# Patient Record
Sex: FEMALE | Race: WHITE | Employment: OTHER | URBAN - METROPOLITAN AREA
[De-identification: names, ages, dates, MRNs, and addresses within clinical notes are randomized per-mention and may not be internally consistent; named-entity substitution may affect disease eponyms.]

---

## 2019-10-14 ENCOUNTER — OFFICE VISIT (OUTPATIENT)
Dept: GERIATRIC MEDICINE | Age: 84
End: 2019-10-14
Payer: MEDICARE

## 2019-10-14 DIAGNOSIS — N32.81 DETRUSOR INSTABILITY: ICD-10-CM

## 2019-10-14 DIAGNOSIS — I48.11 LONGSTANDING PERSISTENT ATRIAL FIBRILLATION (HCC): Primary | ICD-10-CM

## 2019-10-14 DIAGNOSIS — H81.10 BENIGN PAROXYSMAL POSITIONAL VERTIGO, UNSPECIFIED LATERALITY: ICD-10-CM

## 2019-10-14 DIAGNOSIS — E78.5 DYSLIPIDEMIA: ICD-10-CM

## 2019-10-14 DIAGNOSIS — I10 BENIGN ESSENTIAL HTN: ICD-10-CM

## 2019-10-14 DIAGNOSIS — I38 HEART VALVE DISEASE: ICD-10-CM

## 2019-10-14 DIAGNOSIS — M15.9 PRIMARY OSTEOARTHRITIS INVOLVING MULTIPLE JOINTS: ICD-10-CM

## 2019-10-16 ENCOUNTER — OFFICE VISIT (OUTPATIENT)
Dept: GERIATRIC MEDICINE | Age: 84
End: 2019-10-16
Payer: MEDICARE

## 2019-10-16 DIAGNOSIS — I48.20 CHRONIC ATRIAL FIBRILLATION (HCC): Primary | ICD-10-CM

## 2019-10-16 DIAGNOSIS — M75.00 ADHESIVE CAPSULITIS OF SHOULDER, UNSPECIFIED LATERALITY: ICD-10-CM

## 2019-10-16 DIAGNOSIS — D63.8 ANEMIA, CHRONIC DISEASE: ICD-10-CM

## 2019-10-16 DIAGNOSIS — K59.00 CONSTIPATION, UNSPECIFIED CONSTIPATION TYPE: ICD-10-CM

## 2019-10-16 RX ORDER — PRAVASTATIN SODIUM 20 MG
20 TABLET ORAL DAILY
COMMUNITY
End: 2020-08-31

## 2019-10-16 RX ORDER — MECLIZINE HYDROCHLORIDE 25 MG/1
12.5 TABLET ORAL 3 TIMES DAILY
COMMUNITY

## 2019-10-16 RX ORDER — DILTIAZEM HYDROCHLORIDE 240 MG/1
240 CAPSULE, EXTENDED RELEASE ORAL DAILY
COMMUNITY

## 2019-10-16 RX ORDER — ALENDRONATE SODIUM 70 MG/1
70 TABLET ORAL
COMMUNITY
End: 2020-08-31

## 2019-10-16 RX ORDER — MECLIZINE HCL 12.5 MG/1
12.5 TABLET ORAL 3 TIMES DAILY PRN
COMMUNITY
End: 2020-06-10

## 2019-10-16 RX ORDER — ACETAMINOPHEN 500 MG
500 TABLET ORAL 3 TIMES DAILY
COMMUNITY

## 2019-10-16 RX ORDER — MULTIVIT-MIN/IRON/FOLIC ACID/K 18-600-40
2000 CAPSULE ORAL 2 TIMES DAILY
COMMUNITY
End: 2022-03-01

## 2019-10-16 RX ORDER — PHENOL 1.4 %
2 AEROSOL, SPRAY (ML) MUCOUS MEMBRANE DAILY
COMMUNITY
End: 2020-08-31

## 2019-10-16 RX ORDER — DOCUSATE SODIUM 100 MG/1
100 CAPSULE, LIQUID FILLED ORAL 2 TIMES DAILY
COMMUNITY

## 2019-10-16 RX ORDER — POTASSIUM CHLORIDE 750 MG/1
10 TABLET, EXTENDED RELEASE ORAL DAILY
COMMUNITY
End: 2022-03-01 | Stop reason: ALTCHOICE

## 2019-10-16 RX ORDER — OXYCODONE HYDROCHLORIDE AND ACETAMINOPHEN 5; 325 MG/1; MG/1
1 TABLET ORAL 2 TIMES DAILY PRN
COMMUNITY
End: 2020-08-31

## 2019-10-16 RX ORDER — FUROSEMIDE 20 MG/1
20 TABLET ORAL DAILY
COMMUNITY
End: 2020-08-31

## 2019-10-16 RX ORDER — HYDROCHLOROTHIAZIDE 12.5 MG/1
25 TABLET ORAL DAILY
COMMUNITY

## 2019-11-01 LAB
BASOPHILS ABSOLUTE: 0.1 /ΜL
BASOPHILS RELATIVE PERCENT: 1 %
EOSINOPHILS ABSOLUTE: 0.2 /ΜL
EOSINOPHILS RELATIVE PERCENT: 2.9 %
HCT VFR BLD CALC: 39 % (ref 36–46)
HEMOGLOBIN: 13 G/DL (ref 12–16)
INR BLD: 2
LYMPHOCYTES ABSOLUTE: 2 /ΜL
LYMPHOCYTES RELATIVE PERCENT: 25.3 %
MCH RBC QN AUTO: 31.9 PG
MCHC RBC AUTO-ENTMCNC: 33.4 G/DL
MCV RBC AUTO: 95.5 FL
MONOCYTES ABSOLUTE: 0.7 /ΜL
MONOCYTES RELATIVE PERCENT: 9.2 %
NEUTROPHILS ABSOLUTE: 4.8 /ΜL
NEUTROPHILS RELATIVE PERCENT: 61.6 %
PLATELET # BLD: 231 K/ΜL
PMV BLD AUTO: 9.2 FL
PROTIME: 21.1 SECONDS
RBC # BLD: 4.08 10^6/ΜL
WBC # BLD: 7.8 10^3/ML

## 2019-11-05 ENCOUNTER — OFFICE VISIT (OUTPATIENT)
Dept: GERIATRIC MEDICINE | Age: 84
End: 2019-11-05
Payer: MEDICARE

## 2019-11-05 DIAGNOSIS — I48.11 LONGSTANDING PERSISTENT ATRIAL FIBRILLATION (HCC): Primary | ICD-10-CM

## 2019-11-05 DIAGNOSIS — H81.10 BENIGN PAROXYSMAL POSITIONAL VERTIGO, UNSPECIFIED LATERALITY: ICD-10-CM

## 2019-11-05 DIAGNOSIS — I10 BENIGN ESSENTIAL HTN: ICD-10-CM

## 2019-11-05 DIAGNOSIS — M19.90 ARTHRITIS: ICD-10-CM

## 2019-11-12 VITALS
HEART RATE: 70 BPM | RESPIRATION RATE: 18 BRPM | SYSTOLIC BLOOD PRESSURE: 148 MMHG | TEMPERATURE: 98.5 F | DIASTOLIC BLOOD PRESSURE: 96 MMHG | OXYGEN SATURATION: 96 %

## 2019-11-17 PROBLEM — H81.10 BENIGN PAROXYSMAL POSITIONAL VERTIGO: Status: ACTIVE | Noted: 2019-11-17

## 2019-11-17 PROBLEM — I10 BENIGN ESSENTIAL HTN: Status: ACTIVE | Noted: 2019-11-17

## 2019-11-17 PROBLEM — E78.5 DYSLIPIDEMIA: Status: ACTIVE | Noted: 2019-11-17

## 2019-11-17 PROBLEM — N32.81 DETRUSOR INSTABILITY: Status: ACTIVE | Noted: 2019-11-17

## 2019-11-17 PROBLEM — I38 HEART VALVE DISEASE: Status: ACTIVE | Noted: 2019-11-17

## 2019-11-17 PROBLEM — M15.9 PRIMARY OSTEOARTHRITIS INVOLVING MULTIPLE JOINTS: Status: ACTIVE | Noted: 2019-11-17

## 2019-11-17 PROBLEM — I48.11 LONGSTANDING PERSISTENT ATRIAL FIBRILLATION (HCC): Status: ACTIVE | Noted: 2019-11-17

## 2019-12-03 ENCOUNTER — OFFICE VISIT (OUTPATIENT)
Dept: GERIATRIC MEDICINE | Age: 84
End: 2019-12-03
Payer: MEDICARE

## 2019-12-03 DIAGNOSIS — F32.0 CURRENT MILD EPISODE OF MAJOR DEPRESSIVE DISORDER, UNSPECIFIED WHETHER RECURRENT (HCC): Primary | ICD-10-CM

## 2019-12-03 DIAGNOSIS — M25.511 ACUTE PAIN OF RIGHT SHOULDER: ICD-10-CM

## 2019-12-03 DIAGNOSIS — R42 VERTIGO: ICD-10-CM

## 2019-12-03 DIAGNOSIS — R27.0 ATAXIA: ICD-10-CM

## 2019-12-05 LAB
INR BLD: 1.7
PROTIME: 18.4 SECONDS

## 2019-12-09 ENCOUNTER — OFFICE VISIT (OUTPATIENT)
Dept: PALLATIVE CARE | Age: 84
End: 2019-12-09
Payer: MEDICARE

## 2019-12-09 VITALS
DIASTOLIC BLOOD PRESSURE: 64 MMHG | HEART RATE: 65 BPM | SYSTOLIC BLOOD PRESSURE: 110 MMHG | OXYGEN SATURATION: 99 % | RESPIRATION RATE: 16 BRPM

## 2019-12-09 DIAGNOSIS — Z51.5 PALLIATIVE CARE ENCOUNTER: Primary | ICD-10-CM

## 2019-12-09 DIAGNOSIS — M19.90 OSTEOARTHRITIS, UNSPECIFIED OSTEOARTHRITIS TYPE, UNSPECIFIED SITE: ICD-10-CM

## 2019-12-09 DIAGNOSIS — G89.29 CHRONIC RIGHT SHOULDER PAIN: ICD-10-CM

## 2019-12-09 DIAGNOSIS — M25.511 CHRONIC RIGHT SHOULDER PAIN: ICD-10-CM

## 2019-12-09 DIAGNOSIS — Z71.89 OTHER SPECIFIED COUNSELING: ICD-10-CM

## 2019-12-09 DIAGNOSIS — M75.01 ADHESIVE CAPSULITIS OF RIGHT SHOULDER: ICD-10-CM

## 2019-12-09 PROBLEM — A46 ERYSIPELAS: Status: ACTIVE | Noted: 2018-11-19

## 2019-12-09 PROBLEM — I48.20 CHRONIC ATRIAL FIBRILLATION (HCC): Status: ACTIVE | Noted: 2019-12-09

## 2019-12-09 ASSESSMENT — ENCOUNTER SYMPTOMS
STRIDOR: 0
EYE DISCHARGE: 0
SORE THROAT: 0
BLOOD IN STOOL: 0
WHEEZING: 0
VOMITING: 0
COUGH: 0
CHOKING: 0
CONSTIPATION: 0
NAUSEA: 0
APNEA: 0
VOICE CHANGE: 0
DIARRHEA: 0
TROUBLE SWALLOWING: 0
ABDOMINAL DISTENTION: 0
COLOR CHANGE: 0
BACK PAIN: 0
CHEST TIGHTNESS: 0
RECTAL PAIN: 0
SHORTNESS OF BREATH: 0
ANAL BLEEDING: 0
ABDOMINAL PAIN: 0

## 2019-12-12 LAB
INR BLD: 1.6
PROTIME: 16.7 SECONDS

## 2019-12-19 PROBLEM — M19.90 ARTHRITIS: Status: ACTIVE | Noted: 2019-12-19

## 2019-12-19 LAB
INR BLD: 2.1
PROTIME: 22.1 SECONDS

## 2019-12-23 LAB
INR BLD: 2.4
PROTIME: 24.6 SECONDS

## 2019-12-24 ENCOUNTER — OFFICE VISIT (OUTPATIENT)
Dept: GERIATRIC MEDICINE | Age: 84
End: 2019-12-24
Payer: MEDICARE

## 2019-12-26 LAB
INR BLD: 2
PROTIME: 20.3 SECONDS

## 2019-12-30 LAB
INR BLD: 2.6
PROTIME: 26.7 SECONDS

## 2019-12-31 ENCOUNTER — OFFICE VISIT (OUTPATIENT)
Dept: GERIATRIC MEDICINE | Age: 84
End: 2019-12-31
Payer: MEDICARE

## 2020-01-02 LAB
INR BLD: 2.5
PROTIME: 25.3 SECONDS

## 2020-01-04 LAB
BILIRUBIN, URINE: NEGATIVE
BLOOD, URINE: NEGATIVE
CLARITY: CLEAR
COLOR: YELLOW
GLUCOSE URINE: NEGATIVE
KETONES, URINE: NEGATIVE
LEUKOCYTE ESTERASE, URINE: NEGATIVE
NITRITE, URINE: NEGATIVE
PH UA: 7 (ref 4.5–8)
PROTEIN UA: NEGATIVE
SPECIFIC GRAVITY, URINE: 1.01
UROBILINOGEN, URINE: NORMAL

## 2020-01-07 PROBLEM — R41.0 CONFUSION: Status: ACTIVE | Noted: 2020-01-07

## 2020-01-07 PROBLEM — F32.1 CURRENT MODERATE EPISODE OF MAJOR DEPRESSIVE DISORDER WITHOUT PRIOR EPISODE (HCC): Status: ACTIVE | Noted: 2020-01-07

## 2020-01-07 PROBLEM — R35.0 URINE FREQUENCY: Status: ACTIVE | Noted: 2020-01-07

## 2020-01-07 NOTE — PROGRESS NOTES
no significant edema. She has no bruising or induration noted on her arms, elbows, joint, back. Her skin is intact. ASSESSMENT AND PLAN:   1. Coughing, wheezing: She is on Levaquin. 2. Fall: She does have a history of vertigo. No ataxia at my exam.  3. Left eye blepharitis: Baby soap and water or even just warm compress to remove crust from her eyelashes in the morning. POC, meds, labs reviewed. We will continue to follow. Watch her INR closely while she is on antibiotic and we will watch for any neurological changes.     Shaina Abdi, APRN-CNP      Shaina Abdi DNP, MSN, RN, GNP-BC, NP-C  Adult/Kishore Nurse Practitioner  [facility] - [facility]  Department of Geriatrics  8:30am-4:30pm   [phone]  After hours Answering service [phone]

## 2020-01-13 LAB
INR BLD: 1.9
PROTIME: 19.9 SECONDS

## 2020-01-15 ENCOUNTER — OFFICE VISIT (OUTPATIENT)
Dept: PALLATIVE CARE | Age: 85
End: 2020-01-15
Payer: MEDICARE

## 2020-01-15 VITALS
DIASTOLIC BLOOD PRESSURE: 74 MMHG | HEART RATE: 63 BPM | OXYGEN SATURATION: 93 % | RESPIRATION RATE: 16 BRPM | SYSTOLIC BLOOD PRESSURE: 110 MMHG

## 2020-01-15 ASSESSMENT — ENCOUNTER SYMPTOMS
DIARRHEA: 0
NAUSEA: 0
CHEST TIGHTNESS: 0
VOICE CHANGE: 0
BLOOD IN STOOL: 0
CHOKING: 0
TROUBLE SWALLOWING: 0
CONSTIPATION: 0
ABDOMINAL DISTENTION: 0
COLOR CHANGE: 0
STRIDOR: 0
APNEA: 0
ANAL BLEEDING: 0
SORE THROAT: 0
VOMITING: 0
ABDOMINAL PAIN: 0
BACK PAIN: 0
WHEEZING: 0
COUGH: 0
RECTAL PAIN: 0
SHORTNESS OF BREATH: 0
EYE DISCHARGE: 0

## 2020-01-15 NOTE — PROGRESS NOTES
on phone: Not on file     Gets together: Not on file     Attends Confucianism service: Not on file     Active member of club or organization: Not on file     Attends meetings of clubs or organizations: Not on file     Relationship status: Not on file    Intimate partner violence:     Fear of current or ex partner: Not on file     Emotionally abused: Not on file     Physically abused: Not on file     Forced sexual activity: Not on file   Other Topics Concern    Not on file   Social History Narrative    Not on file     No family history on file. Allergies   Allergen Reactions    Pcn [Penicillins]     Vesicare [Solifenacin]      Current Outpatient Medications on File Prior to Visit   Medication Sig Dispense Refill    acetaminophen (TYLENOL) 500 MG tablet Take 500 mg by mouth 2 times daily      alendronate (FOSAMAX) 70 MG tablet Take 70 mg by mouth every 7 days      calcium carbonate 600 MG TABS tablet Take 2 tablets by mouth daily      diltiazem (DILACOR XR) 240 MG extended release capsule Take 240 mg by mouth daily      docusate sodium (COLACE) 100 MG capsule Take 100 mg by mouth 2 times daily      furosemide (LASIX) 20 MG tablet Take 20 mg by mouth daily      hydrochlorothiazide (HYDRODIURIL) 12.5 MG tablet Take 12.5 mg by mouth daily      meclizine (ANTIVERT) 25 MG tablet Take 50 mg by mouth 2 times daily      potassium chloride (KLOR-CON M) 10 MEQ extended release tablet Take 10 mEq by mouth daily      meclizine (ANTIVERT) 12.5 MG tablet Take 12.5 mg by mouth 3 times daily as needed      oxyCODONE-acetaminophen (PERCOCET) 5-325 MG per tablet Take 1 tablet by mouth 2 times daily as needed for Pain.       diclofenac sodium (VOLTAREN) 1 % GEL Apply 2 g topically daily as needed for Pain      pravastatin (PRAVACHOL) 20 MG tablet Take 20 mg by mouth daily      Cholecalciferol (VITAMIN D) 2000 units CAPS capsule Take 2,000 Units by mouth daily       No current facility-administered medications on file prior to visit. Review of Systems   Constitutional: Negative for activity change, appetite change, chills, diaphoresis, fatigue, fever and unexpected weight change. HENT: Negative for drooling, hearing loss, mouth sores, sore throat, trouble swallowing and voice change. Eyes: Negative for discharge and visual disturbance. Respiratory: Negative for apnea, cough, choking, chest tightness, shortness of breath, wheezing and stridor. Cardiovascular: Negative for chest pain, palpitations and leg swelling. Gastrointestinal: Negative for abdominal distention, abdominal pain, anal bleeding, blood in stool, constipation, diarrhea, nausea, rectal pain and vomiting. Genitourinary: Negative for difficulty urinating, dysuria, enuresis, frequency and hematuria. Musculoskeletal: Positive for arthralgias. Negative for back pain, gait problem, joint swelling and myalgias. Skin: Negative for color change, pallor, rash and wound. Allergic/Immunologic: Negative for food allergies and immunocompromised state. Neurological: Positive for dizziness. Negative for tremors, seizures, syncope, facial asymmetry, speech difficulty, weakness, light-headedness, numbness and headaches. Hematological: Negative for adenopathy. Does not bruise/bleed easily. Psychiatric/Behavioral: Negative for agitation, behavioral problems, confusion, decreased concentration, dysphoric mood, hallucinations, self-injury, sleep disturbance and suicidal ideas. The patient is not nervous/anxious and is not hyperactive. Objective:   /74   Pulse 63   Resp 16   SpO2 93%     Physical Exam  Constitutional:       General: She is not in acute distress. Appearance: She is well-developed. She is not diaphoretic. HENT:      Head: Normocephalic and atraumatic. Right Ear: External ear normal.      Left Ear: External ear normal.      Nose: Nose normal.      Mouth/Throat:      Pharynx: No oropharyngeal exudate.    Eyes: behavior  Pt advised to call for increasing or uncontrolled pain  Risk vs benefit assessed. Pt educated on risk of addiction.    Pt advised to take only as prescribed and not to change frequency of pain meds without consulting palliative care first.      Roxanna Tate, APRN - CNP

## 2020-02-05 ENCOUNTER — OFFICE VISIT (OUTPATIENT)
Dept: GERIATRIC MEDICINE | Age: 85
End: 2020-02-05
Payer: MEDICARE

## 2020-02-17 LAB
INR BLD: 1.7
PROTIME: 17.7 SECONDS

## 2020-02-19 ENCOUNTER — OFFICE VISIT (OUTPATIENT)
Dept: GERIATRIC MEDICINE | Age: 85
End: 2020-02-19
Payer: MEDICARE

## 2020-02-20 NOTE — PROGRESS NOTES
auscultation, even, nonlabored respirations. Abdomen is mildly obese, positive bowel sounds. No rigidity or guarding. Lower extremities no significant gross edema. ASSESSMENT AND PLAN:   1. Depression, under good control with low dose SSRI and her forgetfulness seems to improve overall. 2.  Osteoarthritis of the shoulders. Voltaren gel does effectively relieve it. The patient will let the nursing use it. 3.  Chronic dizziness, remains the same. She has had this for greater than 10 years now and she has been on the meclizine b.i.d. for a long time. It is sometimes effective, but other times it is not. Today she seems a little more dizzy than usual.  4.  Atrial fibrillation, on Coumadin and is having falls. Thus far she has not had any blows to the head and no bruise in the brain. We are going to monitor closely and consider pros and cons of Coumadin with falls. POC, meds, labs reviewed. We will continue to follow.           Electronically Signed By: LILIBETH Alvarez GNP-BC on 02/20/2020 08:31:38  ______________________________  LILIBETH Alvarez GNP-BC  /[id number]  D: 02/19/2020 16:16:25  T: 02/19/2020 22:40:47    cc: - [address] [facility]

## 2020-02-24 LAB
INR BLD: 3
PROTIME: 30.6 SECONDS

## 2020-02-25 ENCOUNTER — OFFICE VISIT (OUTPATIENT)
Dept: PALLATIVE CARE | Age: 85
End: 2020-02-25
Payer: MEDICARE

## 2020-02-25 ASSESSMENT — ENCOUNTER SYMPTOMS
DIARRHEA: 0
TROUBLE SWALLOWING: 0
CHOKING: 0
APNEA: 0
ABDOMINAL DISTENTION: 0
SORE THROAT: 0
STRIDOR: 0
CONSTIPATION: 0
ANAL BLEEDING: 0
BLOOD IN STOOL: 0
COLOR CHANGE: 0
COUGH: 0
BACK PAIN: 0
ABDOMINAL PAIN: 0
NAUSEA: 0
CHEST TIGHTNESS: 0
WHEEZING: 0
VOICE CHANGE: 0
VOMITING: 0
EYE DISCHARGE: 0
SHORTNESS OF BREATH: 0
RECTAL PAIN: 0

## 2020-02-25 NOTE — PROGRESS NOTES
phone: Not on file     Gets together: Not on file     Attends Baptist service: Not on file     Active member of club or organization: Not on file     Attends meetings of clubs or organizations: Not on file     Relationship status: Not on file    Intimate partner violence:     Fear of current or ex partner: Not on file     Emotionally abused: Not on file     Physically abused: Not on file     Forced sexual activity: Not on file   Other Topics Concern    Not on file   Social History Narrative    Not on file     No family history on file. Allergies   Allergen Reactions    Pcn [Penicillins]     Vesicare [Solifenacin]      Current Outpatient Medications on File Prior to Visit   Medication Sig Dispense Refill    acetaminophen (TYLENOL) 500 MG tablet Take 500 mg by mouth 2 times daily      alendronate (FOSAMAX) 70 MG tablet Take 70 mg by mouth every 7 days      calcium carbonate 600 MG TABS tablet Take 2 tablets by mouth daily      diltiazem (DILACOR XR) 240 MG extended release capsule Take 240 mg by mouth daily      docusate sodium (COLACE) 100 MG capsule Take 100 mg by mouth 2 times daily      furosemide (LASIX) 20 MG tablet Take 20 mg by mouth daily      hydrochlorothiazide (HYDRODIURIL) 12.5 MG tablet Take 12.5 mg by mouth daily      meclizine (ANTIVERT) 25 MG tablet Take 50 mg by mouth 2 times daily      potassium chloride (KLOR-CON M) 10 MEQ extended release tablet Take 10 mEq by mouth daily      meclizine (ANTIVERT) 12.5 MG tablet Take 12.5 mg by mouth 3 times daily as needed      oxyCODONE-acetaminophen (PERCOCET) 5-325 MG per tablet Take 1 tablet by mouth 2 times daily as needed for Pain.       diclofenac sodium (VOLTAREN) 1 % GEL Apply 2 g topically daily as needed for Pain      pravastatin (PRAVACHOL) 20 MG tablet Take 20 mg by mouth daily      Cholecalciferol (VITAMIN D) 2000 units CAPS capsule Take 2,000 Units by mouth daily       No current facility-administered medications on file prior to visit. Review of Systems   Constitutional: Positive for fatigue. Negative for activity change, appetite change, chills, diaphoresis, fever and unexpected weight change. HENT: Negative for drooling, hearing loss, mouth sores, sore throat, trouble swallowing and voice change. Eyes: Negative for discharge and visual disturbance. Respiratory: Negative for apnea, cough, choking, chest tightness, shortness of breath, wheezing and stridor. Cardiovascular: Negative for chest pain, palpitations and leg swelling. Gastrointestinal: Negative for abdominal distention, abdominal pain, anal bleeding, blood in stool, constipation, diarrhea, nausea, rectal pain and vomiting. Genitourinary: Negative for difficulty urinating, dysuria, enuresis, frequency and hematuria. Musculoskeletal: Negative for back pain, gait problem, joint swelling and myalgias. Skin: Negative for color change, pallor, rash and wound. Allergic/Immunologic: Negative for food allergies and immunocompromised state. Neurological: Positive for headaches. Negative for dizziness, tremors, seizures, syncope, facial asymmetry, speech difficulty, weakness, light-headedness and numbness. Hematological: Negative for adenopathy. Does not bruise/bleed easily. Psychiatric/Behavioral: Positive for confusion. Negative for agitation, behavioral problems, decreased concentration, dysphoric mood, hallucinations, self-injury, sleep disturbance and suicidal ideas. The patient is not nervous/anxious and is not hyperactive. Objective: There were no vitals taken for this visit. Physical Exam  Constitutional:       General: She is not in acute distress. Appearance: She is well-developed. She is not diaphoretic. HENT:      Head: Normocephalic and atraumatic. Right Ear: External ear normal.      Left Ear: External ear normal.      Nose: Nose normal.      Mouth/Throat:      Pharynx: No oropharyngeal exudate.    Eyes:

## 2020-02-27 LAB
BILIRUBIN, URINE: NEGATIVE
BLOOD, URINE: NEGATIVE
CLARITY: ABNORMAL
COLOR: YELLOW
GLUCOSE URINE: NEGATIVE
KETONES, URINE: NEGATIVE
LEUKOCYTE ESTERASE, URINE: POSITIVE
NITRITE, URINE: POSITIVE
PH UA: 6 (ref 4.5–8)
PROTEIN UA: NEGATIVE
SPECIFIC GRAVITY, URINE: 1.01
UROBILINOGEN, URINE: NORMAL

## 2020-03-02 LAB
INR BLD: 3.2
PROTIME: 32.6 SECONDS

## 2020-03-16 LAB
INR BLD: 2.3
PROTIME: 23.5 SECONDS

## 2020-03-30 LAB
INR BLD: 2
PROTIME: 20.9 SECONDS

## 2020-04-16 LAB
BILIRUBIN, URINE: NEGATIVE
BLOOD, URINE: NEGATIVE
CLARITY: ABNORMAL
COLOR: YELLOW
GLUCOSE URINE: NEGATIVE
KETONES, URINE: NEGATIVE
LEUKOCYTE ESTERASE, URINE: POSITIVE
NITRITE, URINE: NEGATIVE
PH UA: 6 (ref 4.5–8)
PROTEIN UA: NEGATIVE
SPECIFIC GRAVITY, URINE: 1.02
UROBILINOGEN, URINE: NORMAL

## 2020-04-21 ENCOUNTER — VIRTUAL VISIT (OUTPATIENT)
Dept: GERIATRIC MEDICINE | Age: 85
End: 2020-04-21
Payer: MEDICARE

## 2020-04-27 ENCOUNTER — VIRTUAL VISIT (OUTPATIENT)
Dept: PALLATIVE CARE | Age: 85
End: 2020-04-27
Payer: MEDICARE

## 2020-04-27 LAB
INR BLD: 2.5
PROTIME: 25.5 SECONDS

## 2020-04-27 PROCEDURE — 99441 PR PHYS/QHP TELEPHONE EVALUATION 5-10 MIN: CPT | Performed by: NURSE PRACTITIONER

## 2020-04-27 NOTE — PROGRESS NOTES
file   Tobacco Use    Smoking status: Not on file   Substance and Sexual Activity    Alcohol use: Not on file    Drug use: Not on file    Sexual activity: Not on file   Lifestyle    Physical activity     Days per week: Not on file     Minutes per session: Not on file    Stress: Not on file   Relationships    Social connections     Talks on phone: Not on file     Gets together: Not on file     Attends Latter-day service: Not on file     Active member of club or organization: Not on file     Attends meetings of clubs or organizations: Not on file     Relationship status: Not on file    Intimate partner violence     Fear of current or ex partner: Not on file     Emotionally abused: Not on file     Physically abused: Not on file     Forced sexual activity: Not on file   Other Topics Concern    Not on file   Social History Narrative    Not on file     No family history on file.   Allergies   Allergen Reactions    Pcn [Penicillins]     Vesicare [Solifenacin]      Current Outpatient Medications on File Prior to Visit   Medication Sig Dispense Refill    acetaminophen (TYLENOL) 500 MG tablet Take 500 mg by mouth 2 times daily      alendronate (FOSAMAX) 70 MG tablet Take 70 mg by mouth every 7 days      calcium carbonate 600 MG TABS tablet Take 2 tablets by mouth daily      diltiazem (DILACOR XR) 240 MG extended release capsule Take 240 mg by mouth daily      docusate sodium (COLACE) 100 MG capsule Take 100 mg by mouth 2 times daily      furosemide (LASIX) 20 MG tablet Take 20 mg by mouth daily      hydrochlorothiazide (HYDRODIURIL) 12.5 MG tablet Take 12.5 mg by mouth daily      meclizine (ANTIVERT) 25 MG tablet Take 50 mg by mouth 2 times daily      potassium chloride (KLOR-CON M) 10 MEQ extended release tablet Take 10 mEq by mouth daily      meclizine (ANTIVERT) 12.5 MG tablet Take 12.5 mg by mouth 3 times daily as needed      oxyCODONE-acetaminophen (PERCOCET) 5-325 MG per tablet Take 1 tablet by

## 2020-05-09 NOTE — PROGRESS NOTES
months (around 7/21/2020). Hilda Malik is a 80 y.o. female being evaluated by a Virtual Visit (video visit) encounter to address concerns as mentioned above. A caregiver was present when appropriate. Due to this being a TeleHealth encounter (During INNNT-54 public health emergency), evaluation of the following organ systems was limited: Vitals/Constitutional/EENT/Resp/CV/GI//MS/Neuro/Skin/Heme-Lymph-Imm. Pursuant to the emergency declaration under the [address] [place] [address], [address] [place] [address] authority and the [employer] Act, this Virtual Visit was conducted with patient's (and/or legal guardian's) consent, to reduce the patient's risk of exposure to COVID-19 and provide necessary medical care. The patient (and/or legal guardian) has also been advised to contact this office for worsening conditions or problems, and seek emergency medical treatment and/or call 911 if deemed necessary. Patient identification was verified at the start of the visit: Yes    Total time spent on this encounter: Not billed by time    Services were provided through a video synchronous discussion virtually to substitute for in-person clinic visit, and they agree to continue with assistance of NF staff and via VIRTUAL platform Patient and provider were located at their individual homes. --JESSENIA Godoy - CNP on 5/9/2020 at 7:09 PM    An electronic signature was used to authenticate this note.

## 2020-05-28 ENCOUNTER — OUTSIDE SERVICES (OUTPATIENT)
Dept: GERIATRIC MEDICINE | Age: 85
End: 2020-05-28
Payer: MEDICARE

## 2020-05-28 LAB
INR BLD: 2.6
PROTIME: 26.6 SECONDS

## 2020-05-28 PROCEDURE — 93793 ANTICOAG MGMT PT WARFARIN: CPT | Performed by: NURSE PRACTITIONER

## 2020-06-10 ENCOUNTER — VIRTUAL VISIT (OUTPATIENT)
Dept: PALLATIVE CARE | Age: 85
End: 2020-06-10
Payer: MEDICARE

## 2020-06-10 LAB
BILIRUBIN, URINE: NEGATIVE
BLOOD, URINE: NEGATIVE
CLARITY: ABNORMAL
COLOR: YELLOW
GLUCOSE URINE: NEGATIVE
KETONES, URINE: NEGATIVE
LEUKOCYTE ESTERASE, URINE: ABNORMAL
NITRITE, URINE: POSITIVE
PH UA: 7 (ref 4.5–8)
PROTEIN UA: NEGATIVE
SPECIFIC GRAVITY, URINE: 1.02
UROBILINOGEN, URINE: NORMAL

## 2020-06-10 RX ORDER — WARFARIN SODIUM 2.5 MG/1
6.5 TABLET ORAL
COMMUNITY
End: 2020-08-30 | Stop reason: ALTCHOICE

## 2020-06-10 ASSESSMENT — ENCOUNTER SYMPTOMS
VOMITING: 0
SHORTNESS OF BREATH: 0
DIARRHEA: 0
COUGH: 0
EYE DISCHARGE: 0
CHOKING: 0
WHEEZING: 0
ABDOMINAL PAIN: 0
BLOOD IN STOOL: 0
SORE THROAT: 0
ABDOMINAL DISTENTION: 0
BACK PAIN: 0
ANAL BLEEDING: 0
CONSTIPATION: 0
APNEA: 0
NAUSEA: 0
STRIDOR: 0
COLOR CHANGE: 0
VOICE CHANGE: 0
TROUBLE SWALLOWING: 0
RECTAL PAIN: 0
CHEST TIGHTNESS: 0

## 2020-06-10 NOTE — PROGRESS NOTES
[employer] of [address] [facility], [address] [facility]    Subjective:      Patient Id:  Aline Jackson is a 80 y.o. female who presents today with   Chief Complaint   Patient presents with    Follow-up          HPI  Seen at [facility] DR. BECCA SANTIAGO for symptom management rt OA, Afib, and depression. Demeanor calm and relaxed, NAD. She feels current regimen is helping with her OA related pain in her shoulders    Long standing complaints of dizziness which has caused falls at times. She has had multiple workups, Antivert does not help, PT did not help. She has had this complaint over 10 years. Weight stable, Appetite is good. No GI or  concerns. Negative excessive fatigue, fever, chills, myalgias, increased sputum production or cough, nausea, vomiting, chest pain, or orthopnea. Negative significant Sleep disturbance, depression, anxiety, or agitation episodes. 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 Social History     Socioeconomic History    Marital status:       Spouse name: Not on file    Number of children: Not on file    Years of education: Not on file    Highest education level: Not on file   Occupational History    Not on file   Social Needs    Financial resource strain: Not on file    Food insecurity     Worry: Not on file     Inability: Not on file    Transportation needs     Medical: Not on file     Non-medical: Not on file   Tobacco Use    Smoking status: Not on file   Substance and Sexual Activity    Alcohol use: Not on file    Drug use: Not on file    Sexual activity: Not on file   Lifestyle    Physical activity     Days per week: Not on file     Minutes per session: Not on file    Stress: Not on file   Relationships    Social connections     Talks on phone: Not on file     Gets together: Not on file     Attends Baptism service: Not on file     Active member of club or organization: Not on file     Attends meetings of clubs or organizations: Not on file     Relationship status: Not on file    Intimate partner violence     Fear of current or ex partner: Not on file     Emotionally abused: Not on file     Physically abused: Not on file     Forced sexual activity: Not on file   Other Topics Concern    Not on file   Social History Narrative    Not on file     No family history on file. Allergies   Allergen Reactions    Pcn [Penicillins]     Vesicare [Solifenacin]      Current Outpatient Medications on File Prior to Visit   Medication Sig Dispense Refill    warfarin (COUMADIN) 2.5 MG tablet Take 6.5 mg by mouth Takes Monday- wed- saturday      acetaminophen (TYLENOL) 500 MG tablet Take 500 mg by mouth 2 times daily      alendronate (FOSAMAX) 70 MG tablet Take 70 mg by mouth every 7 days      calcium carbonate 600 MG TABS tablet Take 2 tablets by mouth daily      diltiazem (DILACOR XR) 240 MG extended release capsule Take 240 mg by mouth daily      docusate sodium (COLACE) 100 MG capsule Take 100 mg by mouth 2 times daily      furosemide (LASIX) 20 MG tablet Take 20 mg by mouth daily      hydrochlorothiazide (HYDRODIURIL) 12.5 MG tablet Take 12.5 mg by mouth daily      meclizine (ANTIVERT) 25 MG tablet Take 50 mg by mouth 2 times daily      potassium chloride (KLOR-CON M) 10 MEQ extended release tablet Take 10 mEq by mouth daily      oxyCODONE-acetaminophen (PERCOCET) 5-325 MG per tablet Take 1 tablet by mouth 2 times daily as needed for Pain.       diclofenac sodium (VOLTAREN) 1 % GEL Apply 2 g topically daily as needed for Pain      pravastatin (PRAVACHOL) 20 MG tablet Take 20 mg by mouth daily      exercises  Pt is tolerating current pain meds without adverse effects or over sedation. Lowest effective dose used. Pt advised to call and notify palliative care for any adverse effects or sedation  Pt is able to maintain adequate functional level and participate in ADLs  OARRS reviewed. There is no indication of aberrant behavior  Nursing staff  advised to call for increasing or uncontrolled pain  Risk vs benefit assessed. Pt educated on risk of addiction.      Dizziness  -  Instead of Meclizine, Consider Valium 2 mg po every 12 hours prn vertigo, hold for oversedation   as it may improve both pain and vertigo    Shelli Andre, APRN - CNP

## 2020-06-19 LAB
INR BLD: 4.9
PROTIME: 48.5 SECONDS

## 2020-06-22 LAB
INR BLD: 1.4
PROTIME: 14.9 SECONDS

## 2020-06-24 ENCOUNTER — OFFICE VISIT (OUTPATIENT)
Dept: GERIATRIC MEDICINE | Age: 85
End: 2020-06-24
Payer: MEDICARE

## 2020-06-27 NOTE — PROGRESS NOTES
[facility]  [address] [place] [address]      6/24/2020    Twyla Malik  is a 80 y.o. in the NF being seen for a f/u of   Chief Complaint   Patient presents with    Altered Mental Status     hallucinations       Spoke to nursing and ancillary staff regarding patient status, present state of health. Need for nursing care and assistance. Reviewed chart, labs, medications in NF chart and allergies. Aging in place at this time. HPI lives in  assisted living. Being seen for hallucinations 2 weeks ago treated for UTI, INR stable on coumadin   Reports from nurse, pt is at baseline, no change in vision, hearing. No headaches or choking issues. Uses walker  for locomotion in facility. No decrease in appetite, no change in B/B habits. No pain crises. NO fevers. Can make needs known to nurse. 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 No family history on file. Social History     Socioeconomic History    Marital status:       Spouse name: Not on file    Number of children: Not on file    Years of education: Not on file    Highest education level: Not on file   Occupational History    Not on file   Social Needs    Financial resource strain: Not on file    Food insecurity     Worry: Not on file     Inability: Not on file    Transportation needs     Medical: Not on file     Non-medical: Not on file   Tobacco Use    Smoking status: Not on file   Substance and Sexual Activity    Alcohol use: Not on file    Drug use: Not on file    Sexual activity: Not on file   Lifestyle    Physical 9/24/2020). Pertinent POC, labs, have been reviewed, continue same. Encourage fluids and good nutrition. Stress fall prevention strategies.     Kristine Chun, APRN-CNP      Kristine Chun, DNP, MSN, RN, GNP-BC, NP-C  Adult/Kishore Nurse Practitioner  [address], [facility]  Department of Geriatrics  8:30am-4:30pm   [phone]  After hours Answering service [phone]

## 2020-06-30 VITALS
WEIGHT: 164 LBS | HEART RATE: 60 BPM | TEMPERATURE: 99.5 F | SYSTOLIC BLOOD PRESSURE: 124 MMHG | DIASTOLIC BLOOD PRESSURE: 68 MMHG | RESPIRATION RATE: 18 BRPM

## 2020-07-02 LAB
INR BLD: 1.6
PROTIME: 16.4 SECONDS

## 2020-07-16 LAB
INR BLD: 1.7
PROTIME: 17.7 SECONDS

## 2020-07-28 ENCOUNTER — OFFICE VISIT (OUTPATIENT)
Dept: PALLATIVE CARE | Age: 85
End: 2020-07-28
Payer: MEDICARE

## 2020-07-28 VITALS
DIASTOLIC BLOOD PRESSURE: 74 MMHG | RESPIRATION RATE: 16 BRPM | SYSTOLIC BLOOD PRESSURE: 110 MMHG | OXYGEN SATURATION: 98 % | HEART RATE: 60 BPM

## 2020-07-28 LAB
INR BLD: 2.1
PROTIME: 21.6 SECONDS

## 2020-07-28 ASSESSMENT — ENCOUNTER SYMPTOMS
DIARRHEA: 0
RECTAL PAIN: 0
SORE THROAT: 0
VOICE CHANGE: 0
ABDOMINAL PAIN: 0
TROUBLE SWALLOWING: 0
BACK PAIN: 1
CONSTIPATION: 0
CHEST TIGHTNESS: 0
COUGH: 0
VOMITING: 0
ABDOMINAL DISTENTION: 0
COLOR CHANGE: 0
SHORTNESS OF BREATH: 0
EYE DISCHARGE: 0
CHOKING: 0
APNEA: 0
WHEEZING: 0
NAUSEA: 0
BLOOD IN STOOL: 0
STRIDOR: 0
ANAL BLEEDING: 0

## 2020-07-28 NOTE — PROGRESS NOTES
[employer] of [address] [facility], [address] [facility]    Subjective:      Patient Id:  Diana Orta is a 80 y.o. female who presents today with   Chief Complaint   Patient presents with    Pain          HPI   Seen at [facility] DR. BECCA SANTIAGO for symptom management follow up rt multi joint pain. PMXs for Afib, valve disease, HTN, HLD, OA. She is calm and comfortable, her multi joint pain is well controlled with current POC. However she is hallucinating seeing people in her room who are not there, nursing staff has noted this over the last two weeks, thy thought it might be a vision problem as she can be redirected and she will realize nothing is there. However when I was in the room she told me see saw children on the floor watching TV and outside her window climbing a tree. Strong smell of urine in her room and she admits to having more incontinence lately. Again I told her there were no children present and she was agreeable to this. No anxiety, agitation, or sleep disturbance per nursing staff. She does get frequent UTI's. Negative cough, SOB, edema at baseline, headache, sputum production,fever,chills, or myalgias. No Gi distress, appetite and weight are stable. Past Medical History:   Diagnosis Date    Benign essential HTN 11/17/2019    Constipation 7/26/2016    Detrusor instability 11/17/2019    Dyslipidemia 11/17/2019    Heart valve disease 11/17/2019    Longstanding persistent atrial fibrillation 11/17/2019    Primary osteoarthritis involving multiple joints 11/17/2019     No past surgical history on file. Social History     Socioeconomic History    Marital status:       Spouse name: Not on file    Number of children: Not on file    Years of education: Not on file    Highest education level: Not on file   Occupational History    Not on file   Social Needs    Financial resource strain: Not on file    Food insecurity     Worry: Not on file     Inability: Not on file   [employer] Transportation needs     Medical: Not on file     Non-medical: Not on file   Tobacco Use    Smoking status: Not on file   Substance and Sexual Activity    Alcohol use: Not on file    Drug use: Not on file    Sexual activity: Not on file   Lifestyle    Physical activity     Days per week: Not on file     Minutes per session: Not on file    Stress: Not on file   Relationships    Social connections     Talks on phone: Not on file     Gets together: Not on file     Attends Yarsani service: Not on file     Active member of club or organization: Not on file     Attends meetings of clubs or organizations: Not on file     Relationship status: Not on file    Intimate partner violence     Fear of current or ex partner: Not on file     Emotionally abused: Not on file     Physically abused: Not on file     Forced sexual activity: Not on file   Other Topics Concern    Not on file   Social History Narrative    Not on file     No family history on file.   Allergies   Allergen Reactions    Pcn [Penicillins]     Vesicare [Solifenacin]      Current Outpatient Medications on File Prior to Visit   Medication Sig Dispense Refill    warfarin (COUMADIN) 2.5 MG tablet Take 6.5 mg by mouth Takes Monday- wed- saturday      acetaminophen (TYLENOL) 500 MG tablet Take 500 mg by mouth 2 times daily      alendronate (FOSAMAX) 70 MG tablet Take 70 mg by mouth every 7 days      calcium carbonate 600 MG TABS tablet Take 2 tablets by mouth daily      diltiazem (DILACOR XR) 240 MG extended release capsule Take 240 mg by mouth daily      docusate sodium (COLACE) 100 MG capsule Take 100 mg by mouth 2 times daily      furosemide (LASIX) 20 MG tablet Take 20 mg by mouth daily      hydrochlorothiazide (HYDRODIURIL) 12.5 MG tablet Take 12.5 mg by mouth daily      meclizine (ANTIVERT) 25 MG tablet Take 50 mg by mouth 2 times daily      potassium chloride (KLOR-CON M) 10 MEQ extended release tablet Take 10 mEq by mouth daily      nervous/anxious and is not hyperactive. Objective:   /74   Pulse 60   Resp 16   SpO2 98%     Physical Exam  Constitutional:       General: She is not in acute distress. Appearance: She is well-developed. She is not diaphoretic. HENT:      Head: Normocephalic and atraumatic. Right Ear: External ear normal.      Left Ear: External ear normal.      Nose: Nose normal.      Mouth/Throat:      Pharynx: No oropharyngeal exudate. Eyes:      General: No scleral icterus. Right eye: No discharge. Left eye: No discharge. Conjunctiva/sclera: Conjunctivae normal.      Pupils: Pupils are equal, round, and reactive to light. Neck:      Musculoskeletal: Normal range of motion and neck supple. Thyroid: No thyromegaly. Vascular: No JVD. Trachea: No tracheal deviation. Cardiovascular:      Rate and Rhythm: Normal rate and regular rhythm. Heart sounds: Normal heart sounds. Pulmonary:      Effort: Pulmonary effort is normal. No respiratory distress. Breath sounds: Normal breath sounds. No stridor. No wheezing or rales. Chest:      Chest wall: No tenderness. Abdominal:      General: Bowel sounds are normal. There is no distension. Palpations: Abdomen is soft. There is no mass. Tenderness: There is no abdominal tenderness. There is no guarding or rebound. Musculoskeletal: Normal range of motion. General: No tenderness or deformity. Lymphadenopathy:      Cervical: No cervical adenopathy. Skin:     General: Skin is warm and dry. Findings: No erythema or rash. Neurological:      Mental Status: She is alert. She is confused. Psychiatric:         Attention and Perception: Attention normal.         Mood and Affect: Mood normal.         Speech: Speech normal.         Behavior: Behavior is cooperative. Thought Content: Thought content normal.         Cognition and Memory: Cognition is impaired.            Assessment: Diagnosis Orders   1. Edema, unspecified type     2. Palliative care encounter     3. Arthralgia, unspecified joint     4. Hallucinations, visual  Urinalysis with Reflex Culture   5. Frequency of urination  Urinalysis with Reflex Culture          Plan:   Pain RT OA  - Continue current POC of Voltaren gel, acetaminophen and Percocet 5/325 mg po every 12  Hours prn moderate to severe pain  - Heat or ice to painful areas, whichever is preferred  - Gentle ROM exercises  Pt is tolerating current pain meds without adverse effects or over sedation. Lowest effective dose used. Pt advised to call and notify palliative care for any adverse effects or sedation  Pt is able to maintain adequate functional level and participate in ADLs  OARRS reviewed. There is no indication of aberrant behavior  Pt advised to call for increasing or uncontrolled pain  Risk vs benefit assessed. Pt educated on risk of addiction.    Pt advised to take only as prescribed and not to change frequency of pain meds without consulting palliative care first.      Visual Hallucinations, Urinary urgency ad frequency  - UA and C&S  - Call for worsening symptoms  - IF UA wnl consider further workup    JESSENIA Ruth - CNP

## 2020-07-29 ENCOUNTER — OFFICE VISIT (OUTPATIENT)
Dept: GERIATRIC MEDICINE | Age: 85
End: 2020-07-29
Payer: MEDICARE

## 2020-07-31 LAB
BILIRUBIN, URINE: NEGATIVE
BLOOD, URINE: NEGATIVE
CLARITY: ABNORMAL
COLOR: YELLOW
GLUCOSE URINE: ABNORMAL
KETONES, URINE: NEGATIVE
LEUKOCYTE ESTERASE, URINE: ABNORMAL
NITRITE, URINE: NEGATIVE
PH UA: 5 (ref 4.5–8)
PROTEIN UA: NEGATIVE
SPECIFIC GRAVITY, URINE: 1.02
UROBILINOGEN, URINE: NORMAL

## 2020-08-08 VITALS
RESPIRATION RATE: 16 BRPM | OXYGEN SATURATION: 97 % | SYSTOLIC BLOOD PRESSURE: 135 MMHG | HEART RATE: 67 BPM | TEMPERATURE: 97.7 F | DIASTOLIC BLOOD PRESSURE: 71 MMHG

## 2020-08-08 NOTE — PROGRESS NOTES
[facility]  [address] [place] [address]      7/29/2020    Carley Malik  is a 80 y.o. in the NF being seen for a f/u of   Chief Complaint   Patient presents with    Dizziness     long standing    Urinary Tract Infection     possible       Spoke to nursing and ancillary staff regarding patient status, present state of health. Need for nursing care and assistance. Reviewed chart, labs, medications in NF chart and allergies. Aging in place at this time. HPI lives in  assisted living. Being seen for chronic illness of dizziness, nursing thinks she is more confused may have another UTI. Reports from nurse, pt is not at baseline dementia, no change in vision, hearing. No headaches or choking issues. Uses walker  for locomotion in facility. No decrease in appetite, no change in B/B habits. No pain crises. NO fevers. Can make needs known to nurse. Urge incontinence    Past Medical History:   Diagnosis Date    Benign essential HTN 11/17/2019    Constipation 7/26/2016    Detrusor instability 11/17/2019    Dyslipidemia 11/17/2019    Heart valve disease 11/17/2019    Longstanding persistent atrial fibrillation 11/17/2019    Primary osteoarthritis involving multiple joints 11/17/2019     No past surgical history on file. No family history on file. Social History     Socioeconomic History    Marital status:       Spouse name: Not on file    Number of children: Not on file    Years of education: Not on file    Highest education level: Not on file   Occupational History    Not on file   Social Needs    Financial resource strain: Not on file    Food insecurity     Worry: Not on file     Inability: Not on file    Transportation needs     Medical: Not on file     Non-medical: Not on file   Tobacco Use    Smoking status: Not on file   Substance and Sexual Activity    Alcohol use: Not on file    Drug use: Not on file    Sexual activity: Not on file Lifestyle    Physical activity     Days per week: Not on file     Minutes per session: Not on file    Stress: Not on file   Relationships    Social connections     Talks on phone: Not on file     Gets together: Not on file     Attends Scientologist service: Not on file     Active member of club or organization: Not on file     Attends meetings of clubs or organizations: Not on file     Relationship status: Not on file    Intimate partner violence     Fear of current or ex partner: Not on file     Emotionally abused: Not on file     Physically abused: Not on file     Forced sexual activity: Not on file   Other Topics Concern    Not on file   Social History Narrative    Not on file       Allergies: Pcn [penicillins] and Vesicare [solifenacin]  NF MEDICATIONS REVIEWED    ROS:  See HPI  Constitutional: There are no reports of behavioral issues, change in appetite, fever, or weakness. No gross bleeding issues. Or difficulty ambulating to meals or activities. Respiratory: denies SOB, dyspnea,   Cardiovascular: denies CP, lightheadedness,   GI: No reports of change of bowel habits, no N/V/D/C. : no reports of change in bladder habits, or wosre incontinence or urgency  Extremities: No reports of uncontrolled pain issues, no  chronic edema    Physical exam:   /71   Pulse 67   Temp 97.7 °F (36.5 °C)   Resp 16   SpO2 97%   Constitutional: Awake and alert sitting up in chair,   HEENT: normocephalic, PEAR, MMM, no cyanosis. NO neck mass visualized , facial asymmetry at baseline . Cardiovascular: Regular rate  Respiratory: LCTA, unlabored respirations  GI: abdomen ND  : no bladder tenderness  Extremities: no edema,  Psych: pleasant and able to follow simple commands, normal thought content, speech clear, no confusion noted, ++forgetful    ASSESSMENT:     Diagnosis Orders   1. Dizziness     2. Chronic UTI         PLAN:   1. antivert routinely, she won't ask for it  2.  No symptoms of UTI, U/A is evaluated frequently. Return if symptoms worsen or fail to improve. Pertinent POC, labs, have been reviewed, continue same. Encourage fluids and good nutrition. Stress fall prevention strategies.     JESSENIA Yang-CNP      Joanne Pillai DNP, MSN, RN, GNP-BC, NP-C  Adult/Kishore Nurse Practitioner  [address], [place]  Department of Geriatrics  8:30am-4:30pm   [phone]  After hours Answering service [phone]

## 2020-08-10 ASSESSMENT — PATIENT HEALTH QUESTIONNAIRE - PHQ9
SUM OF ALL RESPONSES TO PHQ9 QUESTIONS 1 & 2: 0
SUM OF ALL RESPONSES TO PHQ QUESTIONS 1-9: 0
SUM OF ALL RESPONSES TO PHQ QUESTIONS 1-9: 0
1. LITTLE INTEREST OR PLEASURE IN DOING THINGS: 0
2. FEELING DOWN, DEPRESSED OR HOPELESS: 0

## 2020-08-18 ENCOUNTER — APPOINTMENT (OUTPATIENT)
Dept: CT IMAGING | Age: 85
End: 2020-08-18
Payer: MEDICARE

## 2020-08-18 ENCOUNTER — HOSPITAL ENCOUNTER (EMERGENCY)
Age: 85
Discharge: HOME | End: 2020-08-18
Attending: EMERGENCY MEDICINE
Payer: MEDICARE

## 2020-08-18 ENCOUNTER — APPOINTMENT (OUTPATIENT)
Dept: GENERAL RADIOLOGY | Age: 85
End: 2020-08-18
Payer: MEDICARE

## 2020-08-18 VITALS
HEIGHT: 65 IN | TEMPERATURE: 98.4 F | BODY MASS INDEX: 27.49 KG/M2 | RESPIRATION RATE: 18 BRPM | OXYGEN SATURATION: 100 % | DIASTOLIC BLOOD PRESSURE: 83 MMHG | WEIGHT: 165 LBS | SYSTOLIC BLOOD PRESSURE: 165 MMHG | HEART RATE: 69 BPM

## 2020-08-18 LAB
ALBUMIN SERPL-MCNC: 3.7 G/DL (ref 3.5–4.6)
ALP BLD-CCNC: 39 U/L (ref 40–130)
ALT SERPL-CCNC: 56 U/L (ref 0–33)
ANION GAP SERPL CALCULATED.3IONS-SCNC: 8 MEQ/L (ref 9–15)
AST SERPL-CCNC: 69 U/L (ref 0–35)
BACTERIA: ABNORMAL /HPF
BASOPHILS ABSOLUTE: 0.1 K/UL (ref 0–0.2)
BASOPHILS RELATIVE PERCENT: 0.8 %
BILIRUB SERPL-MCNC: 0.8 MG/DL (ref 0.2–0.7)
BILIRUBIN URINE: NEGATIVE
BLOOD, URINE: NEGATIVE
BUN BLDV-MCNC: 22 MG/DL (ref 8–23)
CALCIUM SERPL-MCNC: 9 MG/DL (ref 8.5–9.9)
CHLORIDE BLD-SCNC: 104 MEQ/L (ref 95–107)
CLARITY: ABNORMAL
CO2: 29 MEQ/L (ref 20–31)
COLOR: YELLOW
CREAT SERPL-MCNC: 0.49 MG/DL (ref 0.5–0.9)
EKG ATRIAL RATE: 68 BPM
EKG Q-T INTERVAL: 414 MS
EKG QRS DURATION: 94 MS
EKG QTC CALCULATION (BAZETT): 447 MS
EKG R AXIS: 21 DEGREES
EKG T AXIS: 59 DEGREES
EKG VENTRICULAR RATE: 70 BPM
EOSINOPHILS ABSOLUTE: 0.2 K/UL (ref 0–0.7)
EOSINOPHILS RELATIVE PERCENT: 2.4 %
EPITHELIAL CELLS, UA: ABNORMAL /HPF (ref 0–5)
GFR AFRICAN AMERICAN: >60
GFR NON-AFRICAN AMERICAN: >60
GLOBULIN: 3.2 G/DL (ref 2.3–3.5)
GLUCOSE BLD-MCNC: 99 MG/DL (ref 70–99)
GLUCOSE URINE: NEGATIVE MG/DL
HCT VFR BLD CALC: 40.4 % (ref 37–47)
HEMOGLOBIN: 13.6 G/DL (ref 12–16)
HYALINE CASTS: ABNORMAL /HPF (ref 0–5)
KETONES, URINE: NEGATIVE MG/DL
LEUKOCYTE ESTERASE, URINE: ABNORMAL
LYMPHOCYTES ABSOLUTE: 1.4 K/UL (ref 1–4.8)
LYMPHOCYTES RELATIVE PERCENT: 20.1 %
MCH RBC QN AUTO: 31.8 PG (ref 27–31.3)
MCHC RBC AUTO-ENTMCNC: 33.7 % (ref 33–37)
MCV RBC AUTO: 94.5 FL (ref 82–100)
MONOCYTES ABSOLUTE: 0.7 K/UL (ref 0.2–0.8)
MONOCYTES RELATIVE PERCENT: 10.6 %
NEUTROPHILS ABSOLUTE: 4.6 K/UL (ref 1.4–6.5)
NEUTROPHILS RELATIVE PERCENT: 66.1 %
NITRITE, URINE: NEGATIVE
PDW BLD-RTO: 15 % (ref 11.5–14.5)
PH UA: 6 (ref 5–9)
PLATELET # BLD: 208 K/UL (ref 130–400)
POTASSIUM SERPL-SCNC: 4.9 MEQ/L (ref 3.4–4.9)
PROTEIN UA: NEGATIVE MG/DL
RBC # BLD: 4.28 M/UL (ref 4.2–5.4)
RBC UA: ABNORMAL /HPF (ref 0–5)
SODIUM BLD-SCNC: 141 MEQ/L (ref 135–144)
SPECIFIC GRAVITY UA: 1.02 (ref 1–1.03)
TOTAL PROTEIN: 6.9 G/DL (ref 6.3–8)
TROPONIN: <0.01 NG/ML (ref 0–0.01)
URINE REFLEX TO CULTURE: ABNORMAL
UROBILINOGEN, URINE: 0.2 E.U./DL
WBC # BLD: 7 K/UL (ref 4.8–10.8)
WBC UA: ABNORMAL /HPF (ref 0–5)

## 2020-08-18 PROCEDURE — 6370000000 HC RX 637 (ALT 250 FOR IP): Performed by: EMERGENCY MEDICINE

## 2020-08-18 PROCEDURE — 85025 COMPLETE CBC W/AUTO DIFF WBC: CPT

## 2020-08-18 PROCEDURE — 2580000003 HC RX 258: Performed by: EMERGENCY MEDICINE

## 2020-08-18 PROCEDURE — 84484 ASSAY OF TROPONIN QUANT: CPT

## 2020-08-18 PROCEDURE — 70450 CT HEAD/BRAIN W/O DYE: CPT

## 2020-08-18 PROCEDURE — 93005 ELECTROCARDIOGRAM TRACING: CPT | Performed by: EMERGENCY MEDICINE

## 2020-08-18 PROCEDURE — 80053 COMPREHEN METABOLIC PANEL: CPT

## 2020-08-18 PROCEDURE — 99285 EMERGENCY DEPT VISIT HI MDM: CPT

## 2020-08-18 PROCEDURE — 71045 X-RAY EXAM CHEST 1 VIEW: CPT

## 2020-08-18 PROCEDURE — 36415 COLL VENOUS BLD VENIPUNCTURE: CPT

## 2020-08-18 PROCEDURE — 81001 URINALYSIS AUTO W/SCOPE: CPT

## 2020-08-18 RX ORDER — NITROFURANTOIN 25; 75 MG/1; MG/1
100 CAPSULE ORAL ONCE
Status: COMPLETED | OUTPATIENT
Start: 2020-08-18 | End: 2020-08-18

## 2020-08-18 RX ORDER — SODIUM CHLORIDE 0.9 % (FLUSH) 0.9 %
3 SYRINGE (ML) INJECTION EVERY 8 HOURS
Status: DISCONTINUED | OUTPATIENT
Start: 2020-08-18 | End: 2020-08-18

## 2020-08-18 RX ORDER — NITROFURANTOIN 25; 75 MG/1; MG/1
100 CAPSULE ORAL 2 TIMES DAILY
Qty: 10 CAPSULE | Refills: 0 | Status: SHIPPED | OUTPATIENT
Start: 2020-08-18 | End: 2020-08-23

## 2020-08-18 RX ORDER — 0.9 % SODIUM CHLORIDE 0.9 %
250 INTRAVENOUS SOLUTION INTRAVENOUS ONCE
Status: COMPLETED | OUTPATIENT
Start: 2020-08-18 | End: 2020-08-18

## 2020-08-18 RX ADMIN — NITROFURANTOIN (MONOHYDRATE/MACROCRYSTALS) 100 MG: 75; 25 CAPSULE ORAL at 12:39

## 2020-08-18 RX ADMIN — SODIUM CHLORIDE 250 ML: 9 INJECTION, SOLUTION INTRAVENOUS at 11:25

## 2020-08-18 NOTE — ED TRIAGE NOTES
Patient arrived from [facility] assisted living with complaint of confusion this morning and not knowing who she is. Patient is A&OX2 upon arrival. msp intact. No distress noted. Dr. Jayne Kapoor at bedside. Patient asleep, but awakes to verbal stimuli. Patient has no complaints.

## 2020-08-18 NOTE — ED NOTES
Bed: 16  Expected date:   Expected time:   Means of arrival:   Comments:  94 confusion      Analilia Espinal RN  08/18/20 [id number]

## 2020-08-18 NOTE — ED NOTES
Pt down to CT with Rollbare Cecil, Tech. Pt is in no distress.      Michoacano Diaz, MARCOS  08/18/20 0262 Sixto Mckeon RN  08/18/20 [id number]

## 2020-08-18 NOTE — ED PROVIDER NOTES
[address] [facility] ED  eMERGENCY dEPARTMENT eNCOUnter      Pt Name: Zay May  MRN: [id number]  [place] 4/2/1926  Date of evaluation: 8/18/2020  Provider: Thelma eBal MD    CHIEF COMPLAINT       Chief Complaint   Patient presents with    Altered Mental Status     normal mental status is A&OX1-2. per ems patient is A&OX1. HISTORY OF PRESENT ILLNESS   (Location/Symptom, Timing/Onset,Context/Setting, Quality, Duration, Modifying Factors, Severity)  Note limiting factors. Robert Malik is a 80 y.o. female who presents to the emergency department is DNR CC, lives at a nursing home and voices no complaints. She had an episode at the nursing home this morning where she seemed confused more than usual although she has baseline confusion. She thought she was in the hospital.  She seemed weak to the caregivers. She was sent in for evaluation. She denies being on blood thinners but her med list shows otherwise. She is not diabetic. There is no fall or trauma. There is no recent cough or cold symptoms, fever was not documented at the nursing home. I spoke directly to the nursing home staff    HPI    NursingNotes were reviewed. REVIEW OF SYSTEMS    (2-9 systems for level 4, 10 or more for level 5)     Review of Systems     Constitutional symptoms:  no Fatigue, no fever, no chills. Skin symptoms:  Negative except as documented in HPI. ENMT symptoms:  Negative except as documented in HPI. Respiratory symptoms:  Negative except as documented in HPI. Cardiovascular symptoms:  Negative except as documented in HPI. Gastrointestinal symptoms: Negative except for documented as above in the HPI   Genitourinary symptoms:  Negative except as documented in HPI. Musculoskeletal symptoms:  Negative except as documented in HPI. Neurologic symptoms:  Negative except as documented in HPI.   As above baseline confusion has been present for years, seemingly greater confusion this morning  Remainder of 10 systems, all negative except for mentioned above    Except as noted above the remainder of the review of systems was reviewed and negative. PAST MEDICAL HISTORY     Past Medical History:   Diagnosis Date    Benign essential HTN 11/17/2019    Constipation 7/26/2016    Detrusor instability 11/17/2019    Dyslipidemia 11/17/2019    Heart valve disease 11/17/2019    Longstanding persistent atrial fibrillation 11/17/2019    Primary osteoarthritis involving multiple joints 11/17/2019         SURGICALHISTORY     History reviewed. No pertinent surgical history. CURRENT MEDICATIONS       Previous Medications    ACETAMINOPHEN (TYLENOL) 500 MG TABLET    Take 500 mg by mouth 2 times daily    ALENDRONATE (FOSAMAX) 70 MG TABLET    Take 70 mg by mouth every 7 days    CALCIUM CARBONATE 600 MG TABS TABLET    Take 2 tablets by mouth daily    CHOLECALCIFEROL (VITAMIN D) 2000 UNITS CAPS CAPSULE    Take 2,000 Units by mouth daily    DICLOFENAC SODIUM (VOLTAREN) 1 % GEL    Apply 2 g topically daily as needed for Pain    DILTIAZEM (DILACOR XR) 240 MG EXTENDED RELEASE CAPSULE    Take 240 mg by mouth daily    DOCUSATE SODIUM (COLACE) 100 MG CAPSULE    Take 100 mg by mouth 2 times daily    FUROSEMIDE (LASIX) 20 MG TABLET    Take 20 mg by mouth daily    HYDROCHLOROTHIAZIDE (HYDRODIURIL) 12.5 MG TABLET    Take 12.5 mg by mouth daily    MECLIZINE (ANTIVERT) 25 MG TABLET    Take 50 mg by mouth 2 times daily    OXYCODONE-ACETAMINOPHEN (PERCOCET) 5-325 MG PER TABLET    Take 1 tablet by mouth 2 times daily as needed for Pain. POTASSIUM CHLORIDE (KLOR-CON M) 10 MEQ EXTENDED RELEASE TABLET    Take 10 mEq by mouth daily    PRAVASTATIN (PRAVACHOL) 20 MG TABLET    Take 20 mg by mouth daily    WARFARIN (COUMADIN) 2.5 MG TABLET    Take 6.5 mg by mouth Takes Monday- wed- saturday       ALLERGIES     Pcn [penicillins] and Vesicare [solifenacin]    FAMILY HISTORY     History reviewed.  No pertinent family history. SOCIAL HISTORY       Social History     Socioeconomic History    Marital status:      Spouse name: None    Number of children: None    Years of education: None    Highest education level: None   Occupational History    None   Social Needs    Financial resource strain: None    Food insecurity     Worry: None     Inability: None    Transportation needs     Medical: None     Non-medical: None   Tobacco Use    Smoking status: None   Substance and Sexual Activity    Alcohol use: None    Drug use: None    Sexual activity: None   Lifestyle    Physical activity     Days per week: None     Minutes per session: None    Stress: None   Relationships    Social connections     Talks on phone: None     Gets together: None     Attends Pentecostal service: None     Active member of club or organization: None     Attends meetings of clubs or organizations: None     Relationship status: None    Intimate partner violence     Fear of current or ex partner: None     Emotionally abused: None     Physically abused: None     Forced sexual activity: None   Other Topics Concern    None   Social History Narrative    None       SCREENINGS   NIH Stroke Scale  Interval: Baseline  Level of Consciousness (1a. ): Not alert, but arousable by minor stimulation to obey  LOC Questions (1b. ):  Answers one correctly  LOC Commands (1c. ): Performs both tasks correctly  Best Gaze (2. ): Normal  Visual (3. ): No visual loss  Facial Palsy (4. ): Normal symmetrical movement  Motor Arm, Left (5a. ): No drift  Motor Arm, Right (5b. ): No drift  Motor Leg, Left (6a. ): No drift  Motor Leg, Right (6b. ): No drift  Limb Ataxia (7. ): Absent  Sensory (8. ): Normal  Best Language (9. ): No aphasia  Dysarthria (10. ): NormalGlasgow Coma Scale  Eye Opening: Spontaneous  Best Verbal Response: Oriented  Best Motor Response: Obeys commands  Mindi Coma Scale Score: 15 @FLOW([id number])@      PHYSICAL EXAM    (up to 7 for level 4, 8 or and preliminarily interpreted by the emergency physician with the below findings:  CT the brain shows atrophy and no acute changes chest x-ray is clear there is a very mild urinary tract infection which we will treat. She has been observed here in the emergency department for an hour and there is no change in mental status which appears to be baseline. I did speak with her nurse at the nursing home. Primary doctor follow-up in a couple of days. Interpretation per the Radiologist below, if available at the time ofthis note:    CT Head WO Contrast   Final Result      No acute intracranial process. XR CHEST PORTABLE    (Results Pending)         ED BEDSIDE ULTRASOUND:   Performed by ED Physician - none    LABS:  Labs Reviewed   COMPREHENSIVE METABOLIC PANEL - Abnormal; Notable for the following components:       Result Value    Anion Gap 8 (*)     CREATININE 0.49 (*)     Total Bilirubin 0.8 (*)     Alkaline Phosphatase 39 (*)     ALT 56 (*)     AST 69 (*)     All other components within normal limits   CBC WITH AUTO DIFFERENTIAL - Abnormal; Notable for the following components:    MCH 31.8 (*)     RDW 15.0 (*)     All other components within normal limits   URINE RT REFLEX TO CULTURE - Abnormal; Notable for the following components:    Clarity, UA CLOUDY (*)     Leukocyte Esterase, Urine TRACE (*)     All other components within normal limits   MICROSCOPIC URINALYSIS - Abnormal; Notable for the following components:    Bacteria, UA MANY (*)     RBC, UA 3-5 (*)     All other components within normal limits   TROPONIN       All other labs were within normal range or not returned as of this dictation.     EMERGENCY DEPARTMENT COURSE and DIFFERENTIAL DIAGNOSIS/MDM:   Vitals:    Vitals:    08/18/20 1039 08/18/20 1201 08/18/20 1234   BP: (!) 164/89 (!) 158/87 (!) 165/83   Pulse: 77 66 69   Resp: 18 18 18   Temp: 98.4 °F (36.9 °C)     TempSrc: Oral     SpO2: 100%     Weight: 165 lb (74.8 kg)     Height: 5' 5\" (1.651 m)         Chest  x-ray fails to demonstrate acute effusion or infiltrate. There is no pneumothorax. MDM    CRITICAL CARE TIME   Total Critical Care time was  minutes, excluding separately reportableprocedures. There was a high probability of clinicallysignificant/life threatening deterioration in the patient's condition which required my urgent intervention. CONSULTS:  None    PROCEDURES:  Unless otherwise noted below, none     Procedures    FINAL IMPRESSION      1.  Acute cystitis without hematuria          DISPOSITION/PLAN   DISPOSITION Decision To Discharge 08/18/2020 12:34:24 PM      PATIENT REFERRED TO:  MD Cherrie Don [phone]    In 2 days  Return for weakness, numbness, urine problems      DISCHARGE MEDICATIONS:  New Prescriptions    NITROFURANTOIN, MACROCRYSTAL-MONOHYDRATE, (MACROBID) 100 MG CAPSULE    Take 1 capsule by mouth 2 times daily for 5 days          (Please note that portions of this note were completed with a voice recognition program.  Efforts were made to edit the dictations but occasionally words are mis-transcribed.)    Araceli Eaton MD (electronically signed)  Attending Emergency Physician          Araceli Eaton MD  08/18/20 0280

## 2020-08-19 ENCOUNTER — OFFICE VISIT (OUTPATIENT)
Dept: GERIATRIC MEDICINE | Age: 85
End: 2020-08-19
Payer: MEDICARE

## 2020-08-20 PROCEDURE — 93010 ELECTROCARDIOGRAM REPORT: CPT | Performed by: INTERNAL MEDICINE

## 2020-08-29 VITALS
SYSTOLIC BLOOD PRESSURE: 124 MMHG | TEMPERATURE: 97.1 F | BODY MASS INDEX: 27.62 KG/M2 | HEART RATE: 73 BPM | RESPIRATION RATE: 16 BRPM | WEIGHT: 166 LBS | DIASTOLIC BLOOD PRESSURE: 75 MMHG

## 2020-08-30 PROBLEM — G30.1 LATE ONSET ALZHEIMER'S DISEASE WITHOUT BEHAVIORAL DISTURBANCE (HCC): Status: ACTIVE | Noted: 2020-08-30

## 2020-08-30 PROBLEM — R35.0 URINE FREQUENCY: Status: RESOLVED | Noted: 2020-01-07 | Resolved: 2020-08-30

## 2020-08-30 PROBLEM — F02.80 LATE ONSET ALZHEIMER'S DISEASE WITHOUT BEHAVIORAL DISTURBANCE (HCC): Status: ACTIVE | Noted: 2020-08-30

## 2020-08-30 PROBLEM — I48.11 LONGSTANDING PERSISTENT ATRIAL FIBRILLATION (HCC): Status: RESOLVED | Noted: 2019-11-17 | Resolved: 2020-08-30

## 2020-08-30 RX ORDER — ASPIRIN/CALCIUM/MAG/ALUMINUM 325 MG
1 TABLET ORAL DAILY
Qty: 90 TABLET | Refills: 3
Start: 2020-08-30 | End: 2020-08-31

## 2020-08-30 ASSESSMENT — PATIENT HEALTH QUESTIONNAIRE - PHQ9
2. FEELING DOWN, DEPRESSED OR HOPELESS: 0
SUM OF ALL RESPONSES TO PHQ9 QUESTIONS 1 & 2: 0
SUM OF ALL RESPONSES TO PHQ QUESTIONS 1-9: 0
1. LITTLE INTEREST OR PLEASURE IN DOING THINGS: 0
SUM OF ALL RESPONSES TO PHQ QUESTIONS 1-9: 0

## 2020-08-30 NOTE — PROGRESS NOTES
[facility]  [address] [place] [address]      8/19/2020    Moraima Malik  is a 80 y.o. in the  being seen for a f/u of   Chief Complaint   Patient presents with    Atrial Fibrillation     keeps falling! Spoke to nursing and ancillary staff regarding patient status, present state of health. Need for nursing care and assistance. Reviewed chart, labs, medications in NF chart and allergies. Aging in place at this time. HPI lives in  assisted living. Being seen for chronic illnesses. Reports from nurse, pt is confused on and off, sometimes the urine is positive for infection, but not all confusion episodes. She is cognitively impaired at times, more confused, forgetful, usually cooperative, can be re directed most times. No change in vision, hearing. No headaches or choking issues. Uses walker for locomotion in facility. No decrease in appetite, no change in B/B habits, has urine freq and detrusor instability incontinence of urine. No pain crises. NO fevers. Can make needs known to nurse. Continues to lose balance and fall , has chronic vertigo for many years. Past Medical History:   Diagnosis Date    Benign essential HTN 11/17/2019    Constipation 7/26/2016    Detrusor instability 11/17/2019    Dyslipidemia 11/17/2019    Heart valve disease 11/17/2019    Longstanding persistent atrial fibrillation 11/17/2019    Primary osteoarthritis involving multiple joints 11/17/2019     No past surgical history on file. No family history on file. Social History     Socioeconomic History    Marital status:       Spouse name: Not on file    Number of children: Not on file    Years of education: Not on file    Highest education level: Not on file   Occupational History    Not on file   Social Needs    Financial resource strain: Not on file    Food insecurity     Worry: Not on file     Inability: Not on file   [employer] needs Medical: Not on file     Non-medical: Not on file   Tobacco Use    Smoking status: Not on file   Substance and Sexual Activity    Alcohol use: Not on file    Drug use: Not on file    Sexual activity: Not on file   Lifestyle    Physical activity     Days per week: Not on file     Minutes per session: Not on file    Stress: Not on file   Relationships    Social connections     Talks on phone: Not on file     Gets together: Not on file     Attends Scientologist service: Not on file     Active member of club or organization: Not on file     Attends meetings of clubs or organizations: Not on file     Relationship status: Not on file    Intimate partner violence     Fear of current or ex partner: Not on file     Emotionally abused: Not on file     Physically abused: Not on file     Forced sexual activity: Not on file   Other Topics Concern    Not on file   Social History Narrative    Not on file       Allergies: Pcn [penicillins] and Vesicare [solifenacin]  NF MEDICATIONS REVIEWED    ROS:  See HPI  Constitutional: There are reports of confused behavioral issues, no change in appetite, fever, or weakness. No gross bleeding issues. Or difficulty ambulating to meals or activities. freq falls. Respiratory: denies SOB, dyspnea,   Cardiovascular: denies CP, lightheadedness, ++ dizzy  GI: No reports of change of bowel habits, no N/V/D/C. : no reports of change in bladder habits, incontinent urine  Extremities: No reports of uncontrolled pain issues, no  chronic edema    Physical exam:   /75   Pulse 73   Temp 97.1 °F (36.2 °C)   Resp 16   Wt 166 lb (75.3 kg)   BMI 27.62 kg/m²   Constitutional: Awake and alert sitting up in chair,   HEENT: normocephalic, PEAR, MMM, no cyanosis. NO neck mass visualized , facial asymmetry at baseline left droop.    Cardiovascular: Regular rate  Respiratory: LCTA, unlabored respirations  GI: abdomen ND  : no bladder tenderness  Extremities: tr ankle edema,  Psych: pleasant and able to follow simple commands, forgetful. speech clear    ASSESSMENT:     Diagnosis Orders   1. Chronic atrial fibrillation     2. Detrusor instability     3. Primary osteoarthritis involving multiple joints     4. Late onset Alzheimer's disease without behavioral disturbance ([facility] [address]         PLAN:   1. STOP coumadin due to freq falls she is now at high risk for brain bleed and lower risk for stroke. Start ASA 325mg po daily with food. 2. chronic UTIs  3. No pain issues  4. Spoke with POA, will start Aricept 5mg daily for one month and eval for any adverse effects. Agrees to stopping coumadin. Return in about 2 months (around 10/19/2020). Pertinent POC, labs, have been reviewed, continue same. Encourage fluids and good nutrition. Stress fall prevention strategies.     Kaushal Heath, APRN-CNP      Kaushal Heath, DNP, MSN, RN, GNP-BC, NP-C  Adult/Kishore Nurse Practitioner  [address]  Department of Geriatrics  8:30am-4:30pm   [phone]  After hours Answering service [phone]

## 2020-08-31 ENCOUNTER — OFFICE VISIT (OUTPATIENT)
Dept: PALLATIVE CARE | Age: 85
End: 2020-08-31
Payer: MEDICARE

## 2020-08-31 VITALS
DIASTOLIC BLOOD PRESSURE: 70 MMHG | RESPIRATION RATE: 18 BRPM | HEART RATE: 65 BPM | SYSTOLIC BLOOD PRESSURE: 132 MMHG | OXYGEN SATURATION: 97 %

## 2020-08-31 RX ORDER — WARFARIN SODIUM 6 MG/1
7 TABLET ORAL DAILY
COMMUNITY
End: 2020-08-31

## 2020-08-31 RX ORDER — PROPRANOLOL HYDROCHLORIDE 20 MG/1
20 TABLET ORAL NIGHTLY
COMMUNITY

## 2020-08-31 RX ORDER — ESCITALOPRAM OXALATE 10 MG/1
10 TABLET ORAL DAILY
COMMUNITY
End: 2021-08-24

## 2020-08-31 RX ORDER — DONEPEZIL HYDROCHLORIDE 5 MG/1
5 TABLET, FILM COATED ORAL NIGHTLY
COMMUNITY
End: 2022-01-09

## 2020-08-31 NOTE — PROGRESS NOTES
[employer] of [address] [facility], [address] [facility]    Subjective:      Patient Id:  Josefina Bush is a 80 y.o. female who presents today with   Chief Complaint   Patient presents with    Pain          HPI       Seen at [facility] DR. EBCCA SANTIAGO for sympom management rt advanced dementia and chronic rajesh and shoulder pain due to OA. She has had increased complaints of dizziness and was having visual hallucinations at my last visit. Many of her medications were discharged tosee if polypharmacy was an issue. She also had meclizine made routine as she will not ask for PRN medication any longer. Past Medical History:   Diagnosis Date    Benign essential HTN 11/17/2019    Constipation 7/26/2016    Detrusor instability 11/17/2019    Dyslipidemia 11/17/2019    Heart valve disease 11/17/2019    Longstanding persistent atrial fibrillation 11/17/2019    Primary osteoarthritis involving multiple joints 11/17/2019     No past surgical history on file. Social History     Socioeconomic History    Marital status:       Spouse name: Not on file    Number of children: Not on file    Years of education: Not on file    Highest education level: Not on file   Occupational History    Not on file   Social Needs    Financial resource strain: Not on file    Food insecurity     Worry: Not on file     Inability: Not on file    Transportation needs     Medical: Not on file     Non-medical: Not on file   Tobacco Use    Smoking status: Not on file   Substance and Sexual Activity    Alcohol use: Not on file    Drug use: Not on file    Sexual activity: Not on file   Lifestyle    Physical activity     Days per week: Not on file     Minutes per session: Not on file    Stress: Not on file   Relationships    Social connections     Talks on phone: Not on file     Gets together: Not on file     Attends Voodoo service: Not on file     Active member of club or organization: Not on file     Attends meetings of clubs or organizations: Not on file     Relationship status: Not on file    Intimate partner violence     Fear of current or ex partner: Not on file     Emotionally abused: Not on file     Physically abused: Not on file     Forced sexual activity: Not on file   Other Topics Concern    Not on file   Social History Narrative    Not on file     No family history on file. Allergies   Allergen Reactions    Pcn [Penicillins]     Vesicare [Solifenacin]      Current Outpatient Medications on File Prior to Visit   Medication Sig Dispense Refill    propranolol (INDERAL) 20 MG tablet Take 20 mg by mouth nightly      donepezil (ARICEPT) 5 MG tablet Take 5 mg by mouth nightly      escitalopram (LEXAPRO) 10 MG tablet Take 10 mg by mouth daily      acetaminophen (TYLENOL) 500 MG tablet Take 500 mg by mouth 2 times daily      diltiazem (DILACOR XR) 240 MG extended release capsule Take 240 mg by mouth daily      docusate sodium (COLACE) 100 MG capsule Take 100 mg by mouth 2 times daily      hydrochlorothiazide (HYDRODIURIL) 12.5 MG tablet Take 12.5 mg by mouth daily      meclizine (ANTIVERT) 25 MG tablet Take 25 mg by mouth 2 times daily       potassium chloride (KLOR-CON M) 10 MEQ extended release tablet Take 10 mEq by mouth daily      Cholecalciferol (VITAMIN D) 2000 units CAPS capsule Take 2,000 Units by mouth daily       No current facility-administered medications on file prior to visit. Review of Systems      Objective:   /70   Pulse 65   Resp 18   SpO2 97%     Physical Exam      Assessment:       Diagnosis Orders   1. Palliative care encounter  diclofenac sodium (VOLTAREN) 1 % GEL   2. Osteoarthritis, unspecified osteoarthritis type, unspecified site  diclofenac sodium (VOLTAREN) 1 % GEL   3. Chronic pain of both shoulders  diclofenac sodium (VOLTAREN) 1 % GEL   4. Chronic thoracic back pain, unspecified back pain laterality  diclofenac sodium (VOLTAREN) 1 % GEL   5.  Dementia with behavioral disturbance, unspecified dementia type ([facility]            Plan:      No orders of the defined types were placed in this encounter. Orders Placed This Encounter   Medications    diclofenac sodium (VOLTAREN) 1 % GEL     Sig: Apply 2 g topically 4 times daily To affected shoulders     Dispense:  240 g     Refill:  5       No follow-ups on file.     Michelle Willis, APRN - CNP

## 2020-10-14 ENCOUNTER — OFFICE VISIT (OUTPATIENT)
Dept: PALLATIVE CARE | Age: 85
End: 2020-10-14
Payer: MEDICARE

## 2020-10-14 VITALS
SYSTOLIC BLOOD PRESSURE: 110 MMHG | DIASTOLIC BLOOD PRESSURE: 68 MMHG | RESPIRATION RATE: 18 BRPM | OXYGEN SATURATION: 97 % | HEART RATE: 67 BPM | BODY MASS INDEX: 25.79 KG/M2 | WEIGHT: 155 LBS

## 2020-10-14 RX ORDER — ASPIRIN 325 MG
325 TABLET ORAL DAILY
COMMUNITY

## 2020-10-14 ASSESSMENT — ENCOUNTER SYMPTOMS
STRIDOR: 0
SORE THROAT: 0
COUGH: 0
TROUBLE SWALLOWING: 0
ANAL BLEEDING: 0
DIARRHEA: 0
EYE DISCHARGE: 0
VOMITING: 0
ABDOMINAL PAIN: 0
WHEEZING: 0
RECTAL PAIN: 0
APNEA: 0
COLOR CHANGE: 0
SHORTNESS OF BREATH: 0
BACK PAIN: 1
BLOOD IN STOOL: 0
CHEST TIGHTNESS: 0
ABDOMINAL DISTENTION: 0
VOICE CHANGE: 0
CONSTIPATION: 0
CHOKING: 0
NAUSEA: 0

## 2020-10-14 NOTE — PROGRESS NOTES
[employer] of [address] [facility], [address] [facility]    Subjective:      Patient Id:  Roxane Win is a 80 y.o. female who presents today with   Chief Complaint   Patient presents with    Pain          HPI   Seen at [facility] DR[facility] BECCA SANTIAGO for symptom management follow up rt chronic pain due to severe OA, PMXs for HTN, HLD, dementia, vertigo, A fib demeanor calm and relaxed, NAD, no sedation. She has lost 10 pounds in the last six months, nursing staff feel her appetite declined during an acute illness in August, but her appetite and weight seem to be stabilizing. No dysphagia, dysgeusia, or mouth sores; weight stable, Appetite is good. No GI or  concerns. No nik blood in stool or urine. Chronic pain at baseline, nursing staff are using acetaminophen and diclofenac gel routine, currently PAIN AD Zero. Auditory and visual hallucinations continue per nursing staff, but she is redirectable, not frightened, no significant agitation or anxiety. She has had multiple workups and it is felt this is a progression of dementia. SOB and Edema are at baseline. Past Medical History:   Diagnosis Date    Benign essential HTN 11/17/2019    Constipation 7/26/2016    Detrusor instability 11/17/2019    Dyslipidemia 11/17/2019    Heart valve disease 11/17/2019    Longstanding persistent atrial fibrillation 11/17/2019    Primary osteoarthritis involving multiple joints 11/17/2019     No past surgical history on file. Social History     Socioeconomic History    Marital status:       Spouse name: Not on file    Number of children: Not on file    Years of education: Not on file    Highest education level: Not on file   Occupational History    Not on file   Social Needs    Financial resource strain: Not on file    Food insecurity     Worry: Not on file     Inability: Not on file    Transportation needs     Medical: Not on file     Non-medical: Not on file   Tobacco Use    Smoking status: Not on file   Substance and Sexual Activity    Alcohol use: Not on file    Drug use: Not on file    Sexual activity: Not on file   Lifestyle    Physical activity     Days per week: Not on file     Minutes per session: Not on file    Stress: Not on file   Relationships    Social connections     Talks on phone: Not on file     Gets together: Not on file     Attends Denominational service: Not on file     Active member of club or organization: Not on file     Attends meetings of clubs or organizations: Not on file     Relationship status: Not on file    Intimate partner violence     Fear of current or ex partner: Not on file     Emotionally abused: Not on file     Physically abused: Not on file     Forced sexual activity: Not on file   Other Topics Concern    Not on file   Social History Narrative    Not on file     No family history on file.   Allergies   Allergen Reactions    Pcn [Penicillins]     Vesicare [Solifenacin]      Current Outpatient Medications on File Prior to Visit   Medication Sig Dispense Refill    aspirin 325 MG tablet Take 325 mg by mouth daily      propranolol (INDERAL) 20 MG tablet Take 20 mg by mouth nightly      donepezil (ARICEPT) 5 MG tablet Take 5 mg by mouth nightly      escitalopram (LEXAPRO) 10 MG tablet Take 10 mg by mouth daily      acetaminophen (TYLENOL) 500 MG tablet Take 500 mg by mouth 2 times daily      diltiazem (DILACOR XR) 240 MG extended release capsule Take 240 mg by mouth daily      docusate sodium (COLACE) 100 MG capsule Take 100 mg by mouth 2 times daily      hydrochlorothiazide (HYDRODIURIL) 12.5 MG tablet Take 12.5 mg by mouth daily      meclizine (ANTIVERT) 25 MG tablet Take 25 mg by mouth 2 times daily       potassium chloride (KLOR-CON M) 10 MEQ extended release tablet Take 10 mEq by mouth daily      diclofenac sodium (VOLTAREN) 1 % GEL Apply 2 g topically 4 times daily To affected shoulders 240 g 5    Cholecalciferol (VITAMIN D) 2000 units CAPS capsule Take 2,000 Units by mouth daily       No current facility-administered medications on file prior to visit. Review of Systems   Constitutional: Negative for activity change, appetite change, chills, diaphoresis, fatigue, fever and unexpected weight change. HENT: Negative for drooling, hearing loss, mouth sores, sore throat, trouble swallowing and voice change. Eyes: Negative for discharge and visual disturbance. Respiratory: Negative for apnea, cough, choking, chest tightness, shortness of breath, wheezing and stridor. Cardiovascular: Negative for chest pain, palpitations and leg swelling. Gastrointestinal: Negative for abdominal distention, abdominal pain, anal bleeding, blood in stool, constipation, diarrhea, nausea, rectal pain and vomiting. Genitourinary: Negative for difficulty urinating, dysuria, enuresis, frequency and hematuria. Musculoskeletal: Positive for arthralgias, back pain and gait problem. Negative for joint swelling and myalgias. Skin: Negative for color change, pallor, rash and wound. Allergic/Immunologic: Negative for food allergies and immunocompromised state. Neurological: Negative for dizziness, tremors, seizures, syncope, facial asymmetry, speech difficulty, weakness, light-headedness, numbness and headaches. Hematological: Negative for adenopathy. Does not bruise/bleed easily. Psychiatric/Behavioral: Positive for hallucinations. Negative for agitation, behavioral problems, confusion, decreased concentration, dysphoric mood, self-injury, sleep disturbance and suicidal ideas. The patient is not nervous/anxious and is not hyperactive. Objective:   /68   Pulse 67   Resp 18   Wt 155 lb (70.3 kg)   SpO2 97%   BMI 25.79 kg/m²     Physical Exam  Constitutional:       General: She is not in acute distress. Appearance: She is well-developed. She is not diaphoretic. HENT:      Head: Normocephalic and atraumatic.       Right Ear: tolerated  - Continue Acetaminophen 500 mg po every 12 hours, keep daily dose under 3000 mg  - Continue Diclofenac Gel qid to painful areas      Hallucinations rt neurocognitive disease   - Monitor for signs and symptoms of delirium as due to advanced age, history of cognitive difficulty's, and multiple co morbidities patient is high risk. - Offer continuous behavior redirection and orientation, avoid delirium inducing medication, expose to natural sunlight as much as possible, calm surroundings as much as possible.     Unintentional weight loss  - Small frequent meals  - Maximize calories and protein  - Consider nutritional supplements  - Will hold off on appetite stimulant for now as she seems to be improving    Ashley Lau, APRN - CNP

## 2020-10-21 ENCOUNTER — OFFICE VISIT (OUTPATIENT)
Dept: GERIATRIC MEDICINE | Age: 85
End: 2020-10-21
Payer: MEDICARE

## 2020-10-22 LAB
BASOPHILS ABSOLUTE: ABNORMAL
BASOPHILS RELATIVE PERCENT: 0.6 %
BUN BLDV-MCNC: 21 MG/DL
CALCIUM SERPL-MCNC: 8.9 MG/DL
CHLORIDE BLD-SCNC: 103 MMOL/L
CO2: 31 MMOL/L
CREAT SERPL-MCNC: 0.8 MG/DL
EOSINOPHILS ABSOLUTE: 0.2 /ΜL
EOSINOPHILS RELATIVE PERCENT: ABNORMAL
GFR CALCULATED: NORMAL
GLUCOSE BLD-MCNC: 87 MG/DL
HCT VFR BLD CALC: 35.3 % (ref 36–46)
HEMOGLOBIN: 11.6 G/DL (ref 12–16)
LYMPHOCYTES ABSOLUTE: 2.5 /ΜL
LYMPHOCYTES RELATIVE PERCENT: 33.4 %
MCH RBC QN AUTO: ABNORMAL PG
MCHC RBC AUTO-ENTMCNC: ABNORMAL G/DL
MCV RBC AUTO: ABNORMAL FL
MONOCYTES ABSOLUTE: 0.7 /ΜL
MONOCYTES RELATIVE PERCENT: 9 %
NEUTROPHILS ABSOLUTE: 4 /ΜL
NEUTROPHILS RELATIVE PERCENT: 53.9 %
PLATELET # BLD: 217 K/ΜL
PMV BLD AUTO: ABNORMAL FL
POTASSIUM SERPL-SCNC: 3.5 MMOL/L
RBC # BLD: ABNORMAL 10*6/UL
SODIUM BLD-SCNC: 142 MMOL/L
WBC # BLD: 7.3 10^3/ML

## 2020-10-24 LAB
BILIRUBIN, URINE: NEGATIVE
BLOOD, URINE: NEGATIVE
CLARITY: ABNORMAL
COLOR: YELLOW
GLUCOSE URINE: NEGATIVE
KETONES, URINE: NEGATIVE
LEUKOCYTE ESTERASE, URINE: NEGATIVE
NITRITE, URINE: NEGATIVE
PH UA: 6 (ref 4.5–8)
PROTEIN UA: NEGATIVE
SPECIFIC GRAVITY, URINE: 1.01
UROBILINOGEN, URINE: NORMAL

## 2020-10-26 LAB
FERRITIN: 149 NG/ML (ref 9–150)
IRON SATURATION: 27
IRON: 68
TOTAL IRON BINDING CAPACITY: 254

## 2020-11-04 VITALS
SYSTOLIC BLOOD PRESSURE: 130 MMHG | DIASTOLIC BLOOD PRESSURE: 82 MMHG | WEIGHT: 157 LBS | TEMPERATURE: 96.8 F | BODY MASS INDEX: 26.13 KG/M2 | HEART RATE: 82 BPM | RESPIRATION RATE: 18 BRPM

## 2020-11-09 NOTE — PROGRESS NOTES
[facility]  [address] [place] [address]      10/21/2020    Shamar Malik  is a 80 y.o. in the NF being seen for a f/u of   Chief Complaint   Patient presents with    Hallucinations     2 days ago       Spoke to nursing and ancillary staff regarding patient status, present state of health. Need for nursing care and assistance. Reviewed chart, labs, medications in NF chart and allergies. Aging in place at this time. HPI lives in  assisted living. Being seen for hallucinations 2 days ago was seeing people in her room. She has hx of this also has UTI once in a while that worsen hallucinations. She denied abdominal tenderness, fever. Does c/o urine frequency. No UTI noted. Reports from nurse, pt is at baseline, no futher hallucinations,  no change in vision, hearing. No headaches or choking issues. Uses walker for locomotion in facility. No decrease in appetite, no change in B/B habits. No pain crises. NO fevers. Can make needs known to nurse. Past Medical History:   Diagnosis Date    Benign essential HTN 11/17/2019    Constipation 7/26/2016    Detrusor instability 11/17/2019    Dyslipidemia 11/17/2019    Heart valve disease 11/17/2019    Longstanding persistent atrial fibrillation 11/17/2019    Primary osteoarthritis involving multiple joints 11/17/2019     No past surgical history on file. No family history on file. Social History     Socioeconomic History    Marital status:       Spouse name: Not on file    Number of children: Not on file    Years of education: Not on file    Highest education level: Not on file   Occupational History    Not on file   Social Needs    Financial resource strain: Not on file    Food insecurity     Worry: Not on file     Inability: Not on file    Transportation needs     Medical: Not on file     Non-medical: Not on file   Tobacco Use    Smoking status: Not on file   Substance and Sexual Activity    Alcohol use: Not on file    Drug use: Not on file    Sexual activity: Not on file   Lifestyle    Physical activity     Days per week: Not on file     Minutes per session: Not on file    Stress: Not on file   Relationships    Social connections     Talks on phone: Not on file     Gets together: Not on file     Attends Yazidism service: Not on file     Active member of club or organization: Not on file     Attends meetings of clubs or organizations: Not on file     Relationship status: Not on file    Intimate partner violence     Fear of current or ex partner: Not on file     Emotionally abused: Not on file     Physically abused: Not on file     Forced sexual activity: Not on file   Other Topics Concern    Not on file   Social History Narrative    Not on file       Allergies: Pcn [penicillins] and Vesicare [solifenacin]  NF MEDICATIONS REVIEWED    ROS:  See HPI  Constitutional: There are no reports of behavioral issues, change in appetite, fever, or weakness. No gross bleeding issues. Some difficulty ambulating to meals or activities. Respiratory: denies SOB, dyspnea,   Cardiovascular: denies CP, lightheadedness,   GI: No reports of change of bowel habits, no N/V/D/C. : no reports of change in bladder habits  Extremities: No reports of uncontrolled pain issues, no  chronic edema    Physical exam:   /82   Pulse 82   Temp 96.8 °F (36 °C)   Resp 18   Wt 157 lb (71.2 kg)   BMI 26.13 kg/m²   Constitutional: Awake and alert sitting up in chair,   HEENT: normocephalic, PEAR, MMM, no cyanosis. NO neck mass visualized , facial asymmetry note from [place]. Cardiovascular: Regular rate  Respiratory: LCTA, unlabored respirations  GI: abdomen ND  : no bladder tenderness  Extremities: no edema,  Psych: pleasant and able to follow simple commands, normal thought content, speech clear    ASSESSMENT:     Diagnosis Orders   1. Arthritis pain     2. Hallucination, visual         PLAN:   1.  Tylenol ordered routine by Palliative , also increased Votaren gel to qid  2. No UTI,  most likely dementia     Return in about 3 months (around 1/21/2021). Pertinent POC, labs, have been reviewed, continue same. Encourage fluids and good nutrition. Stress fall prevention strategies.     Christina Sahu, APRN-CNP      Christina Sahu DNP, MSN, RN, GNP-BC, NP-C  Adult/Kishore Nurse Practitioner  [facility] [address] [facility]  Department of Geriatrics  8:30am-4:30pm   [phone]  After hours Answering service [phone]

## 2020-12-01 ENCOUNTER — OFFICE VISIT (OUTPATIENT)
Dept: PALLATIVE CARE | Age: 85
End: 2020-12-01
Payer: MEDICARE

## 2020-12-01 ASSESSMENT — ENCOUNTER SYMPTOMS
ANAL BLEEDING: 0
TROUBLE SWALLOWING: 0
ABDOMINAL PAIN: 0
COUGH: 0
ABDOMINAL DISTENTION: 0
SORE THROAT: 0
BLOOD IN STOOL: 0
STRIDOR: 0
CONSTIPATION: 0
APNEA: 0
WHEEZING: 0
RECTAL PAIN: 0
BACK PAIN: 0
EYE DISCHARGE: 0
CHEST TIGHTNESS: 0
SHORTNESS OF BREATH: 0
NAUSEA: 0
CHOKING: 0
VOMITING: 0
DIARRHEA: 0
VOICE CHANGE: 0
COLOR CHANGE: 0

## 2020-12-01 NOTE — PROGRESS NOTES
[employer] of [address] [facility], [address] [facility]    Subjective:      Patient Id:  Sherren Portal is a 80 y.o. female who presents today with   Chief Complaint   Patient presents with    Follow-up          HPI       TELEHEALTH EVALUATION -- Audio/Visual (During RAZRI-05 public health emergency)     Due to COVID 19 outbreak, patient's office visit was converted to a virtual visit. Patient was contacted and agreed to proceed with a virtual visit via Doxy. me  The risks and benefits of converting to a virtual visit were discussed in light of the current infectious disease epidemic. Patient also understood that insurance coverage and co-pays are up to their individual insurance plans. Symptom management follow up rt Dementia and joint pain, nurse present for visit. Demeanor calm and relaxed, NAD, no sedation. She tells me she has had, \" a good day\". Her mentation is at baseline, confused nut directable and can make her needs know to staff. Multi joint pain is well controlled with current POC, she is at baseline with ADLS and function. Weight stable, Appetite is good. No GI or  concerns. No nik blood in stool or urine. Negative SOB or edema,excessive fatigue, fever, chills, myalgias, increased sputum production or cough, nausea, vomiting, chest pain, or orthopnea. Negative significant Sleep disturbance, depression, anxiety, or agitation episodes    Past Medical History:   Diagnosis Date    Benign essential HTN 11/17/2019    Constipation 7/26/2016    Detrusor instability 11/17/2019    Dyslipidemia 11/17/2019    Heart valve disease 11/17/2019    Longstanding persistent atrial fibrillation 11/17/2019    Primary osteoarthritis involving multiple joints 11/17/2019     No past surgical history on file. Social History     Socioeconomic History    Marital status:       Spouse name: Not on file    Number of children: Not on file    Years of education: Not on file    Highest education level: Not on file   Occupational History    Not on file   Social Needs    Financial resource strain: Not on file    Food insecurity     Worry: Not on file     Inability: Not on file    Transportation needs     Medical: Not on file     Non-medical: Not on file   Tobacco Use    Smoking status: Not on file   Substance and Sexual Activity    Alcohol use: Not on file    Drug use: Not on file    Sexual activity: Not on file   Lifestyle    Physical activity     Days per week: Not on file     Minutes per session: Not on file    Stress: Not on file   Relationships    Social connections     Talks on phone: Not on file     Gets together: Not on file     Attends Episcopal service: Not on file     Active member of club or organization: Not on file     Attends meetings of clubs or organizations: Not on file     Relationship status: Not on file    Intimate partner violence     Fear of current or ex partner: Not on file     Emotionally abused: Not on file     Physically abused: Not on file     Forced sexual activity: Not on file   Other Topics Concern    Not on file   Social History Narrative    Not on file     No family history on file.   Allergies   Allergen Reactions    Pcn [Penicillins]     Vesicare [Solifenacin]      Current Outpatient Medications on File Prior to Visit   Medication Sig Dispense Refill    aspirin 325 MG tablet Take 325 mg by mouth daily      propranolol (INDERAL) 20 MG tablet Take 20 mg by mouth nightly      donepezil (ARICEPT) 5 MG tablet Take 5 mg by mouth nightly      escitalopram (LEXAPRO) 10 MG tablet Take 10 mg by mouth daily      diclofenac sodium (VOLTAREN) 1 % GEL Apply 2 g topically 4 times daily To affected shoulders 240 g 5    acetaminophen (TYLENOL) 500 MG tablet Take 500 mg by mouth 2 times daily      diltiazem (DILACOR XR) 240 MG extended release capsule Take 240 mg by mouth daily      docusate sodium (COLACE) 100 MG capsule Take 100 mg by mouth 2 times daily      hydrochlorothiazide (HYDRODIURIL) 12.5 MG tablet Take 12.5 mg by mouth daily      meclizine (ANTIVERT) 25 MG tablet Take 25 mg by mouth 2 times daily       potassium chloride (KLOR-CON M) 10 MEQ extended release tablet Take 10 mEq by mouth daily      Cholecalciferol (VITAMIN D) 2000 units CAPS capsule Take 2,000 Units by mouth daily       No current facility-administered medications on file prior to visit. Review of Systems   Constitutional: Negative for activity change, appetite change, chills, diaphoresis, fatigue, fever and unexpected weight change. HENT: Negative for drooling, hearing loss, mouth sores, sore throat, trouble swallowing and voice change. Eyes: Negative for discharge and visual disturbance. Respiratory: Negative for apnea, cough, choking, chest tightness, shortness of breath, wheezing and stridor. Cardiovascular: Negative for chest pain, palpitations and leg swelling. Gastrointestinal: Negative for abdominal distention, abdominal pain, anal bleeding, blood in stool, constipation, diarrhea, nausea, rectal pain and vomiting. Genitourinary: Negative for difficulty urinating, dysuria, enuresis, frequency and hematuria. Musculoskeletal: Positive for arthralgias. Negative for back pain, gait problem, joint swelling and myalgias. Skin: Negative for color change, pallor, rash and wound. Allergic/Immunologic: Negative for food allergies and immunocompromised state. Neurological: Negative for dizziness, tremors, seizures, syncope, facial asymmetry, speech difficulty, weakness, light-headedness, numbness and headaches. Hematological: Negative for adenopathy. Does not bruise/bleed easily. Psychiatric/Behavioral: Positive for confusion. Negative for agitation, behavioral problems, decreased concentration, dysphoric mood, hallucinations, self-injury, sleep disturbance and suicidal ideas. The patient is not nervous/anxious and is not hyperactive. Objective: There were no vitals taken for this visit. Physical Exam  Constitutional:       Appearance: Normal appearance. HENT:      Head: Normocephalic and atraumatic. Right Ear: External ear normal.      Left Ear: External ear normal.      Nose: Nose normal.      Mouth/Throat:      Lips: Pink. Mouth: Mucous membranes are moist.   Eyes:      General: Lids are normal.   Neck:      Musculoskeletal: Full passive range of motion without pain. Pulmonary:      Effort: Pulmonary effort is normal.   Neurological:      Mental Status: She is alert. Mental status is at baseline. Psychiatric:         Mood and Affect: Mood normal.         Speech: Speech normal.         Behavior: Behavior is cooperative. Cognition and Memory: Cognition is impaired. Memory is impaired. Assessment:       Diagnosis Orders   1. Palliative care encounter     2. Arthralgia, unspecified joint     3. Osteoarthritis, unspecified osteoarthritis type, unspecified site     4. Anxiety     5. Dementia with behavioral disturbance, unspecified dementia type (HCC)            Plan:   Pain rt OA  - Contine Acetaminophen  - Continue Diclofenac gel  - Heat or ice to painful areas, whichever is preferred  - Gentle ROM exercises  Pt is able to maintain adequate functional level and participate in ADLs  Nursing advised to call for increasing or uncontrolled pain    Dementia/ Anxiety/ Agitation  Monitor for signs and symptoms of delirium as due to advanced age, history of cognitive difficulty, acute illness, and multiple comorbidities patient is high risk. Offer continuous behavior redirection and orientation, avoid delirium inducing medication, expose to natural sunlight as much as possible, calm surroundings as much as possible.       Alvin Soto, APRN - CNP

## 2021-01-13 ENCOUNTER — OFFICE VISIT (OUTPATIENT)
Dept: GERIATRIC MEDICINE | Age: 86
End: 2021-01-13
Payer: MEDICARE

## 2021-01-13 DIAGNOSIS — N32.81 DETRUSOR INSTABILITY: ICD-10-CM

## 2021-01-13 DIAGNOSIS — F02.80 LATE ONSET ALZHEIMER'S DISEASE WITHOUT BEHAVIORAL DISTURBANCE (HCC): Primary | ICD-10-CM

## 2021-01-13 DIAGNOSIS — G30.1 LATE ONSET ALZHEIMER'S DISEASE WITHOUT BEHAVIORAL DISTURBANCE (HCC): Primary | ICD-10-CM

## 2021-01-14 LAB
A/G RATIO: 1.3
ALBUMIN SERPL-MCNC: 3.2 G/DL
ALP BLD-CCNC: 37 U/L
ALT SERPL-CCNC: 8 U/L
AST SERPL-CCNC: 12 U/L
BASOPHILS ABSOLUTE: ABNORMAL
BASOPHILS RELATIVE PERCENT: 0.6 %
BILIRUB SERPL-MCNC: 0.4 MG/DL (ref 0.1–1.4)
BILIRUBIN DIRECT: NORMAL
BILIRUBIN, INDIRECT: NORMAL
BUN BLDV-MCNC: 29 MG/DL
CALCIUM SERPL-MCNC: 9 MG/DL
CHLORIDE BLD-SCNC: 104 MMOL/L
CO2: 30 MMOL/L
CREAT SERPL-MCNC: 0.7 MG/DL
EOSINOPHILS ABSOLUTE: 0.2 /ΜL
EOSINOPHILS RELATIVE PERCENT: ABNORMAL
GFR CALCULATED: NORMAL
GLOBULIN: NORMAL
GLUCOSE BLD-MCNC: 84 MG/DL
HCT VFR BLD CALC: 35.4 % (ref 36–46)
HEMOGLOBIN: 11.7 G/DL (ref 12–16)
LYMPHOCYTES ABSOLUTE: 2.2 /ΜL
LYMPHOCYTES RELATIVE PERCENT: 29.1 %
MCH RBC QN AUTO: 32 PG
MCHC RBC AUTO-ENTMCNC: 33.1 G/DL
MCV RBC AUTO: 96.6 FL
MONOCYTES ABSOLUTE: 0.8 /ΜL
MONOCYTES RELATIVE PERCENT: 10.3 %
NEUTROPHILS ABSOLUTE: 4.4 /ΜL
NEUTROPHILS RELATIVE PERCENT: 57.7 %
PLATELET # BLD: 221 K/ΜL
PMV BLD AUTO: 8.9 FL
POTASSIUM SERPL-SCNC: 3.9 MMOL/L
PROTEIN TOTAL: 5.6 G/DL
RBC # BLD: 3.67 10^6/ΜL
SODIUM BLD-SCNC: 142 MMOL/L
WBC # BLD: 7.7 10^3/ML

## 2021-01-18 ENCOUNTER — OFFICE VISIT (OUTPATIENT)
Dept: PALLATIVE CARE | Age: 86
End: 2021-01-18
Payer: MEDICARE

## 2021-01-18 VITALS
DIASTOLIC BLOOD PRESSURE: 74 MMHG | RESPIRATION RATE: 18 BRPM | SYSTOLIC BLOOD PRESSURE: 120 MMHG | HEART RATE: 70 BPM | OXYGEN SATURATION: 97 %

## 2021-01-18 DIAGNOSIS — M19.90 OSTEOARTHRITIS, UNSPECIFIED OSTEOARTHRITIS TYPE, UNSPECIFIED SITE: ICD-10-CM

## 2021-01-18 DIAGNOSIS — F03.92 HALLUCINATIONS DUE TO LATE ONSET DEMENTIA: ICD-10-CM

## 2021-01-18 DIAGNOSIS — G30.9 ALZHEIMER'S DEMENTIA WITH BEHAVIORAL DISTURBANCE, UNSPECIFIED TIMING OF DEMENTIA ONSET: ICD-10-CM

## 2021-01-18 DIAGNOSIS — F02.81 ALZHEIMER'S DEMENTIA WITH BEHAVIORAL DISTURBANCE, UNSPECIFIED TIMING OF DEMENTIA ONSET: ICD-10-CM

## 2021-01-18 DIAGNOSIS — Z51.5 PALLIATIVE CARE ENCOUNTER: Primary | ICD-10-CM

## 2021-01-18 DIAGNOSIS — M25.50 ARTHRALGIA, UNSPECIFIED JOINT: ICD-10-CM

## 2021-01-18 RX ORDER — GUAIFENESIN 100 MG/5ML
200 SYRUP ORAL 4 TIMES DAILY PRN
COMMUNITY

## 2021-01-18 ASSESSMENT — ENCOUNTER SYMPTOMS
DIARRHEA: 0
SORE THROAT: 0
TROUBLE SWALLOWING: 0
ABDOMINAL PAIN: 0
ABDOMINAL DISTENTION: 0
CONSTIPATION: 0
CHEST TIGHTNESS: 0
VOMITING: 0
RECTAL PAIN: 0
CHOKING: 0
ANAL BLEEDING: 0
BACK PAIN: 0
NAUSEA: 0
SHORTNESS OF BREATH: 0
BLOOD IN STOOL: 0
STRIDOR: 0
COLOR CHANGE: 0
COUGH: 0
EYE DISCHARGE: 0
APNEA: 0
WHEEZING: 0
VOICE CHANGE: 0

## 2021-01-18 NOTE — PROGRESS NOTES
[employer] of [address] [facility], [address] [facility]    Subjective:      Patient Id:  Rufino Rodgers is a 80 y.o. female who presents today with   Chief Complaint   Patient presents with    Follow-up          HPI     Seen at [facility] DR. BECCA SANTIAGO for symptom management rt Alzheimer's disease with behavioral disturbance, depression, A fib, vertigo,OA, heart valve disease. Yearly lab work ordered by PCP, all WNL. She feels her chronic joint pain is well controlled with current POC, She has no unusual fatigue, cough, SOB, BE edema is at baseline. Appetite and weight are stable. No Gi or Gu complaints. She tells me of multiple episodes of men staring at her through the window, sometimes in the day, sometimes at night, Sometimes groups of them are working, sometimes socializing, but one of the men will stare at her, They make no threatening gestures. She will ask if the staff if they see anyone and they will reassures her they don't. Candance Clink is pretty sure these people are not real and it is not happening every day, but so,at times they are quite upsetting to her. These hallucinations do not happen everyday, she has seen none in the past few days. Past Medical History:   Diagnosis Date    Benign essential HTN 11/17/2019    Constipation 7/26/2016    Detrusor instability 11/17/2019    Dyslipidemia 11/17/2019    Heart valve disease 11/17/2019    Longstanding persistent atrial fibrillation 11/17/2019    Primary osteoarthritis involving multiple joints 11/17/2019     No past surgical history on file. Social History     Socioeconomic History    Marital status:       Spouse name: Not on file    Number of children: Not on file    Years of education: Not on file    Highest education level: Not on file   Occupational History    Not on file   Social Needs    Financial resource strain: Not on file    Food insecurity     Worry: Not on file     Inability: Not on file   Collplant needs     Medical: Not on file     Non-medical: Not on file   Tobacco Use    Smoking status: Not on file   Substance and Sexual Activity    Alcohol use: Not on file    Drug use: Not on file    Sexual activity: Not on file   Lifestyle    Physical activity     Days per week: Not on file     Minutes per session: Not on file    Stress: Not on file   Relationships    Social connections     Talks on phone: Not on file     Gets together: Not on file     Attends Episcopalian service: Not on file     Active member of club or organization: Not on file     Attends meetings of clubs or organizations: Not on file     Relationship status: Not on file    Intimate partner violence     Fear of current or ex partner: Not on file     Emotionally abused: Not on file     Physically abused: Not on file     Forced sexual activity: Not on file   Other Topics Concern    Not on file   Social History Narrative    Not on file     No family history on file.   Allergies   Allergen Reactions    Pcn [Penicillins]     Vesicare [Solifenacin]      Current Outpatient Medications on File Prior to Visit   Medication Sig Dispense Refill    guaiFENesin (ROBITUSSIN) 100 MG/5ML syrup Take 200 mg by mouth 4 times daily as needed for Cough      aspirin 325 MG tablet Take 325 mg by mouth daily      propranolol (INDERAL) 20 MG tablet Take 20 mg by mouth nightly      donepezil (ARICEPT) 5 MG tablet Take 5 mg by mouth nightly      escitalopram (LEXAPRO) 10 MG tablet Take 10 mg by mouth daily      diclofenac sodium (VOLTAREN) 1 % GEL Apply 2 g topically 4 times daily To affected shoulders 240 g 5    acetaminophen (TYLENOL) 500 MG tablet Take 500 mg by mouth 2 times daily      diltiazem (DILACOR XR) 240 MG extended release capsule Take 240 mg by mouth daily      docusate sodium (COLACE) 100 MG capsule Take 100 mg by mouth 2 times daily      hydrochlorothiazide (HYDRODIURIL) 12.5 MG tablet Take 12.5 mg by mouth daily      meclizine (ANTIVERT) 25 MG tablet Take 25 mg by mouth 2 times daily       potassium chloride (KLOR-CON M) 10 MEQ extended release tablet Take 10 mEq by mouth daily      Cholecalciferol (VITAMIN D) 2000 units CAPS capsule Take 2,000 Units by mouth daily       No current facility-administered medications on file prior to visit. Review of Systems   Constitutional: Negative for activity change, appetite change, chills, diaphoresis, fatigue, fever and unexpected weight change. HENT: Negative for drooling, hearing loss, mouth sores, sore throat, trouble swallowing and voice change. Eyes: Negative for discharge and visual disturbance. Respiratory: Negative for apnea, cough, choking, chest tightness, shortness of breath, wheezing and stridor. Cardiovascular: Negative for chest pain, palpitations and leg swelling. Gastrointestinal: Negative for abdominal distention, abdominal pain, anal bleeding, blood in stool, constipation, diarrhea, nausea, rectal pain and vomiting. Genitourinary: Negative for difficulty urinating, dysuria, enuresis, frequency and hematuria. Musculoskeletal: Positive for arthralgias. Negative for back pain, gait problem, joint swelling and myalgias. Skin: Negative for color change, pallor, rash and wound. Allergic/Immunologic: Negative for food allergies and immunocompromised state. Neurological: Positive for dizziness. Negative for tremors, seizures, syncope, facial asymmetry, speech difficulty, weakness, light-headedness, numbness and headaches. Hematological: Negative for adenopathy. Does not bruise/bleed easily. Psychiatric/Behavioral: Negative for agitation, behavioral problems, confusion, decreased concentration, dysphoric mood, hallucinations, self-injury, sleep disturbance and suicidal ideas. The patient is not nervous/anxious and is not hyperactive.           Objective:   /74   Pulse 70   Resp 18   SpO2 97%     Physical Exam  Constitutional:       General: She is not in acute distress. Appearance: She is well-developed. She is not diaphoretic. HENT:      Head: Normocephalic and atraumatic. Right Ear: External ear normal.      Left Ear: External ear normal.      Nose: Nose normal.      Mouth/Throat:      Pharynx: No oropharyngeal exudate. Eyes:      General: No scleral icterus. Right eye: No discharge. Left eye: No discharge. Conjunctiva/sclera: Conjunctivae normal.      Pupils: Pupils are equal, round, and reactive to light. Neck:      Musculoskeletal: Normal range of motion and neck supple. Thyroid: No thyromegaly. Vascular: No JVD. Trachea: No tracheal deviation. Cardiovascular:      Rate and Rhythm: Normal rate and regular rhythm. Heart sounds: Normal heart sounds. Pulmonary:      Effort: Pulmonary effort is normal. No respiratory distress. Breath sounds: No stridor. Decreased breath sounds present. No wheezing or rales. Chest:      Chest wall: No tenderness. Abdominal:      General: Bowel sounds are normal. There is no distension. Palpations: Abdomen is soft. There is no mass. Tenderness: There is no abdominal tenderness. There is no guarding or rebound. Musculoskeletal: Normal range of motion. General: No tenderness or deformity. Right lower le+ Edema present. Left lower le+ Edema present. Lymphadenopathy:      Cervical: No cervical adenopathy. Skin:     General: Skin is warm and dry. Capillary Refill: Capillary refill takes less than 2 seconds. Findings: No erythema or rash. Neurological:      Mental Status: She is alert and oriented to person, place, and time. Psychiatric:         Behavior: Behavior normal.         Thought Content: Thought content normal.           Assessment:       Diagnosis Orders   1. Palliative care encounter     2. Arthralgia, unspecified joint     3.  Alzheimer's dementia with behavioral disturbance, unspecified timing of dementia onset ([facility] [address]     4. 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 Osteoarthritis, unspecified osteoarthritis type, unspecified site            Plan:    Multi Joint Pain  - Continue Voltaren gel  - Continue Acetaminophen 500 mg po bid  - Exercise as tolerated  - Change position frequently    Hallucinations most likely due to late onset dementia  - This has been going on for some time, recent lab work all WNL  - Consider Neurology referral  - Encouraged her to seek support when she sees things that disturb her. We discussed it is good safety practice to alert staff when she sees strange people. We discussed it  May be hallucinations due to dementia.   - Much emotional support and active listening    JESSENIA Chowdhury - CNP

## 2021-02-03 LAB
BILIRUBIN, URINE: NEGATIVE
BLOOD, URINE: NEGATIVE
CLARITY: ABNORMAL
COLOR: YELLOW
GLUCOSE URINE: NORMAL
KETONES, URINE: NEGATIVE
LEUKOCYTE ESTERASE, URINE: NEGATIVE
NITRITE, URINE: NEGATIVE
PH UA: 7 (ref 4.5–8)
PROTEIN UA: NEGATIVE
SPECIFIC GRAVITY, URINE: 1.01
UROBILINOGEN, URINE: NORMAL

## 2021-02-12 NOTE — PROGRESS NOTES
Use    Smoking status: Not on file   Substance and Sexual Activity    Alcohol use: Not on file    Drug use: Not on file    Sexual activity: Not on file   Lifestyle    Physical activity     Days per week: Not on file     Minutes per session: Not on file    Stress: Not on file   Relationships    Social connections     Talks on phone: Not on file     Gets together: Not on file     Attends [employer] service: Not on file     Active member of club or organization: Not on file     Attends meetings of clubs or organizations: Not on file     Relationship status: Not on file    Intimate partner violence     Fear of current or ex partner: Not on file     Emotionally abused: Not on file     Physically abused: Not on file     Forced sexual activity: Not on file   Other Topics Concern    Not on file   Social History Narrative    Not on file       Allergies: Pcn [penicillins] and Vesicare [solifenacin]  NF MEDICATIONS REVIEWED    ROS:  See HPI  Constitutional: There are no reports of change in appetite, fever, or weakness. No gross bleeding issues. Or difficulty ambulating to meals or activities. Ataxic gait is multifactorial, still has falls, no anti-coagulation due to falls  Respiratory: denies SOB, dyspnea,   Cardiovascular: denies CP, lightheadedness,   GI: No reports of change of bowel habits, no N/V/D/C. : no reports of change in bladder habits, she c/o incontinence and frequency. Extremities: No reports of uncontrolled pain issues, no gross chronic edema issues in ankles     Physical exam:   Constitutional: Awake and alert sitting up in chair,   VS  T  96.6  P  86  R  16  BP  136/76  154lb    HEENT: normocephalic, PEAR, MMM, no cyanosis. NO neck mass visualized , facial asymmetry noted at baseline.     Cardiovascular: Regular rate  Respiratory: LCTA, unlabored respirations  GI: abdomen ND  : no bladder tenderness  Extremities: no edema,  Psych: pleasant and able to follow simple commands, normal thought content, speech clear    ASSESSMENT:     Diagnosis Orders   1. Late onset Alzheimer's disease without behavioral disturbance ([facility] [address]     2. Detrusor instability         PLAN:   1. hallucinations on and off as in Lewy body dementia or PD dementia. DO NOT increase  antiicholinersterase inhib due to dreams   2. Infrequent UTI. C/o worse urine incontinence. Return in about 2 months (around 3/13/2021). Pertinent POC, labs, have been reviewed, continue same. Encourage fluids and good nutrition. Stress fall prevention strategies.     Tito Romo, APRN-CNP      Tito Romo, DNP, MSN, RN, GNP-BC, NP-C  Adult/[facility]ish[facility] Nurse Practitioner  [address]  Department of Geriatrics  8:30am-4:30pm   [phone]  After hours Answering service [phone]

## 2021-02-24 ENCOUNTER — OFFICE VISIT (OUTPATIENT)
Dept: GERIATRIC MEDICINE | Age: 86
End: 2021-02-24
Payer: MEDICARE

## 2021-02-24 DIAGNOSIS — F03.918 BEHAVIORAL AND PSYCHOLOGICAL SYMPTOMS OF DEMENTIA: Primary | ICD-10-CM

## 2021-02-24 DIAGNOSIS — R41.0 CONFUSION: ICD-10-CM

## 2021-02-24 DIAGNOSIS — F02.818 LATE ONSET ALZHEIMER'S DISEASE WITH BEHAVIORAL DISTURBANCE (HCC): ICD-10-CM

## 2021-02-24 DIAGNOSIS — G30.1 LATE ONSET ALZHEIMER'S DISEASE WITH BEHAVIORAL DISTURBANCE (HCC): ICD-10-CM

## 2021-02-25 LAB
BILIRUBIN, URINE: NORMAL
BLOOD, URINE: NORMAL
CLARITY: NORMAL
COLOR: NORMAL
GLUCOSE URINE: NORMAL
KETONES, URINE: NORMAL
LEUKOCYTE ESTERASE, URINE: NORMAL
NITRITE, URINE: NORMAL
PH UA: NORMAL
PROTEIN UA: NORMAL
SPECIFIC GRAVITY, URINE: NORMAL
UROBILINOGEN, URINE: NORMAL

## 2021-02-26 LAB
BILIRUBIN, URINE: NEGATIVE
BLOOD, URINE: NEGATIVE
CLARITY: ABNORMAL
COLOR: YELLOW
GLUCOSE URINE: ABNORMAL
KETONES, URINE: NEGATIVE
LEUKOCYTE ESTERASE, URINE: ABNORMAL
NITRITE, URINE: NEGATIVE
PH UA: 6 (ref 4.5–8)
PROTEIN UA: NEGATIVE
SPECIFIC GRAVITY, URINE: 1.02
UROBILINOGEN, URINE: NORMAL

## 2021-03-01 ENCOUNTER — OFFICE VISIT (OUTPATIENT)
Dept: PALLATIVE CARE | Age: 86
End: 2021-03-01
Payer: MEDICARE

## 2021-03-01 DIAGNOSIS — G30.9 ALZHEIMER'S DEMENTIA WITH BEHAVIORAL DISTURBANCE, UNSPECIFIED TIMING OF DEMENTIA ONSET: ICD-10-CM

## 2021-03-01 DIAGNOSIS — F41.9 ANXIETY: ICD-10-CM

## 2021-03-01 DIAGNOSIS — M25.50 ARTHRALGIA, UNSPECIFIED JOINT: ICD-10-CM

## 2021-03-01 DIAGNOSIS — R44.1 VISUAL HALLUCINATIONS: Primary | ICD-10-CM

## 2021-03-01 DIAGNOSIS — F02.81 ALZHEIMER'S DEMENTIA WITH BEHAVIORAL DISTURBANCE, UNSPECIFIED TIMING OF DEMENTIA ONSET: ICD-10-CM

## 2021-03-01 RX ORDER — QUETIAPINE FUMARATE 25 MG/1
12.5 TABLET, FILM COATED ORAL NIGHTLY
Qty: 60 TABLET | Refills: 0 | Status: SHIPPED | OUTPATIENT
Start: 2021-03-01 | End: 2021-04-12

## 2021-03-01 ASSESSMENT — ENCOUNTER SYMPTOMS
DIARRHEA: 0
BLOOD IN STOOL: 0
STRIDOR: 0
VOICE CHANGE: 0
NAUSEA: 0
CONSTIPATION: 0
WHEEZING: 0
ANAL BLEEDING: 0
CHOKING: 0
SHORTNESS OF BREATH: 0
VOMITING: 0
COUGH: 0
ABDOMINAL DISTENTION: 0
SORE THROAT: 0
RECTAL PAIN: 0
CHEST TIGHTNESS: 0
ABDOMINAL PAIN: 0
APNEA: 0
EYE DISCHARGE: 0
BACK PAIN: 0
TROUBLE SWALLOWING: 0
COLOR CHANGE: 0

## 2021-03-01 NOTE — PROGRESS NOTES
[employer] of [address] [facility], [address]    Subjective:      Patient Id:  Shikha Perry is a 80 y.o. female who presents today with   Chief Complaint   Patient presents with    Follow-up          HPI     Seen at [facility] DR. BECCA SANTIAGO for symptom management rt Alzheimer's disease with behavioral disturbance, depression, A fib, vertigo,OA, heart valve disease. She feels her chronic joint pain is well controlled with current POC, She has no unusual fatigue, cough, SOB, BE edema is at baseline. Appetite and weight are stable. No GI or  complaints. She is still having visual hallucinations of men staring at her and children sitting on her furniture. Sometimes the hallucinations are pleasing, sometimes scary. She is now letting the staff know she is having them. She has been tested and treated for UTI's several times, most recent wnl 2/24/21and hallucinations do not change. She tells me she is having much difficulty sleeping due to \"parties and Hullabaloo outside my window\". Nursing staff is concerned as mentation waxes and wanes and she is able to be calmed down, but truly believes the things she sees. Past Medical History:   Diagnosis Date    Benign essential HTN 11/17/2019    Constipation 7/26/2016    Detrusor instability 11/17/2019    Dyslipidemia 11/17/2019    Heart valve disease 11/17/2019    Longstanding persistent atrial fibrillation 11/17/2019    Primary osteoarthritis involving multiple joints 11/17/2019     No past surgical history on file. Social History     Socioeconomic History    Marital status:       Spouse name: Not on file    Number of children: Not on file    Years of education: Not on file    Highest education level: Not on file   Occupational History    Not on file   Social Needs    Financial resource strain: Not on file    Food insecurity     Worry: Not on file     Inability: Not on file    Transportation needs     Medical: Not on file     Non-medical: Not on file   Tobacco Use    Smoking status: Not on file   Substance and Sexual Activity    Alcohol use: Not on file    Drug use: Not on file    Sexual activity: Not on file   Lifestyle    Physical activity     Days per week: Not on file     Minutes per session: Not on file    Stress: Not on file   Relationships    Social connections     Talks on phone: Not on file     Gets together: Not on file     Attends Confucianist service: Not on file     Active member of club or organization: Not on file     Attends meetings of clubs or organizations: Not on file     Relationship status: Not on file    Intimate partner violence     Fear of current or ex partner: Not on file     Emotionally abused: Not on file     Physically abused: Not on file     Forced sexual activity: Not on file   Other Topics Concern    Not on file   Social History Narrative    Not on file     No family history on file.   Allergies   Allergen Reactions    Pcn [Penicillins]     Vesicare [Solifenacin]      Current Outpatient Medications on File Prior to Visit   Medication Sig Dispense Refill    guaiFENesin (ROBITUSSIN) 100 MG/5ML syrup Take 200 mg by mouth 4 times daily as needed for Cough      aspirin 325 MG tablet Take 325 mg by mouth daily      propranolol (INDERAL) 20 MG tablet Take 20 mg by mouth nightly      donepezil (ARICEPT) 5 MG tablet Take 5 mg by mouth nightly      escitalopram (LEXAPRO) 10 MG tablet Take 10 mg by mouth daily      acetaminophen (TYLENOL) 500 MG tablet Take 500 mg by mouth 2 times daily      diltiazem (DILACOR XR) 240 MG extended release capsule Take 240 mg by mouth daily      docusate sodium (COLACE) 100 MG capsule Take 100 mg by mouth 2 times daily      hydrochlorothiazide (HYDRODIURIL) 12.5 MG tablet Take 12.5 mg by mouth daily      meclizine (ANTIVERT) 25 MG tablet Take 25 mg by mouth 2 times daily       potassium chloride (KLOR-CON M) 10 MEQ extended release tablet Take 10 mEq by mouth daily      diclofenac sodium (VOLTAREN) 1 % GEL Apply 2 g topically 4 times daily To affected shoulders 240 g 5    Cholecalciferol (VITAMIN D) 2000 units CAPS capsule Take 2,000 Units by mouth daily       No current facility-administered medications on file prior to visit. Review of Systems   Constitutional: Negative for activity change, appetite change, chills, diaphoresis, fatigue, fever and unexpected weight change. HENT: Negative for drooling, hearing loss, mouth sores, sore throat, trouble swallowing and voice change. Eyes: Negative for discharge and visual disturbance. Respiratory: Negative for apnea, cough, choking, chest tightness, shortness of breath, wheezing and stridor. Cardiovascular: Negative for chest pain, palpitations and leg swelling. Gastrointestinal: Negative for abdominal distention, abdominal pain, anal bleeding, blood in stool, constipation, diarrhea, nausea, rectal pain and vomiting. Genitourinary: Negative for difficulty urinating, dysuria, enuresis, frequency and hematuria. Musculoskeletal: Positive for arthralgias. Negative for back pain, gait problem, joint swelling and myalgias. Skin: Negative for color change, pallor, rash and wound. Allergic/Immunologic: Negative for food allergies and immunocompromised state. Neurological: Positive for dizziness. Negative for tremors, seizures, syncope, facial asymmetry, speech difficulty, weakness, light-headedness, numbness and headaches. Hematological: Negative for adenopathy. Does not bruise/bleed easily. Psychiatric/Behavioral: Positive for behavioral problems and confusion. Negative for agitation, decreased concentration, dysphoric mood, hallucinations, self-injury, sleep disturbance and suicidal ideas. The patient is nervous/anxious. The patient is not hyperactive. Objective: There were no vitals taken for this visit.     Physical Exam  Constitutional:       General: She is not in acute distress. Appearance: She is well-developed. She is not diaphoretic. HENT:      Head: Normocephalic and atraumatic. Right Ear: External ear normal.      Left Ear: External ear normal.      Nose: Nose normal.      Mouth/Throat:      Pharynx: No oropharyngeal exudate. Eyes:      General: No scleral icterus. Right eye: No discharge. Left eye: No discharge. Conjunctiva/sclera: Conjunctivae normal.      Pupils: Pupils are equal, round, and reactive to light. Neck:      Musculoskeletal: Normal range of motion and neck supple. Thyroid: No thyromegaly. Vascular: No JVD. Trachea: No tracheal deviation. Cardiovascular:      Rate and Rhythm: Normal rate and regular rhythm. Heart sounds: Normal heart sounds. Pulmonary:      Effort: Pulmonary effort is normal. No respiratory distress. Breath sounds: No stridor. Decreased breath sounds present. No wheezing or rales. Chest:      Chest wall: No tenderness. Abdominal:      General: Bowel sounds are normal. There is no distension. Palpations: Abdomen is soft. There is no mass. Tenderness: There is no abdominal tenderness. There is no guarding or rebound. Musculoskeletal: Normal range of motion. General: No tenderness or deformity. Right lower le+ Edema present. Left lower le+ Edema present. Lymphadenopathy:      Cervical: No cervical adenopathy. Skin:     General: Skin is warm and dry. Capillary Refill: Capillary refill takes less than 2 seconds. Findings: No erythema or rash. Neurological:      Mental Status: She is alert. She is disoriented. Comments: Disorientation waxes and wanes   Psychiatric:         Attention and Perception: Attention normal. She perceives visual hallucinations. Mood and Affect: Mood normal.         Speech: Speech normal.         Behavior: Behavior normal.         Thought Content:  Thought content normal.           Assessment: Diagnosis Orders   1. Visual hallucinations  QUEtiapine (SEROQUEL) 25 MG tablet    Urinalysis Reflex to Culture   2. Alzheimer's dementia with behavioral disturbance, unspecified timing of dementia onset (HCC)  QUEtiapine (SEROQUEL) 25 MG tablet   3. Anxiety  QUEtiapine (SEROQUEL) 25 MG tablet   4. 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 We discussed it is good safety practice to alert staff when she sees strange people. We discussed it  May be hallucinations due to dementia.   - Much emotional support and active listening    JESSENIA Rios - CNP

## 2021-03-03 ENCOUNTER — OFFICE VISIT (OUTPATIENT)
Dept: GERIATRIC MEDICINE | Age: 86
End: 2021-03-03
Payer: MEDICARE

## 2021-03-03 VITALS
DIASTOLIC BLOOD PRESSURE: 70 MMHG | BODY MASS INDEX: 26.19 KG/M2 | WEIGHT: 157.4 LBS | RESPIRATION RATE: 18 BRPM | HEART RATE: 60 BPM | SYSTOLIC BLOOD PRESSURE: 140 MMHG

## 2021-03-03 DIAGNOSIS — Z71.89 ACP (ADVANCE CARE PLANNING): ICD-10-CM

## 2021-03-03 DIAGNOSIS — Z00.00 MEDICARE ANNUAL WELLNESS VISIT, SUBSEQUENT: Primary | ICD-10-CM

## 2021-03-03 DIAGNOSIS — I48.20 CHRONIC ATRIAL FIBRILLATION (HCC): ICD-10-CM

## 2021-03-03 PROCEDURE — G0439 PPPS, SUBSEQ VISIT: HCPCS | Performed by: NURSE PRACTITIONER

## 2021-03-03 PROCEDURE — 99497 ADVNCD CARE PLAN 30 MIN: CPT | Performed by: NURSE PRACTITIONER

## 2021-03-03 ASSESSMENT — PATIENT HEALTH QUESTIONNAIRE - PHQ9
SUM OF ALL RESPONSES TO PHQ9 QUESTIONS 1 & 2: 0
1. LITTLE INTEREST OR PLEASURE IN DOING THINGS: 0
2. FEELING DOWN, DEPRESSED OR HOPELESS: 0
SUM OF ALL RESPONSES TO PHQ QUESTIONS 1-9: 0

## 2021-03-03 NOTE — PROGRESS NOTES
[facility]  [address] [place] [address]  3/3/2021    Medicare Annual Wellness Visit  Name: Reinaldo Malik CTNFTM Date: 3/3/2021   MRN: [id number] Sex: Female   Age: 80 y.o. Ethnicity: Non-/Non    : 1926 Race: Elli Malik is here for Medicare AWV    Screenings for behavioral, psychosocial and functional/safety risks, and cognitive dysfunction are all negative except as indicated below. These results, as well as other patient data from the [address] form, are documented in Flowsheets linked to this Encounter. Allergies   Allergen Reactions    Pcn [Penicillins]     Vesicare [Solifenacin]        Prior to Visit Medications    Medication Sig Taking?  Authorizing Provider   QUEtiapine (SEROQUEL) 25 MG tablet Take 0.5 tablets by mouth nightly  uYe Acosta APRN - CNP   guaiFENesin (ROBITUSSIN) 100 MG/5ML syrup Take 200 mg by mouth 4 times daily as needed for Cough  Historical Provider, MD   aspirin 325 MG tablet Take 325 mg by mouth daily  Historical Provider, MD   propranolol (INDERAL) 20 MG tablet Take 20 mg by mouth nightly  Historical Provider, MD   donepezil (ARICEPT) 5 MG tablet Take 5 mg by mouth nightly  Historical Provider, MD   escitalopram (LEXAPRO) 10 MG tablet Take 10 mg by mouth daily  Historical Provider, MD   diclofenac sodium (VOLTAREN) 1 % GEL Apply 2 g topically 4 times daily To affected shoulders  JESSENIA Chavira CNP   acetaminophen (TYLENOL) 500 MG tablet Take 500 mg by mouth 2 times daily  Historical Provider, MD   diltiazem (DILACOR XR) 240 MG extended release capsule Take 240 mg by mouth daily  Historical Provider, MD   docusate sodium (COLACE) 100 MG capsule Take 100 mg by mouth 2 times daily  Historical Provider, MD   hydrochlorothiazide (HYDRODIURIL) 12.5 MG tablet Take 12.5 mg by mouth daily  Historical Provider, MD   meclizine (ANTIVERT) 25 MG tablet Take 25 mg by mouth 2 times daily   Historical Provider, MD   potassium chloride (KLOR-CON M) 10 MEQ extended release tablet Take 10 mEq by mouth daily  Historical Provider, MD   Cholecalciferol (VITAMIN D) 2000 units CAPS capsule Take 2,000 Units by mouth daily  Historical Provider, MD       Past Medical History:   Diagnosis Date    Benign essential HTN 11/17/2019    Constipation 7/26/2016    Detrusor instability 11/17/2019    Dyslipidemia 11/17/2019    Heart valve disease 11/17/2019    Longstanding persistent atrial fibrillation 11/17/2019    Primary osteoarthritis involving multiple joints 11/17/2019       No past surgical history on file. No family history on file. CareTeam (Including outside providers/suppliers regularly involved in providing care):   Patient Care Team:  Amilcar Amato MD as PCP - General (Internal Medicine)  Amilcar Amato MD as PCP - [facility] Empaneled Provider  JESSENIA Ramirez - CNP as Advanced Practice Nurse (Palliative Medicine)    Wt Readings from Last 3 Encounters:   03/03/21 157 lb 6.4 oz (71.4 kg)   11/04/20 157 lb (71.2 kg)   10/14/20 155 lb (70.3 kg)     Vitals:    03/03/21 1644   BP: (!) 140/70   Pulse: 60   Resp: 18   Weight: 157 lb 6.4 oz (71.4 kg)     Body mass index is 26.19 kg/m². Based upon direct observation of the patient, evaluation of cognition reveals has vascular dementia. Patient's complete Health Risk Assessment and screening values have been reviewed and are found in Flowsheets. The following problems were reviewed today and where indicated follow up appointments were made and/or referrals ordered. Positive Risk Factor Screenings with Interventions:     Fall Risk:  Timed Up and Go Test > 12 seconds?  (Complete if either Fall Risk answers are Yes): (!) yes  2 or more falls in past year?: (!) yes  Fall with injury in past year?: no  Fall Risk Interventions:    · Patient declines any further evaluation/treatment for this issue  · nursing placed in wheel chair for long distance, has maxed out P TOT     Cognitive: Words recalled: 0 Words Recalled  Clock Drawing Test (CDT) Score: (refused)  Total Score Interpretation: Positive Mini-Cog  Did the patient refuse to take the cognition test?: No(did not pass sord recall and not do clock drawing)  Cognitive Impairment Interventions:  · Patient declines any further evaluation/treatment for cognitive impairment       General Health and ACP:  General  In general, how would you say your health is?: Good  In the past 7 days, have you experienced any of the following?  New or Increased Pain, New or Increased Fatigue, Loneliness, Social Isolation, Stress or Anger?: None of These  Do you get the social and emotional support that you need?: Yes  Do you have a Living Will?: Yes  Advance Directives     Power of  Living Will ACP-Advance Directive ACP-Power of Nay Solders on 03/03/21 Filed on 03/03/21 Ajay Hernández      General Health Risk Interventions:  · Social isolation: had covid isolation   · is allowed out for visits as of this week     Health Habits/Nutrition:  Health Habits/Nutrition  Do you exercise for at least 20 minutes 2-3 times per week?: (!) No  Have you lost any weight without trying in the past 3 months?: No  Do you eat only one meal per day?: No  Have you seen the dentist within the past year?: (!) No     Health Habits/Nutrition Interventions:  · Inadequate physical activity:  not interesteed in exercise pt report s  · Dental exam overdue:  patient encouraged to make appointment with his/her dentist    Hearing/Vision:  No exam data present  Hearing/Vision  Do you or your family notice any trouble with your hearing that hasn't been managed with hearing aids?: No  Do you have difficulty driving, watching TV, or doing any of your daily activities because of your eyesight?: No  Have you had an eye exam within the past year?: (!) No  Hearing/Vision Interventions:  · will make appts as able this year ow out of isolation      ADL:  ADLs  In the past 7 days, did you need help from others to perform any of the following everyday activities? Eating, dressing, grooming, bathing, toileting, or walking/balance?: (!) Dressing, Bathing  In the past 7 days, did you need help from others to take care of any of the following? Laundry, housekeeping, banking/finances, shopping, telephone use, food preparation, transportation, or taking medications?: [employer], Housekeeping, Taking Medications, Banking/Finances, Shopping, Food Preparation, Transportation  ADL Interventions:  · Patient declines any further evaluation/treatment for this issue    Personalized Preventive Plan   Current Health Maintenance Status    There is no immunization history on file for this patient. Health Maintenance   Topic Date Due    COVID-19 Vaccine (1 of 2) Never done    DTaP/Tdap/Td vaccine (1 - Tdap) Never done    Shingles Vaccine (1 of 2) Never done    Pneumococcal 65+ years Vaccine (1 of 1 - PPSV23) Never done   [employer] Visit (AWV)  Never done    Flu vaccine (1) Never done    Potassium monitoring  01/14/2022    Creatinine monitoring  01/14/2022    Hepatitis A vaccine  Aged Out    Hepatitis B vaccine  Aged Out    Hib vaccine  Aged Out    Meningococcal (ACWY) vaccine  Aged Out     Recommendations for [place] Due: see orders and patient instructions/AVS.  . Recommended screening schedule for the next 5-10 years is provided to the patient in written form: see Patient Radah Last was seen today for medicare awv.     Diagnoses and all orders for this visit:    Medicare annual wellness visit, subsequent    ACP (advance care planning)  -     [address], [address] [[id number]]    Other orders  -     DNR comfort care                   Advance Care Planning   Advanced Care Planning: Discussed the patients choices for care and treatment in case of a health event that adversely affects

## 2021-03-05 ENCOUNTER — TELEPHONE (OUTPATIENT)
Dept: PALLATIVE CARE | Age: 86
End: 2021-03-05

## 2021-03-08 ENCOUNTER — TELEPHONE (OUTPATIENT)
Dept: MEDSURG UNIT | Age: 86
End: 2021-03-08

## 2021-03-08 RX ORDER — DOXYCYCLINE HYCLATE 100 MG/1
100 CAPSULE ORAL 2 TIMES DAILY
Qty: 20 CAPSULE | Refills: 0 | Status: SHIPPED | OUTPATIENT
Start: 2021-03-08 | End: 2021-03-18

## 2021-03-08 NOTE — TELEPHONE ENCOUNTER
Spoke with Nurse at [facility] BE[facility] SANTIAGO, since initiating Seroquel at [facility], Sakshi Mcdowell has had less hallucinations and her mood has I improved, She seems to be sleeping better. They do not feel she is excessively sedated. He UA has trace leukocytes, I called out Doxy.

## 2021-03-17 ENCOUNTER — TELEPHONE (OUTPATIENT)
Dept: PALLATIVE CARE | Age: 86
End: 2021-03-17

## 2021-03-17 NOTE — TELEPHONE ENCOUNTER
Denver Muñoz sent a Fax in regards to patient stating \"resident stated she lost her balance and fell backward. May have stepped on the side of her slipper when she lost her balance. She also stated she hit her head on the bed no injuries noted to this site.  Vitals as follow 154/60, 96.4, pulse 66, resp 18\"  Please advise

## 2021-03-23 PROBLEM — F03.918 BEHAVIORAL AND PSYCHOLOGICAL SYMPTOMS OF DEMENTIA: Status: ACTIVE | Noted: 2021-03-23

## 2021-03-23 PROBLEM — F02.818 LATE ONSET ALZHEIMER'S DISEASE WITH BEHAVIORAL DISTURBANCE (HCC): Status: ACTIVE | Noted: 2020-08-30
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
## 2021-03-24 ENCOUNTER — OFFICE VISIT (OUTPATIENT)
Dept: GERIATRIC MEDICINE | Age: 86
End: 2021-03-24
Payer: MEDICARE

## 2021-03-24 DIAGNOSIS — F03.918 BEHAVIORAL AND PSYCHOLOGICAL SYMPTOMS OF DEMENTIA: ICD-10-CM

## 2021-03-24 DIAGNOSIS — R27.0 ATAXIA: ICD-10-CM

## 2021-03-24 DIAGNOSIS — N32.81 DETRUSOR INSTABILITY: ICD-10-CM

## 2021-03-24 DIAGNOSIS — H81.10 BENIGN PAROXYSMAL POSITIONAL VERTIGO, UNSPECIFIED LATERALITY: Primary | ICD-10-CM

## 2021-04-12 ENCOUNTER — OFFICE VISIT (OUTPATIENT)
Dept: PALLATIVE CARE | Age: 86
End: 2021-04-12
Payer: MEDICARE

## 2021-04-12 VITALS — HEART RATE: 66 BPM | DIASTOLIC BLOOD PRESSURE: 80 MMHG | OXYGEN SATURATION: 100 % | SYSTOLIC BLOOD PRESSURE: 130 MMHG

## 2021-04-12 DIAGNOSIS — Z51.5 PALLIATIVE CARE ENCOUNTER: ICD-10-CM

## 2021-04-12 DIAGNOSIS — M19.90 CHRONIC OSTEOARTHRITIS: Primary | ICD-10-CM

## 2021-04-12 DIAGNOSIS — F02.818 LATE ONSET ALZHEIMER'S DISEASE WITH BEHAVIORAL DISTURBANCE (HCC): ICD-10-CM

## 2021-04-12 DIAGNOSIS — G30.1 LATE ONSET ALZHEIMER'S DISEASE WITH BEHAVIORAL DISTURBANCE (HCC): ICD-10-CM

## 2021-04-12 DIAGNOSIS — M25.511 CHRONIC PAIN OF BOTH SHOULDERS: ICD-10-CM

## 2021-04-12 DIAGNOSIS — R44.3 HALLUCINATIONS: ICD-10-CM

## 2021-04-12 DIAGNOSIS — G89.29 CHRONIC PAIN OF BOTH SHOULDERS: ICD-10-CM

## 2021-04-12 DIAGNOSIS — M25.512 CHRONIC PAIN OF BOTH SHOULDERS: ICD-10-CM

## 2021-04-12 RX ORDER — QUETIAPINE FUMARATE 25 MG/1
12.5 TABLET, FILM COATED ORAL 2 TIMES DAILY
COMMUNITY

## 2021-04-12 ASSESSMENT — ENCOUNTER SYMPTOMS
SHORTNESS OF BREATH: 0
TROUBLE SWALLOWING: 0
RECTAL PAIN: 0
ANAL BLEEDING: 0
BACK PAIN: 0
NAUSEA: 0
EYE DISCHARGE: 0
COUGH: 0
SORE THROAT: 0
ABDOMINAL DISTENTION: 0
CONSTIPATION: 0
BLOOD IN STOOL: 0
VOICE CHANGE: 0
DIARRHEA: 0
APNEA: 0
ABDOMINAL PAIN: 0
VOMITING: 0
CHOKING: 0
STRIDOR: 0
CHEST TIGHTNESS: 0
WHEEZING: 0
COLOR CHANGE: 0

## 2021-04-12 NOTE — PROGRESS NOTES
[employer] of [address] [facility], [address] [facility]    Subjective:      Patient Id:  Cindy Ramos is a 80 y.o. female who presents today with   Chief Complaint   Patient presents with    Follow-up       HPI   Seen at [facility] DR. BECCA SANTIAGO for symptom management follow up rt behavorial and psychological symptoms of dementia, Late onset Alzheimer's disease, and osteoarthritis. Her demeanor is calm and relaxed, NAD, no sedation. Seen in the assisted living setting due to disease related symptoms and debility create heavy physical and financial burden to get to a clinic setting. Patient complains of hallucinations but are improved from last visit. Seroquel has recently been changed to 12.5 mg BID. Pain in her shoulders has improved and she is not currently having any other pain. Negative headaches, dizziness, or vision changes. No dysphagia, dysgeusia, or mouth sores; weight stable, Appetite is good. No GI or  concerns. No nik blood in stool or urine. SOB and edema at baseline. Negative excessive fatigue, fever, chills, myalgias, increased sputum production or cough, nausea, vomiting, chest pain, or orthopnea. Negative significant Sleep disturbance, depression, anxiety, or agitation episodes; denies suicidal or homicidal ideation or signs suggesting existential grief or spiritual pain. Past Medical History:   Diagnosis Date    Benign essential HTN 11/17/2019    Constipation 7/26/2016    Detrusor instability 11/17/2019    Dyslipidemia 11/17/2019    Heart valve disease 11/17/2019    Longstanding persistent atrial fibrillation ([facility] [address] 11/17/2019    Primary osteoarthritis involving multiple joints 11/17/2019     No past surgical history on file. Social History     Socioeconomic History    Marital status:       Spouse name: Not on file    Number of children: Not on file    Years of education: Not on file    Highest education level: Not on file   Occupational History    Not on file   Social Needs    Financial resource strain: Not on file    Food insecurity     Worry: Not on file     Inability: Not on file    Transportation needs     Medical: Not on file     Non-medical: Not on file   Tobacco Use    Smoking status: Never Smoker    Smokeless tobacco: Never Used   Substance and Sexual Activity    Alcohol use: Not on file    Drug use: Not on file    Sexual activity: Not on file   Lifestyle    Physical activity     Days per week: Not on file     Minutes per session: Not on file    Stress: Not on file   Relationships    Social connections     Talks on phone: Not on file     Gets together: Not on file     Attends Zoroastrian service: Not on file     Active member of club or organization: Not on file     Attends meetings of clubs or organizations: Not on file     Relationship status: Not on file    Intimate partner violence     Fear of current or ex partner: Not on file     Emotionally abused: Not on file     Physically abused: Not on file     Forced sexual activity: Not on file   Other Topics Concern    Not on file   Social History Narrative    Not on file     No family history on file.   Allergies   Allergen Reactions    Pcn [Penicillins]     Vesicare [Solifenacin]      Current Outpatient Medications on File Prior to Visit   Medication Sig Dispense Refill    QUEtiapine (SEROQUEL) 25 MG tablet Take 12.5 mg by mouth 2 times daily      guaiFENesin (ROBITUSSIN) 100 MG/5ML syrup Take 200 mg by mouth 4 times daily as needed for Cough      aspirin 325 MG tablet Take 325 mg by mouth daily      propranolol (INDERAL) 20 MG tablet Take 20 mg by mouth nightly      donepezil (ARICEPT) 5 MG tablet Take 5 mg by mouth nightly      escitalopram (LEXAPRO) 10 MG tablet Take 10 mg by mouth daily      diclofenac sodium (VOLTAREN) 1 % GEL Apply 2 g topically 4 times daily To affected shoulders 240 g 5    acetaminophen (TYLENOL) 500 MG tablet Take 500 mg by mouth 2 times daily      diltiazem (DILACOR XR) 240 MG extended release capsule Take 240 mg by mouth daily      docusate sodium (COLACE) 100 MG capsule Take 100 mg by mouth 2 times daily      hydrochlorothiazide (HYDRODIURIL) 12.5 MG tablet Take 12.5 mg by mouth daily      meclizine (ANTIVERT) 25 MG tablet Take 25 mg by mouth 2 times daily       potassium chloride (KLOR-CON M) 10 MEQ extended release tablet Take 10 mEq by mouth daily      Cholecalciferol (VITAMIN D) 2000 units CAPS capsule Take 2,000 Units by mouth daily       No current facility-administered medications on file prior to visit. Review of Systems   Constitutional: Negative for activity change, appetite change, chills, diaphoresis, fatigue, fever and unexpected weight change. HENT: Negative for drooling, hearing loss, mouth sores, sore throat, trouble swallowing and voice change. Eyes: Negative for discharge and visual disturbance. Respiratory: Negative for apnea, cough, choking, chest tightness, shortness of breath, wheezing and stridor. Cardiovascular: Negative for chest pain, palpitations and leg swelling. Gastrointestinal: Negative for abdominal distention, abdominal pain, anal bleeding, blood in stool, constipation, diarrhea, nausea, rectal pain and vomiting. Genitourinary: Negative for difficulty urinating, dysuria, enuresis, frequency and hematuria. Musculoskeletal: Positive for arthralgias. Negative for back pain, gait problem, joint swelling and myalgias. Skin: Negative for color change, pallor, rash and wound. Allergic/Immunologic: Negative for food allergies and immunocompromised state. Neurological: Negative for dizziness, tremors, seizures, syncope, facial asymmetry, speech difficulty, weakness, light-headedness, numbness and headaches. Hematological: Negative for adenopathy. Does not bruise/bleed easily. Psychiatric/Behavioral: Positive for hallucinations.  Negative for agitation, behavioral problems, confusion, decreased concentration, dysphoric mood, self-injury, sleep disturbance and suicidal ideas. The patient is not nervous/anxious and is not hyperactive. Objective:   /80 (Site: Left Upper Arm)   Pulse 66   SpO2 100%     Physical Exam  Constitutional:       General: She is not in acute distress. Appearance: She is well-developed. She is not diaphoretic. HENT:      Head: Normocephalic and atraumatic. Right Ear: External ear normal.      Left Ear: External ear normal.      Nose: Nose normal.      Mouth/Throat:      Pharynx: No oropharyngeal exudate. Eyes:      General: No scleral icterus. Right eye: No discharge. Left eye: No discharge. Conjunctiva/sclera: Conjunctivae normal.      Pupils: Pupils are equal, round, and reactive to light. Neck:      Musculoskeletal: Normal range of motion and neck supple. Thyroid: No thyromegaly. Vascular: No JVD. Trachea: No tracheal deviation. Cardiovascular:      Rate and Rhythm: Normal rate and regular rhythm. Heart sounds: Murmur present. Pulmonary:      Effort: Pulmonary effort is normal. No respiratory distress. Breath sounds: Normal breath sounds. No stridor. No wheezing or rales. Chest:      Chest wall: No tenderness. Abdominal:      General: Bowel sounds are normal. There is no distension. Palpations: Abdomen is soft. There is no mass. Tenderness: There is no abdominal tenderness. There is no guarding or rebound. Musculoskeletal: Normal range of motion. General: No tenderness or deformity. Lymphadenopathy:      Cervical: No cervical adenopathy. Skin:     General: Skin is warm and dry. Findings: No erythema or rash. Neurological:      Mental Status: She is alert. Mental status is at baseline. Psychiatric:         Attention and Perception: Attention and perception normal.         Speech: Speech normal.         Behavior: Behavior normal.         Thought Content:  Thought content normal. Assessment:       Diagnosis Orders   1. Chronic osteoarthritis     2. Chronic pain of both shoulders     3. Late onset Alzheimer's disease with behavioral disturbance (HCC)     4. Hallucinations     5. Palliative care encounter            Plan:    -Dementia with behavioral disturbance  Monitor for signs and symptoms of delirium as due to advanced age, history of cognitive difficulty, acute illness, and multiple comorbidities patient is high risk. Offer continuous behavior redirection and orientation, avoid delirium inducing medication, expose to natural sunlight as much as possible, calm surroundings as much as possible. Continue seroquel 12.5 mg BID, aricept, and lexapro. Multi-joint pain related to immobility and OA  - Contine current POC  - Heat or ice to painful areas, whichever is preferred  - Gentle ROM exercises    No orders of the defined types were placed in this encounter. No orders of the defined types were placed in this encounter. Return in about 4 weeks (around 5/10/2021).     Sin Londono, JESSENIA - CNP

## 2021-04-18 NOTE — PROGRESS NOTES
[facility]  [address] [place] [address]      3/24/2021    Lucía Malik  is a 80 y.o. in the NF being seen for a f/u of   Chief Complaint   Patient presents with    3 Month Follow-Up     ataxia       Spoke to nursing and ancillary staff regarding patient status, present state of health. Need for nursing care and assistance. Reviewed chart, labs, medications in NF chart and allergies. Aging in place at this time. HPI lives in  assisted living. Being seen for chronic illnesses, hallucinations. Having more hallucinations, has dementia, has been on dementia meds with no improvement and continues to worsen. Having dreams also which may be from side effects of  meds  Will stop Aricept and Lexapro to see if we can reduce some fo these sympptoms which may be from side effects of medications. Reports from nurse, pt is at baseline, no change in vision, hearing. No headaches or choking issues. U/A was negative fpr UTI   Uses wheel chair more with less walker use    for locomotion in facility. No decrease in appetite, no change in B/B habits. No pain crises. NO fevers. Can make needs known to nurse. Past Medical History:   Diagnosis Date    Benign essential HTN 11/17/2019    Constipation 7/26/2016    Detrusor instability 11/17/2019    Dyslipidemia 11/17/2019    Heart valve disease 11/17/2019    Longstanding persistent atrial fibrillation ([facility] [address] 11/17/2019    Primary osteoarthritis involving multiple joints 11/17/2019     No past surgical history on file. No family history on file. Social History     Socioeconomic History    Marital status:       Spouse name: Not on file    Number of children: Not on file    Years of education: Not on file    Highest education level: Not on file   Occupational History    Not on file   Social Needs    Financial resource strain: Not on file    Food insecurity     Worry: Not on file     Inability: Not on file    Transportation needs     Medical: Not on file     Non-medical: Not on file   Tobacco Use    Smoking status: Never Smoker    Smokeless tobacco: Never Used   Substance and Sexual Activity    Alcohol use: Not on file    Drug use: Not on file    Sexual activity: Not on file   Lifestyle    Physical activity     Days per week: Not on file     Minutes per session: Not on file    Stress: Not on file   Relationships    Social connections     Talks on phone: Not on file     Gets together: Not on file     Attends Jehovah's witness service: Not on file     Active member of club or organization: Not on file     Attends meetings of clubs or organizations: Not on file     Relationship status: Not on file    Intimate partner violence     Fear of current or ex partner: Not on file     Emotionally abused: Not on file     Physically abused: Not on file     Forced sexual activity: Not on file   Other Topics Concern    Not on file   Social History Narrative    Not on file       Allergies: Pcn [penicillins] and Vesicare [solifenacin]  NF MEDICATIONS REVIEWED    ROS:  See HPI  Constitutional: There are reports of behavioral issues, no change in appetite, fever, or weakness. No gross bleeding issues. Some  difficulty ambulating to meals or activities. C/o dizziness ocassionally  Respiratory: denies SOB, dyspnea,   Cardiovascular: denies CP, lightheadedness,   GI: No reports of change of bowel habits, no N/V/D/C. : no reports of change in bladder habits  Extremities: No reports of uncontrolled pain issues, no  chronic edema    Physical exam:   BP (!) 128/55   Pulse 70   Temp 96 °F (35.6 °C)   Resp 18   Wt 155 lb (70.3 kg)   BMI 25.79 kg/m²   Constitutional: Awake and alert sitting up in chair,   HEENT: normocephalic, PEAR, MMM, no cyanosis. NO neck mass visualized , facial asymmetry intact.    Cardiovascular: Regular rate  Respiratory: LCTA, unlabored respirations  GI: abdomen ND  : no bladder tenderness  Extremities: no edema,  Psych: pleasant and able to follow simple commands,  speech clear    ASSESSMENT:     Diagnosis Orders   1. Benign paroxysmal positional vertigo, unspecified laterality     2. Behavioral and psychological symptoms of dementia ([address]     3. Detrusor instability     4. Ataxia         PLAN:   1. Continues with poor relief with antivert  2. Continues to have hallucinations, with no improvement, increase seroquel   3 incontinent  4. Falls, and high risk for continued falls. Return in about 3 months (around 6/24/2021). Pertinent POC, labs, have been reviewed, continue same. Encourage fluids and good nutrition. Stress fall prevention strategies.   Pt/POA agrees to [address], APRN-CNP      Manju Lyon, YE, MSN, RN, GNP-BC, NP-C  Adult/[facility] Nurse Practitioner  [address]  Department of Geriatrics  8:30am-4:30pm   [phone]  After hours Answering service [phone]

## 2021-04-22 VITALS
RESPIRATION RATE: 18 BRPM | TEMPERATURE: 96 F | SYSTOLIC BLOOD PRESSURE: 128 MMHG | HEART RATE: 70 BPM | WEIGHT: 155 LBS | BODY MASS INDEX: 25.79 KG/M2 | DIASTOLIC BLOOD PRESSURE: 55 MMHG

## 2021-06-02 ENCOUNTER — OFFICE VISIT (OUTPATIENT)
Dept: PALLATIVE CARE | Age: 86
End: 2021-06-02
Payer: MEDICARE

## 2021-06-02 VITALS
DIASTOLIC BLOOD PRESSURE: 70 MMHG | RESPIRATION RATE: 18 BRPM | OXYGEN SATURATION: 96 % | SYSTOLIC BLOOD PRESSURE: 124 MMHG | HEART RATE: 68 BPM

## 2021-06-02 DIAGNOSIS — M25.50 ARTHRALGIA, UNSPECIFIED JOINT: ICD-10-CM

## 2021-06-02 DIAGNOSIS — F03.91 DEMENTIA WITH BEHAVIORAL DISTURBANCE, UNSPECIFIED DEMENTIA TYPE: Primary | ICD-10-CM

## 2021-06-02 DIAGNOSIS — Z51.5 PALLIATIVE CARE ENCOUNTER: ICD-10-CM

## 2021-06-02 ASSESSMENT — ENCOUNTER SYMPTOMS
CHEST TIGHTNESS: 0
ABDOMINAL PAIN: 0
SHORTNESS OF BREATH: 0
NAUSEA: 0
EYE DISCHARGE: 0
ANAL BLEEDING: 0
COLOR CHANGE: 0
COUGH: 0
BLOOD IN STOOL: 0
SORE THROAT: 0
CHOKING: 0
CONSTIPATION: 0
VOICE CHANGE: 0
VOMITING: 0
RECTAL PAIN: 0
TROUBLE SWALLOWING: 0
BACK PAIN: 0
APNEA: 0
ABDOMINAL DISTENTION: 0
WHEEZING: 0
DIARRHEA: 0
STRIDOR: 0

## 2021-06-02 NOTE — PROGRESS NOTES
[employer] of [address] [facility], [address] [facility]    Subjective:      Patient Id:  Itzel Hackett is a 80 y.o. female who presents today with   No chief complaint on file. HPI     Seen at [facility] DR. BECCA SANTIAGO for symptom management follow up rt behavorial and psychological symptoms of Late onset Alzheimer's disease,OA, HTN, HLD, Vertigo, A fib, depression, and heart valve disease. Complains of hallucinations but staff feel they are improved from last visit. Seroquel has recently been changed to 12.5 mg BID. She is able to be redirected and calmed down when disoriented. Pain in her shoulders has improved and she is not currently having any other pain. Negative headaches, dizziness, or vision changes. No dysphagia, dysgeusia, or mouth sores; weight stable, Appetite is good. No GI or  concerns. No nik blood in stool or urine. SOB and edema at baseline. Negative excessive fatigue, fever, chills, myalgias, increased sputum production or cough, nausea, vomiting, chest pain, or orthopnea. Negative significant Sleep disturbance, depression, anxiety, or agitation episodes. Past Medical History:   Diagnosis Date    Benign essential HTN 11/17/2019    Constipation 7/26/2016    Detrusor instability 11/17/2019    Dyslipidemia 11/17/2019    Heart valve disease 11/17/2019    Longstanding persistent atrial fibrillation ([facility] [address] 11/17/2019    Primary osteoarthritis involving multiple joints 11/17/2019     No past surgical history on file. Social History     Socioeconomic History    Marital status:       Spouse name: Not on file    Number of children: Not on file    Years of education: Not on file    Highest education level: Not on file   Occupational History    Not on file   Tobacco Use    Smoking status: Never Smoker    Smokeless tobacco: Never Used   Substance and Sexual Activity    Alcohol use: Not on file    Drug use: Not on file    Sexual activity: Not on file   Other Topics Concern    Not on file   Social History Narrative    Not on file     Social Determinants of Health     Financial Resource Strain:     Difficulty of Paying Living Expenses:    Food Insecurity:     Worried About Running Out of Food in the Last Year:     [address] in the Last Year:    Transportation Needs:     Lack of Transportation (Medical):  Lack of Transportation (Non-Medical):    Physical Activity:     Days of Exercise per Week:     Minutes of Exercise per Session:    Stress:     Feeling of Stress :    Social Connections:     Frequency of Communication with Friends and Family:     Frequency of Social Gatherings with Friends and Family:     Attends Mormonism Services:     Active Member of Clubs or Organizations:     Attends Club or Organization Meetings:     Marital Status:    Intimate Partner Violence:     Fear of Current or Ex-Partner:     Emotionally Abused:     Physically Abused:     Sexually Abused:      No family history on file.   Allergies   Allergen Reactions    Pcn [Penicillins]     Vesicare [Solifenacin]      Current Outpatient Medications on File Prior to Visit   Medication Sig Dispense Refill    QUEtiapine (SEROQUEL) 25 MG tablet Take 12.5 mg by mouth 2 times daily      guaiFENesin (ROBITUSSIN) 100 MG/5ML syrup Take 200 mg by mouth 4 times daily as needed for Cough      aspirin 325 MG tablet Take 325 mg by mouth daily      propranolol (INDERAL) 20 MG tablet Take 20 mg by mouth nightly      donepezil (ARICEPT) 5 MG tablet Take 5 mg by mouth nightly      escitalopram (LEXAPRO) 10 MG tablet Take 10 mg by mouth daily      diclofenac sodium (VOLTAREN) 1 % GEL Apply 2 g topically 4 times daily To affected shoulders 240 g 5    acetaminophen (TYLENOL) 500 MG tablet Take 500 mg by mouth 2 times daily      diltiazem (DILACOR XR) 240 MG extended release capsule Take 240 mg by mouth daily      docusate sodium (COLACE) 100 MG capsule Take 100 mg by mouth 2 times daily

## 2021-06-16 ENCOUNTER — OFFICE VISIT (OUTPATIENT)
Dept: GERIATRIC MEDICINE | Age: 86
End: 2021-06-16
Payer: MEDICARE

## 2021-06-16 DIAGNOSIS — H81.10 BENIGN PAROXYSMAL POSITIONAL VERTIGO, UNSPECIFIED LATERALITY: ICD-10-CM

## 2021-06-16 DIAGNOSIS — N32.81 DETRUSOR INSTABILITY: Primary | ICD-10-CM

## 2021-07-01 ENCOUNTER — OFFICE VISIT (OUTPATIENT)
Dept: PALLATIVE CARE | Age: 86
End: 2021-07-01
Payer: MEDICARE

## 2021-07-01 VITALS
SYSTOLIC BLOOD PRESSURE: 110 MMHG | HEART RATE: 65 BPM | RESPIRATION RATE: 18 BRPM | OXYGEN SATURATION: 96 % | DIASTOLIC BLOOD PRESSURE: 74 MMHG

## 2021-07-01 DIAGNOSIS — M25.50 ARTHRALGIA, UNSPECIFIED JOINT: ICD-10-CM

## 2021-07-01 DIAGNOSIS — S61.411A SKIN TEAR OF RIGHT HAND WITHOUT COMPLICATION, INITIAL ENCOUNTER: ICD-10-CM

## 2021-07-01 DIAGNOSIS — Z51.5 PALLIATIVE CARE ENCOUNTER: Primary | ICD-10-CM

## 2021-07-01 DIAGNOSIS — F03.91 DEMENTIA WITH BEHAVIORAL DISTURBANCE, UNSPECIFIED DEMENTIA TYPE: ICD-10-CM

## 2021-07-01 ASSESSMENT — ENCOUNTER SYMPTOMS
BLOOD IN STOOL: 0
ABDOMINAL DISTENTION: 0
EYE DISCHARGE: 0
VOMITING: 0
STRIDOR: 0
ANAL BLEEDING: 0
ABDOMINAL PAIN: 0
CHEST TIGHTNESS: 0
TROUBLE SWALLOWING: 0
SORE THROAT: 0
CONSTIPATION: 0
BACK PAIN: 0
COLOR CHANGE: 0
COUGH: 0
DIARRHEA: 0
WHEEZING: 0
VOICE CHANGE: 0
CHOKING: 0
NAUSEA: 0
APNEA: 0
RECTAL PAIN: 0
SHORTNESS OF BREATH: 0

## 2021-07-01 NOTE — PROGRESS NOTES
[employer] of [address] [facility], [address] [facility]    Subjective:      Patient Id:  Med Russo is a 80 y.o. female who presents today with   Chief Complaint   Patient presents with    Pain       HPI   Seen at HOSP DR. BECCA SANTIAGO for symptom management follow up rt behavorial and psychological symptoms of Late onset Alzheimer's disease,OA, HTN, HLD, Vertigo, A fib, depression, and heart valve disease. Notified by nursing staff she had a mechanical fall last night while attempting to use the restroom on her own. Neuro checks have been WNL. No complaints of headache, dizziness, vision changes, nausea, or vomiting. Skin tear noted on dorsal side of right hand. Complains of hallucinations but staff feel they are improved from last visit. Seroquel has recently been changed to 12.5 mg BID. She is able to be redirected and calmed down when disoriented. Pain in her shoulders has improved and she is not currently having any other pain. No dysphagia, dysgeusia, or mouth sores; weight stable, Appetite is good. No GI or  concerns. No nik blood in stool or urine. SOB and edema at baseline. Negative excessive fatigue, fever, chills, myalgias, increased sputum production or cough, nausea, vomiting, chest pain, or orthopnea. Negative significant Sleep disturbance, depression, anxiety, or agitation episodes. Past Medical History:   Diagnosis Date    Benign essential HTN 11/17/2019    Constipation 7/26/2016    Detrusor instability 11/17/2019    Dyslipidemia 11/17/2019    Heart valve disease 11/17/2019    Longstanding persistent atrial fibrillation ([facility] [address] 11/17/2019    Primary osteoarthritis involving multiple joints 11/17/2019     No past surgical history on file. Social History     Socioeconomic History    Marital status:       Spouse name: Not on file    Number of children: Not on file    Years of education: Not on file    Highest education level: Not on file   Occupational History    Not on file   Tobacco Use    Smoking status: Never Smoker    Smokeless tobacco: Never Used   Substance and Sexual Activity    Alcohol use: Not on file    Drug use: Not on file    Sexual activity: Not on file   Other Topics Concern    Not on file   Social History Narrative    Not on file     Social Determinants of Health     Financial Resource Strain:     Difficulty of Paying Living Expenses:    Food Insecurity:     Worried About Running Out of Food in the Last Year:     [address] in the Last Year:    Transportation Needs:     Lack of Transportation (Medical):  Lack of Transportation (Non-Medical):    Physical Activity:     Days of Exercise per Week:     Minutes of Exercise per Session:    Stress:     Feeling of Stress :    Social Connections:     Frequency of Communication with Friends and Family:     Frequency of Social Gatherings with Friends and Family:     Attends Jewish Services:     Active Member of Clubs or Organizations:     Attends Club or Organization Meetings:     Marital Status:    Intimate Partner Violence:     Fear of Current or Ex-Partner:     Emotionally Abused:     Physically Abused:     Sexually Abused:      No family history on file.   Allergies   Allergen Reactions    Pcn [Penicillins]     Vesicare [Solifenacin]      Current Outpatient Medications on File Prior to Visit   Medication Sig Dispense Refill    QUEtiapine (SEROQUEL) 25 MG tablet Take 12.5 mg by mouth 2 times daily      guaiFENesin (ROBITUSSIN) 100 MG/5ML syrup Take 200 mg by mouth 4 times daily as needed for Cough      aspirin 325 MG tablet Take 325 mg by mouth daily      propranolol (INDERAL) 20 MG tablet Take 20 mg by mouth nightly      donepezil (ARICEPT) 5 MG tablet Take 5 mg by mouth nightly      escitalopram (LEXAPRO) 10 MG tablet Take 10 mg by mouth daily      diclofenac sodium (VOLTAREN) 1 % GEL Apply 2 g topically 4 times daily To affected shoulders 240 g 5    acetaminophen (TYLENOL) 500 MG tablet Take 500 mg by mouth 2 times daily      diltiazem (DILACOR XR) 240 MG extended release capsule Take 240 mg by mouth daily      docusate sodium (COLACE) 100 MG capsule Take 100 mg by mouth 2 times daily      hydrochlorothiazide (HYDRODIURIL) 12.5 MG tablet Take 12.5 mg by mouth daily      meclizine (ANTIVERT) 25 MG tablet Take 25 mg by mouth 2 times daily       potassium chloride (KLOR-CON M) 10 MEQ extended release tablet Take 10 mEq by mouth daily      Cholecalciferol (VITAMIN D) 2000 units CAPS capsule Take 2,000 Units by mouth daily       No current facility-administered medications on file prior to visit. Review of Systems   Constitutional: Positive for fatigue. Negative for activity change, appetite change, chills, diaphoresis, fever and unexpected weight change. HENT: Negative for drooling, hearing loss, mouth sores, sore throat, trouble swallowing and voice change. Eyes: Negative for discharge and visual disturbance. Respiratory: Negative for apnea, cough, choking, chest tightness, shortness of breath, wheezing and stridor. Cardiovascular: Negative for chest pain, palpitations and leg swelling. Gastrointestinal: Negative for abdominal distention, abdominal pain, anal bleeding, blood in stool, constipation, diarrhea, nausea, rectal pain and vomiting. Genitourinary: Negative for difficulty urinating, dysuria, enuresis, frequency and hematuria. Musculoskeletal: Positive for arthralgias. Negative for back pain, gait problem, joint swelling and myalgias. Skin: Negative for color change, pallor, rash and wound. Allergic/Immunologic: Negative for food allergies and immunocompromised state. Neurological: Negative for dizziness, tremors, seizures, syncope, facial asymmetry, speech difficulty, weakness, light-headedness, numbness and headaches. Hematological: Negative for adenopathy. Does not bruise/bleed easily.    Psychiatric/Behavioral: Positive for hallucinations. Negative for agitation, behavioral problems, confusion, decreased concentration, dysphoric mood, self-injury, sleep disturbance and suicidal ideas. The patient is nervous/anxious. The patient is not hyperactive. Objective:   /74   Pulse 65   Resp 18   SpO2 96%     Physical Exam  Constitutional:       General: She is not in acute distress. Appearance: She is well-developed. She is not diaphoretic. HENT:      Head: Normocephalic and atraumatic. Right Ear: External ear normal.      Left Ear: External ear normal.      Nose: Nose normal.      Mouth/Throat:      Pharynx: No oropharyngeal exudate. Eyes:      General: No scleral icterus. Right eye: No discharge. Left eye: No discharge. Conjunctiva/sclera: Conjunctivae normal.      Pupils: Pupils are equal, round, and reactive to light. Neck:      Thyroid: No thyromegaly. Vascular: No JVD. Trachea: No tracheal deviation. Cardiovascular:      Rate and Rhythm: Normal rate and regular rhythm. Heart sounds: Murmur heard. Pulmonary:      Effort: Pulmonary effort is normal. No respiratory distress. Breath sounds: Normal breath sounds. No stridor. No wheezing or rales. Chest:      Chest wall: No tenderness. Abdominal:      General: Bowel sounds are normal. There is no distension. Palpations: Abdomen is soft. There is no mass. Tenderness: There is no abdominal tenderness. There is no guarding or rebound. Musculoskeletal:         General: No tenderness or deformity. Normal range of motion. Cervical back: Normal range of motion and neck supple. Lymphadenopathy:      Cervical: No cervical adenopathy. Skin:     General: Skin is warm and dry. Capillary Refill: Capillary refill takes less than 2 seconds. Findings: No erythema or rash. Neurological:      Mental Status: She is alert. Mental status is at baseline.    Psychiatric:         Attention and Perception: Attention and perception normal.         Speech: Speech normal.         Behavior: Behavior normal.         Thought Content: Thought content normal.           Assessment:       Diagnosis Orders   1. Palliative care encounter     2. Arthralgia, unspecified joint     3. Dementia with behavioral disturbance, unspecified dementia type ([facility] [address]     4. Skin tear of right hand without complication, initial encounter            Plan:    -Dementia with behavioral disturbance  Monitor for signs and symptoms of delirium as due to advanced age, history of cognitive difficulty, acute illness, and multiple comorbidities patient is high risk. Offer continuous behavior redirection and orientation, avoid delirium inducing medication, expose to natural sunlight as much as possible, calm surroundings as much as possible. Continue seroquel 12.5 mg BID, aricept, and lexapro. Multi-joint pain related to immobility and OA  - Contine Acetaminophen and diclofenac gel  - Heat or ice to painful areas, whichever is preferred  - Gentle ROM exercises    Skin tear right hand  - Continue to dress with antibiotic ointment  - Call for edema, discharge, increasing pain    No orders of the defined types were placed in this encounter. No orders of the defined types were placed in this encounter. No follow-ups on file.     Kalpana Zapata, APRN - CNP

## 2021-07-15 VITALS — HEART RATE: 60 BPM | SYSTOLIC BLOOD PRESSURE: 112 MMHG | RESPIRATION RATE: 16 BRPM | DIASTOLIC BLOOD PRESSURE: 66 MMHG

## 2021-07-16 NOTE — PROGRESS NOTES
[facility]  [address] [place] [address]      6/16/2021    Scott Malik  is a 80 y.o. in the NF being seen for a f/u of   Chief Complaint   Patient presents with    3 Month Follow-Up     c/o dizziness       Spoke to nursing and ancillary staff regarding patient status, present state of health. Need for nursing care and assistance. Reviewed chart, labs, medications in NF chart and allergies. Aging in place at this time. HPI lives in  assisted living. Being seen for chronic illnesses vertigo and detrusor instability. Has hx of vertigo some days none and some days worse than others  Has urine incontinence. With constant urine dribbling. Hallucination hx. Mostly not frightening to pt, some days worse than others. Family wants pt comfortable and not a lot of unecessary medications or treatments. Family wants conservative care. Pt is comfortable and happy, per her report hallucinations do not bother her. So no additional meds to try to treat hallucinations at this time  She does at times have UTIs that worsen hallucinations. U/A checks often. Reports from nurse, pt is at baseline, no change in vision, hearing. No headaches or choking issues. Uses walker &  wheel chair for locomotion in facility. No decrease in appetite, no change in B/B habits. No pain crises. NO fevers. Can make needs known to nurse. Past Medical History:   Diagnosis Date    Benign essential HTN 11/17/2019    Constipation 7/26/2016    Detrusor instability 11/17/2019    Dyslipidemia 11/17/2019    Heart valve disease 11/17/2019    Longstanding persistent atrial fibrillation ([facility] [address] 11/17/2019    Primary osteoarthritis involving multiple joints 11/17/2019     No past surgical history on file. No family history on file. Social History     Socioeconomic History    Marital status:       Spouse name: Not on file    Number of children: Not on file    Years of education: Not on file    Highest education level: Not on file   Occupational History    Not on file   Tobacco Use    Smoking status: Never Smoker    Smokeless tobacco: Never Used   Substance and Sexual Activity    Alcohol use: Not on file    Drug use: Not on file    Sexual activity: Not on file   Other Topics Concern    Not on file   Social History Narrative    Not on file     Social Determinants of Health     Financial Resource Strain:     Difficulty of Paying Living Expenses:    Food Insecurity:     Worried About Running Out of Food in the Last Year:     [address] in the Last Year:    Transportation Needs:     Lack of Transportation (Medical):  Lack of Transportation (Non-Medical):    Physical Activity:     Days of Exercise per Week:     Minutes of Exercise per Session:    Stress:     Feeling of Stress :    Social Connections:     Frequency of Communication with Friends and Family:     Frequency of Social Gatherings with Friends and Family:     Attends Moravian Services:     Active Member of Clubs or Organizations:     Attends Club or Organization Meetings:     Marital Status:    Intimate Partner Violence:     Fear of Current or Ex-Partner:     Emotionally Abused:     Physically Abused:     Sexually Abused: Allergies: Pcn [penicillins] and Vesicare [solifenacin]  NF MEDICATIONS REVIEWED    ROS:  See HPI  Constitutional: There are no reports of behavioral issues, change in appetite, fever, or weakness. No gross bleeding issues. At times difficulty ambulating to meals or activities. Respiratory: denies SOB, dyspnea,   Cardiovascular: denies CP, but c/o lightheadedness or dizzy at times, on meclizine,   GI: No reports of change of bowel habits, no N/V/D/C.    : no reports of change in bladder habits, has incont and frequency  Extremities: No reports of uncontrolled pain issues,  no chronic edema    Physical exam:   /66   Pulse 60   Resp 16   Constitutional: Awake and alert sitting up in chair,   HEENT: normocephalic,  PEAR, MMM, no cyanosis. NO neck mass visualized , facial asymmetry  At baseline, has hx of droop. Cardiovascular: Regular rate  Respiratory: LCTA, unlabored respirations  GI: abdomen ND  : no bladder tenderness  Extremities: no gross edema,  Psych: pleasant and smiling, able to follow simple commands,  speech clear, conversational. Appropriate at visit. Forgetful at times. ASSESSMENT:     Diagnosis Orders   1. Detrusor instability     2. Benign paroxysmal positional vertigo, unspecified laterality         PLAN:   1. Long hx of incontinence. ocassional UTI, treat as needed. 2. Meclizine fair effectiveness , has had for many years. Adds to fall risk. No blood thinners. Return in about 3 months (around 9/16/2021). Pertinent POC, labs, have been reviewed, continue same. Encourage fluids and good nutrition. Stress fall prevention strategies.   Pt/POA agrees to [address], APRN-CNP      Janeth Singh DNP, MSN, RN, GNP-BC, NP-C  Adult/[facility] Nurse Practitioner  [address]  Department of Geriatrics  8:30am-4:30pm   [phone]  After hours Answering service [phone]

## 2021-08-04 ENCOUNTER — OFFICE VISIT (OUTPATIENT)
Dept: GERIATRIC MEDICINE | Age: 86
End: 2021-08-04
Payer: MEDICARE

## 2021-08-04 VITALS
HEIGHT: 61 IN | HEART RATE: 66 BPM | BODY MASS INDEX: 30.29 KG/M2 | TEMPERATURE: 97.9 F | RESPIRATION RATE: 18 BRPM | WEIGHT: 160.4 LBS

## 2021-08-04 DIAGNOSIS — H81.10 BENIGN PAROXYSMAL POSITIONAL VERTIGO, UNSPECIFIED LATERALITY: ICD-10-CM

## 2021-08-04 DIAGNOSIS — I48.20 CHRONIC ATRIAL FIBRILLATION (HCC): ICD-10-CM

## 2021-08-04 DIAGNOSIS — K59.01 SLOW TRANSIT CONSTIPATION: ICD-10-CM

## 2021-08-04 DIAGNOSIS — G30.1 LATE ONSET ALZHEIMER'S DISEASE WITH BEHAVIORAL DISTURBANCE (HCC): ICD-10-CM

## 2021-08-04 DIAGNOSIS — F02.818 LATE ONSET ALZHEIMER'S DISEASE WITH BEHAVIORAL DISTURBANCE (HCC): ICD-10-CM

## 2021-08-04 DIAGNOSIS — Z91.81 AT HIGH RISK FOR FALLS: ICD-10-CM

## 2021-08-04 DIAGNOSIS — M15.9 PRIMARY OSTEOARTHRITIS INVOLVING MULTIPLE JOINTS: Primary | ICD-10-CM

## 2021-08-04 ASSESSMENT — PATIENT HEALTH QUESTIONNAIRE - PHQ9
2. FEELING DOWN, DEPRESSED OR HOPELESS: 0
SUM OF ALL RESPONSES TO PHQ QUESTIONS 1-9: 0
1. LITTLE INTEREST OR PLEASURE IN DOING THINGS: 0
SUM OF ALL RESPONSES TO PHQ9 QUESTIONS 1 & 2: 0
SUM OF ALL RESPONSES TO PHQ QUESTIONS 1-9: 0
SUM OF ALL RESPONSES TO PHQ QUESTIONS 1-9: 0

## 2021-08-04 ASSESSMENT — ENCOUNTER SYMPTOMS
SHORTNESS OF BREATH: 0
GASTROINTESTINAL NEGATIVE: 1
EYE PAIN: 0
CHEST TIGHTNESS: 0
ALLERGIC/IMMUNOLOGIC NEGATIVE: 1
EYE REDNESS: 0
COUGH: 0
APNEA: 0
RHINORRHEA: 1
EYE DISCHARGE: 1
SORE THROAT: 0

## 2021-08-04 NOTE — PROGRESS NOTES
[facility] [address] [facility]      Assisted living apartment    Subjective: Annual H & P    8/4/2021    Patient ID: Delma Malik is a 80 y.o. female being seen today for:   Chief Complaint   Patient presents with    H&P     annual for assited living     DNR CCA uses a walker for locomotion in the facility  Her daughter Sherri is her POA and the family is very active in her life    Pt continues to live in assisted living apartment. Spoke to nursing and ancillary staff regarding patient status, present state of health. Need for nursing care and assistance. Reviewed chart, labs, medications in NF chart and allergies. Aging in place at this time. Assisted living annual H & P    patient being seen for follow-up of her annual history and physical that is due for assisted living.   She is continue to decline over the last year she remains high fall risk with her dementia ataxia and vertigo she had to be taken off of her blood thinner to reduce the risk of a blood bruit bleed she does have atrial fibrillation but she has a very good rate, she takes meclizine 3 times a day it was as needed we made it routine because she is forgetful not asking for it but it still does not help much she says sometimes it helps sometimes it does not  We tried her on aricept and her hallucinations and night time dreaming got worse she does have dementia with halluciantions she is aware when she is having hallucinations and generally they are not frightening to her per her report      Poor short term memory  Fair long term memory  Needs no  assist/cues with decisions  Needs assist with ADLs  Needs assist with IADLs  Poor hearing, wears aids   Poorvision, glasses   + Falls risk    Past Medical History:   Diagnosis Date    Benign essential HTN 11/17/2019    Constipation 7/26/2016    Detrusor instability 11/17/2019    Dyslipidemia 11/17/2019    Heart valve disease 11/17/2019  [facility] 11/[facility]    Primary osteoarthritis involving multiple joints 11/17/2019     No past surgical history on file. No family history on file. Social History     Socioeconomic History    Marital status:      Spouse name: Not on file    Number of children: Not on file    Years of education: Not on file    Highest education level: Not on file   Occupational History    Not on file   Tobacco Use    Smoking status: Never Smoker    Smokeless tobacco: Never Used   Substance and Sexual Activity    Alcohol use: Not on file    Drug use: Not on file    Sexual activity: Not on file   Other Topics Concern    Not on file   Social History Narrative    Not on file     Social Determinants of Health     Financial Resource Strain:     Difficulty of Paying Living Expenses:    Food Insecurity:     Worried About Running Out of Food in the Last Year:     [address] in the Last Year:    Transportation Needs:     Lack of Transportation (Medical):  Lack of Transportation (Non-Medical):    Physical Activity:     Days of Exercise per Week:     Minutes of Exercise per Session:    Stress:     Feeling of Stress :    Social Connections:     Frequency of Communication with Friends and Family:     Frequency of Social Gatherings with Friends and Family:     Attends Episcopalian Services:     Active Member of Clubs or Organizations:     Attends Club or Organization Meetings:     Marital Status:    Intimate Partner Violence:     Fear of Current or Ex-Partner:     Emotionally Abused:     Physically Abused:     Sexually Abused:         Allergies: Pcn [penicillins] and Vesicare [solifenacin]  Current Outpatient Medications on File Prior to Visit   Medication Sig Dispense Refill    QUEtiapine (SEROQUEL) 25 MG tablet Take 12.5 mg by mouth 2 times daily      guaiFENesin (ROBITUSSIN) 100 MG/5ML syrup Take 200 mg by mouth 4 times daily as needed for Cough      aspirin 325 MG tablet Take 325 mg by mouth daily      propranolol (INDERAL) 20 MG tablet Take 20 mg by mouth nightly      donepezil (ARICEPT) 5 MG tablet Take 5 mg by mouth nightly      escitalopram (LEXAPRO) 10 MG tablet Take 10 mg by mouth daily      diclofenac sodium (VOLTAREN) 1 % GEL Apply 2 g topically 4 times daily To affected shoulders 240 g 5    acetaminophen (TYLENOL) 500 MG tablet Take 500 mg by mouth 2 times daily      diltiazem (DILACOR XR) 240 MG extended release capsule Take 240 mg by mouth daily      docusate sodium (COLACE) 100 MG capsule Take 100 mg by mouth 2 times daily      hydrochlorothiazide (HYDRODIURIL) 12.5 MG tablet Take 12.5 mg by mouth daily      meclizine (ANTIVERT) 25 MG tablet Take 25 mg by mouth 2 times daily       potassium chloride (KLOR-CON M) 10 MEQ extended release tablet Take 10 mEq by mouth daily      Cholecalciferol (VITAMIN D) 2000 units CAPS capsule Take 2,000 Units by mouth daily       No current facility-administered medications on file prior to visit. Facility medications include   Tylenol 500 nightly hour routine  Aspirin 325 mg daily for atrial fibrillation  Diclofenac gel 1% 2 g to shoulders 4 times a day  Diltiazem 240 mg ER capsule 1 daily  Colace 100 mg twice daily  Hydrochlorothiazide 25 mg daily  Meclizine 25 mg TID  Potassium chloride 10 milliequivalents every day  Propranolol 20 mg daily for tremors  Seroquel 12.5 mg twice daily for hallucinations  Systane long-lasting 1 drop both eyes for dry eye  Vitamin D3 2000 units daily  Metamucil as needed  Nystatin cream as needed  Robitussin as needed  Tylenol as needed    Review of Systems   Constitutional: Positive for activity change. Negative for appetite change, chills, fatigue and fever. HENT: Positive for congestion and rhinorrhea. Negative for sore throat and tinnitus. Eyes: Positive for discharge (left) and visual disturbance (cannot see well). Negative for pain and redness.    Respiratory: Negative for apnea, cough, chest tightness and shortness of breath. Cardiovascular: Positive for leg swelling. Negative for chest pain and palpitations. Gastrointestinal: Negative. Endocrine: Negative. Genitourinary: Positive for dysuria and frequency. Incontinence, constant dribbling   Musculoskeletal: Positive for arthralgias, gait problem and myalgias. Skin: Negative. Allergic/Immunologic: Negative. Neurological: Positive for dizziness, tremors, facial asymmetry, speech difficulty and weakness (general ). Negative for seizures, syncope, light-headedness, numbness and headaches. Psychiatric/Behavioral: Positive for confusion, decreased concentration and hallucinations. Negative for agitation, dysphoric mood and suicidal ideas. The patient is not nervous/anxious.        :   BP (!) 155/77   Pulse 66   Temp 97.9 °F (36.6 °C)   Resp 18   Ht 5' 1\" (1.549 m)   Wt 160 lb 6.4 oz (72.8 kg)   BMI 30.31 kg/m²     Physical Exam  Vitals and nursing note reviewed. Constitutional:       General: She is not in acute distress. Appearance: She is not toxic-appearing. HENT:      Head: Normocephalic and atraumatic. Right Ear: External ear normal.      Left Ear: External ear normal.      Nose: Rhinorrhea present. Mouth/Throat:      Mouth: Mucous membranes are moist.      Pharynx: Oropharynx is clear. Eyes:      Extraocular Movements: Extraocular movements intact. Pupils: Pupils are equal, round, and reactive to light. Comments: Left eye ptosis upper lid   Neck:      Comments: Decreased but functional ROM   Cardiovascular:      Rate and Rhythm: Normal rate. Rhythm irregular. Pulses: Normal pulses. Heart sounds: Murmur heard. No friction rub. No gallop. Pulmonary:      Effort: Pulmonary effort is normal.      Breath sounds: Normal breath sounds. No rhonchi. Abdominal:      General: Bowel sounds are normal.      Palpations: Abdomen is soft.       Tenderness: [facility]  8:30am-4:30pm   [phone]  After hours Answering service [phone]

## 2021-08-24 ENCOUNTER — OFFICE VISIT (OUTPATIENT)
Dept: PALLATIVE CARE | Age: 86
End: 2021-08-24
Payer: MEDICARE

## 2021-08-24 VITALS
OXYGEN SATURATION: 94 % | HEART RATE: 65 BPM | DIASTOLIC BLOOD PRESSURE: 72 MMHG | RESPIRATION RATE: 18 BRPM | SYSTOLIC BLOOD PRESSURE: 124 MMHG

## 2021-08-24 DIAGNOSIS — M15.9 PRIMARY OSTEOARTHRITIS INVOLVING MULTIPLE JOINTS: ICD-10-CM

## 2021-08-24 DIAGNOSIS — Z51.5 PALLIATIVE CARE ENCOUNTER: ICD-10-CM

## 2021-08-24 DIAGNOSIS — F02.818 LATE ONSET ALZHEIMER'S DISEASE WITH BEHAVIORAL DISTURBANCE (HCC): Primary | ICD-10-CM

## 2021-08-24 DIAGNOSIS — F03.918 BEHAVIORAL AND PSYCHOLOGICAL SYMPTOMS OF DEMENTIA: ICD-10-CM

## 2021-08-24 DIAGNOSIS — G30.1 LATE ONSET ALZHEIMER'S DISEASE WITH BEHAVIORAL DISTURBANCE (HCC): Primary | ICD-10-CM

## 2021-08-24 ASSESSMENT — ENCOUNTER SYMPTOMS
SHORTNESS OF BREATH: 0
BLOOD IN STOOL: 0
RECTAL PAIN: 0
TROUBLE SWALLOWING: 0
ANAL BLEEDING: 0
EYE DISCHARGE: 0
ABDOMINAL DISTENTION: 0
DIARRHEA: 0
CONSTIPATION: 0
CHEST TIGHTNESS: 0
VOICE CHANGE: 0
WHEEZING: 0
BACK PAIN: 0
COUGH: 0
STRIDOR: 0
SORE THROAT: 0
CHOKING: 0
VOMITING: 0
ABDOMINAL PAIN: 0
APNEA: 0
NAUSEA: 0
COLOR CHANGE: 0

## 2021-08-24 NOTE — PROGRESS NOTES
[employer] of [address] [facility], [address] [facility]    Subjective:      Patient Id:  Juani Strange is a 80 y.o. female who presents today with   Chief Complaint   Patient presents with    Follow-up       HPI     Seen at HOSP DR. BECCA SANTIAGO for symptom management follow up rt behavorial and psychological symptoms of Late onset Alzheimer's disease,OA, HTN, HLD, Vertigo, A fib, depression, and heart valve disease. Up at lunch table, alert and talkative, in good humour. Just had her nails done and is admiring the colors. Complains of hallucinations but staff feel they are improved from last visit. Seroquel has recently been changed to 12.5 mg BID. She is able to be redirected and calmed down when disoriented. Pain in her shoulders has improved and she is not currently having any other pain. No dysphagia, dysgeusia, or mouth sores; weight stable, Appetite is good. No GI or  concerns. No nik blood in stool or urine. SOB and edema at baseline. Negative excessive fatigue, fever, chills, myalgias, increased sputum production or cough, nausea, vomiting, chest pain, or orthopnea. Negative significant Sleep disturbance, depression, anxiety, or agitation episodes. Past Medical History:   Diagnosis Date    Benign essential HTN 11/17/2019    Constipation 7/26/2016    Detrusor instability 11/17/2019    Dyslipidemia 11/17/2019    Heart valve disease 11/17/2019    Longstanding persistent atrial fibrillation ([address] 11/17/2019    Primary osteoarthritis involving multiple joints 11/17/2019     No past surgical history on file. Social History     Socioeconomic History    Marital status:       Spouse name: Not on file    Number of children: Not on file    Years of education: Not on file    Highest education level: Not on file   Occupational History    Not on file   Tobacco Use    Smoking status: Never Smoker    Smokeless tobacco: Never Used   Substance and Sexual Activity  Alcohol use: Not on file    Drug use: Not on file    Sexual activity: Not on file   Other Topics Concern    Not on file   Social History Narrative    Not on file     Social Determinants of Health     Financial Resource Strain:     Difficulty of Paying Living Expenses:    Food Insecurity:     Worried About Running Out of Food in the Last Year:     [address] in the Last Year:    Transportation Needs:     Lack of Transportation (Medical):  Lack of Transportation (Non-Medical):    Physical Activity:     Days of Exercise per Week:     Minutes of Exercise per Session:    Stress:     Feeling of Stress :    Social Connections:     Frequency of Communication with Friends and Family:     Frequency of Social Gatherings with Friends and Family:     Attends Confucianism Services:     Active Member of Clubs or Organizations:     Attends Club or Organization Meetings:     Marital Status:    Intimate Partner Violence:     Fear of Current or Ex-Partner:     Emotionally Abused:     Physically Abused:     Sexually Abused:      No family history on file.   Allergies   Allergen Reactions    Pcn [Penicillins]     Vesicare [Solifenacin]      Current Outpatient Medications on File Prior to Visit   Medication Sig Dispense Refill    QUEtiapine (SEROQUEL) 25 MG tablet Take 12.5 mg by mouth 2 times daily      guaiFENesin (ROBITUSSIN) 100 MG/5ML syrup Take 200 mg by mouth 4 times daily as needed for Cough      aspirin 325 MG tablet Take 325 mg by mouth daily      propranolol (INDERAL) 20 MG tablet Take 20 mg by mouth nightly      donepezil (ARICEPT) 5 MG tablet Take 5 mg by mouth nightly      diclofenac sodium (VOLTAREN) 1 % GEL Apply 2 g topically 4 times daily To affected shoulders 240 g 5    acetaminophen (TYLENOL) 500 MG tablet Take 500 mg by mouth 2 times daily      diltiazem (DILACOR XR) 240 MG extended release capsule Take 240 mg by mouth daily      docusate sodium (COLACE) 100 MG capsule Take 100 mg by mouth 2 times daily      hydrochlorothiazide (HYDRODIURIL) 12.5 MG tablet Take 12.5 mg by mouth daily      meclizine (ANTIVERT) 25 MG tablet Take 25 mg by mouth 2 times daily       potassium chloride (KLOR-CON M) 10 MEQ extended release tablet Take 10 mEq by mouth daily      Cholecalciferol (VITAMIN D) 2000 units CAPS capsule Take 2,000 Units by mouth daily       No current facility-administered medications on file prior to visit. Review of Systems   Constitutional: Positive for fatigue. Negative for activity change, appetite change, chills, diaphoresis, fever and unexpected weight change. HENT: Negative for drooling, hearing loss, mouth sores, sore throat, trouble swallowing and voice change. Eyes: Negative for discharge and visual disturbance. Respiratory: Negative for apnea, cough, choking, chest tightness, shortness of breath, wheezing and stridor. Cardiovascular: Negative for chest pain, palpitations and leg swelling. Gastrointestinal: Negative for abdominal distention, abdominal pain, anal bleeding, blood in stool, constipation, diarrhea, nausea, rectal pain and vomiting. Genitourinary: Negative for difficulty urinating, dysuria, enuresis, frequency and hematuria. Musculoskeletal: Positive for arthralgias. Negative for back pain, gait problem, joint swelling and myalgias. Skin: Negative for color change, pallor, rash and wound. Allergic/Immunologic: Negative for food allergies and immunocompromised state. Neurological: Negative for dizziness, tremors, seizures, syncope, facial asymmetry, speech difficulty, weakness, light-headedness, numbness and headaches. Hematological: Negative for adenopathy. Does not bruise/bleed easily. Psychiatric/Behavioral: Positive for hallucinations. Negative for agitation, behavioral problems, confusion, decreased concentration, dysphoric mood, self-injury, sleep disturbance and suicidal ideas. The patient is nervous/anxious.  The patient is not hyperactive. Objective: There were no vitals taken for this visit. Physical Exam  Constitutional:       General: She is not in acute distress. Appearance: She is well-developed. She is not diaphoretic. HENT:      Head: Normocephalic and atraumatic. Right Ear: External ear normal.      Left Ear: External ear normal.      Nose: Nose normal.      Mouth/Throat:      Pharynx: No oropharyngeal exudate. Eyes:      General: No scleral icterus. Right eye: No discharge. Left eye: No discharge. Conjunctiva/sclera: Conjunctivae normal.      Pupils: Pupils are equal, round, and reactive to light. Neck:      Thyroid: No thyromegaly. Vascular: No JVD. Trachea: No tracheal deviation. Cardiovascular:      Rate and Rhythm: Normal rate and regular rhythm. Heart sounds: Murmur heard. Pulmonary:      Effort: Pulmonary effort is normal. No respiratory distress. Breath sounds: Normal breath sounds. No stridor. No wheezing or rales. Chest:      Chest wall: No tenderness. Abdominal:      General: Bowel sounds are normal. There is no distension. Palpations: Abdomen is soft. There is no mass. Tenderness: There is no abdominal tenderness. There is no guarding or rebound. Musculoskeletal:         General: No tenderness or deformity. Normal range of motion. Cervical back: Normal range of motion and neck supple. Lymphadenopathy:      Cervical: No cervical adenopathy. Skin:     General: Skin is warm and dry. Capillary Refill: Capillary refill takes less than 2 seconds. Findings: No erythema or rash. Neurological:      Mental Status: She is alert. Mental status is at baseline. Psychiatric:         Attention and Perception: Attention and perception normal.         Speech: Speech normal.         Behavior: Behavior normal.         Thought Content: Thought content normal.           Assessment:       Diagnosis Orders   1.  Late onset Alzheimer's disease with behavioral disturbance ([facility]     2. Behavioral and psychological symptoms of dementia ([facility]     3. Primary osteoarthritis involving multiple joints     4. Palliative care encounter            Plan:    -Dementia with behavioral disturbance  Monitor for signs and symptoms of delirium as due to advanced age, history of cognitive difficulty, acute illness, and multiple comorbidities patient is high risk. Offer continuous behavior redirection and orientation, avoid delirium inducing medication, expose to natural sunlight as much as possible, calm surroundings as much as possible. Continue seroquel 12.5 mg BID, aricept, and lexapro. Multi-joint pain related to immobility and OA  - Contine Acetaminophen and diclofenac gel  - Heat or ice to painful areas, whichever is preferred  - Gentle ROM exercises    No orders of the defined types were placed in this encounter. No orders of the defined types were placed in this encounter. No follow-ups on file.     Ector Fajardo, APRN - CNP

## 2021-09-28 ENCOUNTER — OFFICE VISIT (OUTPATIENT)
Dept: PALLATIVE CARE | Age: 86
End: 2021-09-28
Payer: MEDICARE

## 2021-09-28 VITALS
SYSTOLIC BLOOD PRESSURE: 124 MMHG | OXYGEN SATURATION: 96 % | RESPIRATION RATE: 18 BRPM | DIASTOLIC BLOOD PRESSURE: 70 MMHG | HEART RATE: 65 BPM

## 2021-09-28 DIAGNOSIS — M15.9 PRIMARY OSTEOARTHRITIS INVOLVING MULTIPLE JOINTS: ICD-10-CM

## 2021-09-28 DIAGNOSIS — Z51.5 PALLIATIVE CARE ENCOUNTER: ICD-10-CM

## 2021-09-28 DIAGNOSIS — G30.1 LATE ONSET ALZHEIMER'S DISEASE WITH BEHAVIORAL DISTURBANCE (HCC): Primary | ICD-10-CM

## 2021-09-28 DIAGNOSIS — F32.1 CURRENT MODERATE EPISODE OF MAJOR DEPRESSIVE DISORDER WITHOUT PRIOR EPISODE (HCC): ICD-10-CM

## 2021-09-28 DIAGNOSIS — F02.818 LATE ONSET ALZHEIMER'S DISEASE WITH BEHAVIORAL DISTURBANCE (HCC): Primary | ICD-10-CM

## 2021-09-28 NOTE — PROGRESS NOTES
[employer] of [address] [facility], [address] [facility]    Subjective:      Patient Id:  River Alonso is a 80 y.o. female who presents today with   Chief Complaint   Patient presents with    Follow-up       HPI     Seen at hospitals DR. BECCA SANTIAGO for symptom management follow up rt behavorial and psychological symptoms of Late onset Alzheimer's disease,OA, HTN, HLD, Vertigo, A fib, depression, and heart valve disease. Resting in her chair, wakes to touch, calm and cooperative, in good humour. Complains of hallucinations but staff feel they are improved from last visit. Seroquel has recently been changed to 12.5 mg BID. She is able to be redirected and calmed down when disoriented. Pain in her shoulders has improved and she is not currently having any other pain. No dysphagia, dysgeusia, or mouth sores; weight stable, Appetite is good. No GI or  concerns. No nik blood in stool or urine. SOB and edema at baseline. Negative excessive fatigue, fever, chills, myalgias, increased sputum production or cough, nausea, vomiting, chest pain, or orthopnea. Negative significant Sleep disturbance, depression, anxiety, or agitation episodes. Past Medical History:   Diagnosis Date    Benign essential HTN 11/17/2019    Constipation 7/26/2016    Detrusor instability 11/17/2019    Dyslipidemia 11/17/2019    Heart valve disease 11/17/2019    Longstanding persistent atrial fibrillation ([facility] 11/17/2019    Primary osteoarthritis involving multiple joints 11/17/2019     No past surgical history on file. Social History     Socioeconomic History    Marital status:       Spouse name: Not on file    Number of children: Not on file    Years of education: Not on file    Highest education level: Not on file   Occupational History    Not on file   Tobacco Use    Smoking status: Never Smoker    Smokeless tobacco: Never Used   Substance and Sexual Activity    Alcohol use: Not on file    Drug use: Not on file    Sexual activity: Not on file   Other Topics Concern    Not on file   Social History Narrative    Not on file     Social Determinants of Health     Financial Resource Strain:     Difficulty of Paying Living Expenses:    Food Insecurity:     Worried About Running Out of Food in the Last Year:     [address] in the Last Year:    Transportation Needs:     Lack of Transportation (Medical):  Lack of Transportation (Non-Medical):    Physical Activity:     Days of Exercise per Week:     Minutes of Exercise per Session:    Stress:     Feeling of Stress :    Social Connections:     Frequency of Communication with Friends and Family:     Frequency of Social Gatherings with Friends and Family:     Attends Scientology Services:     Active Member of Clubs or Organizations:     Attends Club or Organization Meetings:     Marital Status:    Intimate Partner Violence:     Fear of Current or Ex-Partner:     Emotionally Abused:     Physically Abused:     Sexually Abused:      No family history on file.   Allergies   Allergen Reactions    Pcn [Penicillins]     Vesicare [Solifenacin]      Current Outpatient Medications on File Prior to Visit   Medication Sig Dispense Refill    QUEtiapine (SEROQUEL) 25 MG tablet Take 12.5 mg by mouth 2 times daily      guaiFENesin (ROBITUSSIN) 100 MG/5ML syrup Take 200 mg by mouth 4 times daily as needed for Cough      aspirin 325 MG tablet Take 325 mg by mouth daily      propranolol (INDERAL) 20 MG tablet Take 20 mg by mouth nightly      donepezil (ARICEPT) 5 MG tablet Take 5 mg by mouth nightly      diclofenac sodium (VOLTAREN) 1 % GEL Apply 2 g topically 4 times daily To affected shoulders 240 g 5    acetaminophen (TYLENOL) 500 MG tablet Take 500 mg by mouth 2 times daily      diltiazem (DILACOR XR) 240 MG extended release capsule Take 240 mg by mouth daily      docusate sodium (COLACE) 100 MG capsule Take 100 mg by mouth 2 times daily      hydrochlorothiazide (HYDRODIURIL) 12.5 MG tablet Take 12.5 mg by mouth daily      meclizine (ANTIVERT) 25 MG tablet Take 25 mg by mouth 2 times daily       potassium chloride (KLOR-CON M) 10 MEQ extended release tablet Take 10 mEq by mouth daily      Cholecalciferol (VITAMIN D) 2000 units CAPS capsule Take 2,000 Units by mouth daily       No current facility-administered medications on file prior to visit. Review of Systems   Constitutional: Positive for fatigue. Negative for activity change, appetite change, chills, diaphoresis, fever and unexpected weight change. HENT: Negative for drooling, hearing loss, mouth sores, sore throat, trouble swallowing and voice change. Eyes: Negative for discharge and visual disturbance. Respiratory: Negative for apnea, cough, choking, chest tightness, shortness of breath, wheezing and stridor. Cardiovascular: Negative for chest pain, palpitations and leg swelling. Gastrointestinal: Negative for abdominal distention, abdominal pain, anal bleeding, blood in stool, constipation, diarrhea, nausea, rectal pain and vomiting. Genitourinary: Negative for difficulty urinating, dysuria, enuresis, frequency and hematuria. Musculoskeletal: Positive for arthralgias. Negative for back pain, gait problem, joint swelling and myalgias. Skin: Negative for color change, pallor, rash and wound. Allergic/Immunologic: Negative for food allergies and immunocompromised state. Neurological: Negative for dizziness, tremors, seizures, syncope, facial asymmetry, speech difficulty, weakness, light-headedness, numbness and headaches. Hematological: Negative for adenopathy. Does not bruise/bleed easily. Psychiatric/Behavioral: Positive for hallucinations. Negative for agitation, behavioral problems, confusion, decreased concentration, dysphoric mood, self-injury, sleep disturbance and suicidal ideas. The patient is nervous/anxious. The patient is not hyperactive. Objective: There were no vitals taken for this visit. Physical Exam  Constitutional:       General: She is not in acute distress. Appearance: She is well-developed. She is not diaphoretic. HENT:      Head: Normocephalic and atraumatic. Right Ear: External ear normal.      Left Ear: External ear normal.      Nose: Nose normal.      Mouth/Throat:      Pharynx: No oropharyngeal exudate. Eyes:      General: No scleral icterus. Right eye: No discharge. Left eye: No discharge. Conjunctiva/sclera: Conjunctivae normal.      Pupils: Pupils are equal, round, and reactive to light. Neck:      Thyroid: No thyromegaly. Vascular: No JVD. Trachea: No tracheal deviation. Cardiovascular:      Rate and Rhythm: Normal rate and regular rhythm. Heart sounds: Murmur heard. Pulmonary:      Effort: Pulmonary effort is normal. No respiratory distress. Breath sounds: Normal breath sounds. No stridor. No wheezing or rales. Chest:      Chest wall: No tenderness. Abdominal:      General: Bowel sounds are normal. There is no distension. Palpations: Abdomen is soft. There is no mass. Tenderness: There is no abdominal tenderness. There is no guarding or rebound. Musculoskeletal:         General: No tenderness or deformity. Normal range of motion. Cervical back: Normal range of motion and neck supple. Lymphadenopathy:      Cervical: No cervical adenopathy. Skin:     General: Skin is warm and dry. Capillary Refill: Capillary refill takes less than 2 seconds. Findings: No erythema or rash. Neurological:      Mental Status: She is alert. Mental status is at baseline. Psychiatric:         Attention and Perception: Attention and perception normal.         Speech: Speech normal.         Behavior: Behavior normal.         Thought Content: Thought content normal.           Assessment:       Diagnosis Orders   1.  Late onset Alzheimer's disease with behavioral disturbance ([facility]     2. Behavioral and psychological symptoms of dementia ([facility] [address]     3. Primary osteoarthritis involving multiple joints     4. Palliative care encounter            Plan:    -Dementia with behavioral disturbance  Monitor for signs and symptoms of delirium as due to advanced age, history of cognitive difficulty, acute illness, and multiple comorbidities patient is high risk. Offer continuous behavior redirection and orientation, avoid delirium inducing medication, expose to natural sunlight as much as possible, calm surroundings as much as possible. Continue seroquel 12.5 mg BID, Aricept, and lexapro. Multi-joint pain related to immobility and OA  - Continue Acetaminophen and diclofenac gel  - Heat or ice to painful areas, whichever is preferred  - Gentle ROM exercises    Palliative Care  Estimated Fast 6c today  Not hospice eligible  Pall Care will continue to follow      No follow-ups on file.     Juan Miguel Azar CNP

## 2021-11-03 ENCOUNTER — OFFICE VISIT (OUTPATIENT)
Dept: GERIATRIC MEDICINE | Age: 86
End: 2021-11-03
Payer: MEDICARE

## 2021-11-03 DIAGNOSIS — T14.8XXA EXCORIATION: Primary | ICD-10-CM

## 2021-11-03 DIAGNOSIS — H57.89 EYE DISCHARGE: ICD-10-CM

## 2021-11-05 NOTE — PROGRESS NOTES
[facility]  [address]      Assisted living apartment    11/3/2021    Katheren Romberg Laucaitis  is a 80 y.o. in the [facility] being seen for a f/u of   Chief Complaint   Patient presents with    Eye Problem     crusty drainage       Spoke to nursing and ancillary staff regarding patient status, present state of health. Need for nursing care and assistance. Reviewed chart, labs, medications in NF chart and allergies. Aging in place at this time. HPI lives in  assisted living. Being seen for crusty eye drainage. No c/o change in vision. Remains pleasantly confused with delusions. Reports from nurse, pt is at baseline, no change in vision, hearing. No headaches or choking issues. No fevers. Past Medical History:   Diagnosis Date    Benign essential HTN 11/17/2019    Constipation 7/26/2016    Detrusor instability 11/17/2019    Dyslipidemia 11/17/2019    Heart valve disease 11/17/2019    Longstanding persistent atrial fibrillation ([facility] [address] 11/17/2019    Primary osteoarthritis involving multiple joints 11/17/2019     No past surgical history on file. No family history on file. Social History     Socioeconomic History    Marital status:       Spouse name: Not on file    Number of children: Not on file    Years of education: Not on file    Highest education level: Not on file   Occupational History    Not on file   Tobacco Use    Smoking status: Never Smoker    Smokeless tobacco: Never Used   Substance and Sexual Activity    Alcohol use: Not on file    Drug use: Not on file    Sexual activity: Not on file   Other Topics Concern    Not on file   Social History Narrative    Not on file     Social Determinants of Health     Financial Resource Strain:     Difficulty of Paying Living Expenses:    Food Insecurity:     Worried About Running Out of Food in the Last Year:     [address] in the Last Year:    Transportation Needs:     Lack of Transportation (Medical):  Lack of Transportation (Non-Medical):    Physical Activity:     Days of Exercise per Week:     Minutes of Exercise per Session:    Stress:     Feeling of Stress :    Social Connections:     Frequency of Communication with Friends and Family:     Frequency of Social Gatherings with Friends and Family:     Attends Congregation Services:     Active Member of Clubs or Organizations:     Attends Club or Organization Meetings:     Marital Status:    Intimate Partner Violence:     Fear of Current or Ex-Partner:     Emotionally Abused:     Physically Abused:     Sexually Abused: Allergies: Pcn [penicillins] and Vesicare [solifenacin]  NF MEDICATIONS REVIEWED    ROS:  See HPI  Constitutional: There are no reports of change in behavioral issues, no change in appetite, fever, or weakness. No gross bleeding issues. No difficulty ambulating to meals or activities. remains high fall risk with ataxia and unsteady gait. Eye drains yellow crust no purulence. Physical exam:   BP   126/76  T  98.2  P  68  R  16    Constitutional: Awake and alert sitting up in chair,   HEENT: normocephalic, PEAR,   Right bottom lid two small 2-4mm excoriated areas are moist serous and forming crusts. Looks like from friction rubs from fingers. Conjunctivae is clear, sclera is white  MMM, no cyanosis. NO neck mass visualized  facial asymmetry at baseline. Psych: pleasant and able to follow simple commands,  speech clear, confused repetitive at baseline    ASSESSMENT:     Diagnosis Orders   1. Excoriation     2. Eye discharge         PLAN:   Poly mixin B eye ointment to right eye  lower lid bid until healed     Return in about 2 months (around 1/3/2022). Pertinent POC, labs, have been reviewed, continue same. Encourage fluids and good nutrition. Stress fall prevention strategies.   Pt/POA agrees to Maximilian Rico, APRN-CNP      Terre Sandhoff, DNP, MSN, RN, GNP-BC, NP-C  Adult/Kishore

## 2021-11-10 ENCOUNTER — OFFICE VISIT (OUTPATIENT)
Dept: GERIATRIC MEDICINE | Age: 86
End: 2021-11-10
Payer: MEDICARE

## 2021-11-10 VITALS
TEMPERATURE: 98.1 F | DIASTOLIC BLOOD PRESSURE: 76 MMHG | RESPIRATION RATE: 18 BRPM | HEART RATE: 68 BPM | SYSTOLIC BLOOD PRESSURE: 126 MMHG

## 2021-11-10 DIAGNOSIS — H10.9 CONJUNCTIVITIS OF LEFT EYE, UNSPECIFIED CONJUNCTIVITIS TYPE: ICD-10-CM

## 2021-11-10 DIAGNOSIS — S05.01XA ABRASION OF RIGHT CORNEA, INITIAL ENCOUNTER: ICD-10-CM

## 2021-11-10 DIAGNOSIS — H02.135 SENILE ECTROPION OF BOTH LOWER EYELIDS: ICD-10-CM

## 2021-11-10 DIAGNOSIS — T14.8XXA EXCORIATION: Primary | ICD-10-CM

## 2021-11-10 DIAGNOSIS — H02.132 SENILE ECTROPION OF BOTH LOWER EYELIDS: ICD-10-CM

## 2021-11-11 NOTE — PROGRESS NOTES
[facility]  [address]      Assisted living apartment    11/10/2021    Juan Malik  is a 80 y.o. in the NF being seen for a f/u of   Chief Complaint   Patient presents with    Other     scratchy right eye       Spoke to nursing and ancillary staff regarding patient status, present state of health. Need for nursing care and assistance. Reviewed chart, labs, medications in NF chart and allergies. Aging in place at this time. HPI lives in  assisted living. Being seen for new left eye problem of one week  Last week had right eye lid erosion and was started on ointment bid. C/o right eye scratchy feel and left eye lid swelling. Tolerating ointment well. Wants left eye treated now. Reports from nurse,   no change in vision,  No headaches no fever , has some crusty eye drainage but no purulent drainage. Can make needs known to nurse. Remains delusional     Past Medical History:   Diagnosis Date    Benign essential HTN 11/17/2019    Constipation 7/26/2016    Detrusor instability 11/17/2019    Dyslipidemia 11/17/2019    Heart valve disease 11/17/2019    Longstanding persistent atrial fibrillation ([facility] [address] 11/17/2019    Primary osteoarthritis involving multiple joints 11/17/2019     No past surgical history on file. No family history on file. Social History     Socioeconomic History    Marital status:       Spouse name: Not on file    Number of children: Not on file    Years of education: Not on file    Highest education level: Not on file   Occupational History    Not on file   Tobacco Use    Smoking status: Never Smoker    Smokeless tobacco: Never Used   Substance and Sexual Activity    Alcohol use: Not on file    Drug use: Not on file    Sexual activity: Not on file   Other Topics Concern    Not on file   Social History Narrative    Not on file     Social Determinants of Health     Financial Resource Strain:    Drew Molina Difficulty of Paying Living Expenses: Not on file   Food Insecurity:     Worried About Running Out of Food in the Last Year: Not on file    Ran Out of Food in the Last Year: Not on file   Transportation Needs:     Lack of Transportation (Medical): Not on file    Lack of Transportation (Non-Medical): Not on file   Physical Activity:     Days of Exercise per Week: Not on file    Minutes of Exercise per Session: Not on file   Stress:     Feeling of Stress : Not on file   Social Connections:     Frequency of Communication with Friends and Family: Not on file    Frequency of Social Gatherings with Friends and Family: Not on file    Attends Restorationist Services: Not on file    Active Member of [address] [place] [address] [employer] or Organizations: Not on file    Attends Club or Organization Meetings: Not on file    Marital Status: Not on file   Intimate Partner Violence:     Fear of Current or Ex-Partner: Not on file    Emotionally Abused: Not on file    Physically Abused: Not on file    Sexually Abused: Not on file   Housing Stability:     Unable to Pay for Housing in the Last Year: Not on file    Number of [place] in the Last Year: Not on file    Unstable Housing in the Last Year: Not on file       Allergies: Pcn [penicillins] and Vesicare [solifenacin]  NF MEDICATIONS REVIEWED    ROS:  See HPI  Constitutional: There are no reports of  fever. No gross bleeding issues. HEENT: excoriation from last week improved with abx ointment. Eye feels scratchy, slight pain, . Like sand or rice grains in eye. Some blurriness. Left eye watering x few days  Hx dry eye       Physical exam:   /76   Pulse 68   Temp 98.1 °F (36.7 °C)   Resp 18   Constitutional: Awake and alert sitting up in chair,   HEENT: pupils reactive. Right lower lid excoriation is healing well. No conjunctivitis, sclera is white. Slight serous drainage. Left upper eyelid dark pink and edema and conjunctiva pink. Lid gaps with dry eye. MMM, no cyanosis.  NO neck mass visualized   Psych: pleasant and able to follow simple commands,    ASSESSMENT:     Diagnosis Orders   1. Excoriation     2. Abrasion of right cornea, initial encounter         PLAN:   Glory 128 to right eye q am, Continue ointment right eye at hs and add to left eye hs  Will consult ophthalmology if no improvement. Return in about 1 month (around 12/10/2021). Pertinent POC, labs, have been reviewed, continue same. Encourage fluids and good nutrition. Stress fall prevention strategies. Pt/POA agrees to [address], APRN-CNP      Kelsey Romeo, DNP, MSN, RN, GNP-BC, NP-C  Adult/Kishore Nurse Practitioner  [address]  Department of Geriatrics  8:30am-4:30pm   [phone]  After hours Answering service [phone]      Please note this report is partially produced by using speech recognition hardware. It may contain errors related to the system, including grammar, punctuation and spelling as well as words and phrases that may seem inaccurate.   For any questions or concerns feel free to contact me for clarification

## 2021-11-12 ENCOUNTER — OFFICE VISIT (OUTPATIENT)
Dept: PALLATIVE CARE | Age: 86
End: 2021-11-12
Payer: MEDICARE

## 2021-11-12 VITALS
SYSTOLIC BLOOD PRESSURE: 110 MMHG | HEART RATE: 64 BPM | OXYGEN SATURATION: 95 % | RESPIRATION RATE: 18 BRPM | DIASTOLIC BLOOD PRESSURE: 74 MMHG | BODY MASS INDEX: 30.23 KG/M2 | WEIGHT: 160 LBS

## 2021-11-12 DIAGNOSIS — G89.29 CHRONIC PAIN OF BOTH SHOULDERS: ICD-10-CM

## 2021-11-12 DIAGNOSIS — M25.511 CHRONIC PAIN OF BOTH SHOULDERS: ICD-10-CM

## 2021-11-12 DIAGNOSIS — M19.90 OSTEOARTHRITIS, UNSPECIFIED OSTEOARTHRITIS TYPE, UNSPECIFIED SITE: Primary | ICD-10-CM

## 2021-11-12 DIAGNOSIS — M25.512 CHRONIC PAIN OF BOTH SHOULDERS: ICD-10-CM

## 2021-11-12 DIAGNOSIS — G30.1 LATE ONSET ALZHEIMER'S DISEASE WITH BEHAVIORAL DISTURBANCE (HCC): ICD-10-CM

## 2021-11-12 DIAGNOSIS — Z51.5 PALLIATIVE CARE ENCOUNTER: ICD-10-CM

## 2021-11-12 DIAGNOSIS — M15.9 PRIMARY OSTEOARTHRITIS INVOLVING MULTIPLE JOINTS: ICD-10-CM

## 2021-11-12 DIAGNOSIS — M19.90 ARTHRITIS: ICD-10-CM

## 2021-11-12 DIAGNOSIS — F02.818 LATE ONSET ALZHEIMER'S DISEASE WITH BEHAVIORAL DISTURBANCE (HCC): ICD-10-CM

## 2021-11-12 RX ORDER — TRAMADOL HYDROCHLORIDE 50 MG/1
50 TABLET ORAL EVERY 12 HOURS PRN
Qty: 60 TABLET | Refills: 0 | Status: SHIPPED | OUTPATIENT
Start: 2021-11-12 | End: 2022-01-28 | Stop reason: SDUPTHER

## 2021-11-12 ASSESSMENT — ENCOUNTER SYMPTOMS
COLOR CHANGE: 0
CHEST TIGHTNESS: 0
BLOOD IN STOOL: 0
ABDOMINAL PAIN: 0
APNEA: 0
CONSTIPATION: 0
COUGH: 0
VOMITING: 0
VOICE CHANGE: 0
SORE THROAT: 0
ANAL BLEEDING: 0
BACK PAIN: 1
TROUBLE SWALLOWING: 0
ABDOMINAL DISTENTION: 0
STRIDOR: 0
DIARRHEA: 0
EYE DISCHARGE: 0
NAUSEA: 0
RECTAL PAIN: 0
SHORTNESS OF BREATH: 0
WHEEZING: 0
CHOKING: 0

## 2021-11-12 NOTE — PROGRESS NOTES
[facility] [address] [facility] [address] [facility], [address] [address]    Subjective:      Patient Id:  Deb Schafer is a 80 y.o. female who presents today with   Chief Complaint   Patient presents with    Follow-up       HPI     Seen at [facility] DR. BECCA SANTIAGO for symptom management follow up rt behavorial and psychological symptoms of Late onset Alzheimer's disease,OA, HTN, HLD, Vertigo, A fib, depression, and heart valve disease. Resting in her chair, wakes to touch, calm and cooperative, in good humour. Both Jacobo Wilson and staff tell me her multi joint OA pain, especially in her shoulders is no longer controlled with Acetaminophen and diclofenac gel. She is having difficulty with ambulation, ADLS, and sleeping due to pain. PAin is worse with movement, improved with rest, described a constant ache, moderate today,         Complains of hallucinations but staff feel they are improved from last visit. Seroquel has recently been changed to 12.5 mg BID. She is able to be redirected and calmed down when disoriented. No dysphagia, dysgeusia, or mouth sores; weight stable, Appetite is good. No GI or  concerns. No nik blood in stool or urine. SOB and edema at baseline. Negative excessive fatigue, fever, chills, myalgias, increased sputum production or cough, nausea, vomiting, chest pain, or orthopnea. Negative significant Sleep disturbance, depression, anxiety, or agitation episodes. Past Medical History:   Diagnosis Date    Benign essential HTN 11/17/2019    Constipation 7/26/2016    Detrusor instability 11/17/2019    Dyslipidemia 11/17/2019    Heart valve disease 11/17/2019    Longstanding persistent atrial fibrillation ([facility] [address] 11/17/2019    Primary osteoarthritis involving multiple joints 11/17/2019     No past surgical history on file. Social History     Socioeconomic History    Marital status:       Spouse name: Not on file    Number of children: Not on file    Years of education: Not on file    Highest education level: Not on file   Occupational History    Not on file   Tobacco Use    Smoking status: Never Smoker    Smokeless tobacco: Never Used   Substance and Sexual Activity    Alcohol use: Not on file    Drug use: Not on file    Sexual activity: Not on file   Other Topics Concern    Not on file   Social History Narrative    Not on file     Social Determinants of Health     Financial Resource Strain:     Difficulty of Paying Living Expenses:    Food Insecurity:     Worried About Running Out of Food in the Last Year:     [address] in the Last Year:    Transportation Needs:     Lack of Transportation (Medical):  Lack of Transportation (Non-Medical):    Physical Activity:     Days of Exercise per Week:     Minutes of Exercise per Session:    Stress:     Feeling of Stress :    Social Connections:     Frequency of Communication with Friends and Family:     Frequency of Social Gatherings with Friends and Family:     Attends [employer] Services:     Active Member of Clubs or Organizations:     Attends Club or Organization Meetings:     Marital Status:    Intimate Partner Violence:     Fear of Current or Ex-Partner:     Emotionally Abused:     Physically Abused:     Sexually Abused:      No family history on file.   Allergies   Allergen Reactions    Pcn [Penicillins]     Vesicare [Solifenacin]      Current Outpatient Medications on File Prior to Visit   Medication Sig Dispense Refill    QUEtiapine (SEROQUEL) 25 MG tablet Take 12.5 mg by mouth 2 times daily      guaiFENesin (ROBITUSSIN) 100 MG/5ML syrup Take 200 mg by mouth 4 times daily as needed for Cough      aspirin 325 MG tablet Take 325 mg by mouth daily      propranolol (INDERAL) 20 MG tablet Take 20 mg by mouth nightly      donepezil (ARICEPT) 5 MG tablet Take 5 mg by mouth nightly      diclofenac sodium (VOLTAREN) 1 % GEL Apply 2 g topically 4 times daily To affected shoulders 240 g 5    acetaminophen (TYLENOL) 500 MG tablet Take 500 mg by mouth 2 times daily      diltiazem (DILACOR XR) 240 MG extended release capsule Take 240 mg by mouth daily      docusate sodium (COLACE) 100 MG capsule Take 100 mg by mouth 2 times daily      hydrochlorothiazide (HYDRODIURIL) 12.5 MG tablet Take 12.5 mg by mouth daily      meclizine (ANTIVERT) 25 MG tablet Take 25 mg by mouth 2 times daily       potassium chloride (KLOR-CON M) 10 MEQ extended release tablet Take 10 mEq by mouth daily      Cholecalciferol (VITAMIN D) 2000 units CAPS capsule Take 2,000 Units by mouth daily       No current facility-administered medications on file prior to visit. Review of Systems   Constitutional: Positive for fatigue. Negative for activity change, appetite change, chills, diaphoresis, fever and unexpected weight change. HENT: Negative for drooling, hearing loss, mouth sores, sore throat, trouble swallowing and voice change. Eyes: Negative for discharge and visual disturbance. Respiratory: Negative for apnea, cough, choking, chest tightness, shortness of breath, wheezing and stridor. Cardiovascular: Negative for chest pain, palpitations and leg swelling. Gastrointestinal: Negative for abdominal distention, abdominal pain, anal bleeding, blood in stool, constipation, diarrhea, nausea, rectal pain and vomiting. Genitourinary: Negative for difficulty urinating, dysuria, enuresis, frequency and hematuria. Musculoskeletal: Positive for arthralgias. Negative for back pain, gait problem, joint swelling and myalgias. Skin: Negative for color change, pallor, rash and wound. Allergic/Immunologic: Negative for food allergies and immunocompromised state. Neurological: Negative for dizziness, tremors, seizures, syncope, facial asymmetry, speech difficulty, weakness, light-headedness, numbness and headaches. Hematological: Negative for adenopathy. Does not bruise/bleed easily.    Psychiatric/Behavioral: Positive for hallucinations. Negative for agitation, behavioral problems, confusion, decreased concentration, dysphoric mood, self-injury, sleep disturbance and suicidal ideas. The patient is nervous/anxious. The patient is not hyperactive. Objective: There were no vitals taken for this visit. Physical Exam  Constitutional:       General: She is not in acute distress. Appearance: She is well-developed. She is not diaphoretic. HENT:      Head: Normocephalic and atraumatic. Right Ear: External ear normal.      Left Ear: External ear normal.      Nose: Nose normal.      Mouth/Throat:      Pharynx: No oropharyngeal exudate. Eyes:      General: No scleral icterus. Right eye: No discharge. Left eye: No discharge. Conjunctiva/sclera: Conjunctivae normal.      Pupils: Pupils are equal, round, and reactive to light. Neck:      Thyroid: No thyromegaly. Vascular: No JVD. Trachea: No tracheal deviation. Cardiovascular:      Rate and Rhythm: Normal rate and regular rhythm. Heart sounds: Murmur heard. Pulmonary:      Effort: Pulmonary effort is normal. No respiratory distress. Breath sounds: Normal breath sounds. No stridor. No wheezing or rales. Chest:      Chest wall: No tenderness. Abdominal:      General: Bowel sounds are normal. There is no distension. Palpations: Abdomen is soft. There is no mass. Tenderness: There is no abdominal tenderness. There is no guarding or rebound. Musculoskeletal:         General: No tenderness or deformity. Normal range of motion. Cervical back: Normal range of motion and neck supple. Lymphadenopathy:      Cervical: No cervical adenopathy. Skin:     General: Skin is warm and dry. Capillary Refill: Capillary refill takes less than 2 seconds. Findings: No erythema or rash. Neurological:      Mental Status: She is alert. Mental status is at baseline.    Psychiatric:         Attention and Perception: Attention and perception normal.         Speech: Speech normal.         Behavior: Behavior normal.         Thought Content: Thought content normal.           Assessment:       Diagnosis Orders   1. Late onset Alzheimer's disease with behavioral disturbance ([address]     2. Behavioral and psychological symptoms of dementia ([address]     3. Primary osteoarthritis involving multiple joints     4. Palliative care encounter            Plan:    -Dementia with behavioral disturbance  Monitor for signs and symptoms of delirium as due to advanced age, history of cognitive difficulty, acute illness, and multiple comorbidities patient is high risk. Offer continuous behavior redirection and orientation, avoid delirium inducing medication, expose to natural sunlight as much as possible, calm surroundings as much as possible. Continue seroquel 12.5 mg BID, Aricept, and lexapro. Multi-joint pain related to immobility and OA  - Start Tramadol 50 mg po every 12 hours prn moderate to severe pain  - Continue Acetaminophen and diclofenac gel  - Heat or ice to painful areas, whichever is preferred  - Gentle ROM exercises    Palliative Care  Estimated Fast 6c today  Not hospice eligible  Pall Care will continue to follow      No follow-ups on file. [place] [address] [address], [address] [place] [address]    Subjective:      Patient Id:  Jhony Reynoso is a 80 y.o. female who presents today with No chief complaint on file. HPI    Past Medical History:   Diagnosis Date    Benign essential HTN 11/17/2019    Constipation 7/26/2016    Detrusor instability 11/17/2019    Dyslipidemia 11/17/2019    Heart valve disease 11/17/2019    Longstanding persistent atrial fibrillation ([address] 11/17/2019    Primary osteoarthritis involving multiple joints 11/17/2019     History reviewed. No pertinent surgical history.   Social History     Socioeconomic History    Marital status:      Spouse name: Not on file    Number of children: Not on file    Years of education: Not on file    Highest education level: Not on file   Occupational History    Not on file   Tobacco Use    Smoking status: Never Smoker    Smokeless tobacco: Never Used   Substance and Sexual Activity    Alcohol use: Not on file    Drug use: Not on file    Sexual activity: Not on file   Other Topics Concern    Not on file   Social History Narrative    Not on file     Social Determinants of Health     Financial Resource Strain:     Difficulty of Paying Living Expenses: Not on file   Food Insecurity:     Worried About Running Out of Food in the Last Year: Not on file    Rory of Food in the Last Year: Not on file   Transportation Needs:     Lack of Transportation (Medical): Not on file    Lack of Transportation (Non-Medical): Not on file   Physical Activity:     Days of Exercise per Week: Not on file    Minutes of Exercise per Session: Not on file   Stress:     Feeling of Stress : Not on file   Social Connections:     Frequency of Communication with Friends and Family: Not on file    Frequency of Social Gatherings with Friends and Family: Not on file    Attends [employer] Services: Not on file    Active Member of [address] [place] [address] [employer] or Organizations: Not on file    Attends Club or Organization Meetings: Not on file    Marital Status: Not on file   Intimate Partner Violence:     Fear of Current or Ex-Partner: Not on file    Emotionally Abused: Not on file    Physically Abused: Not on file    Sexually Abused: Not on file   Housing Stability:     Unable to Pay for Housing in the Last Year: Not on file    Number of [place] in the Last Year: Not on file    Unstable Housing in the Last Year: Not on file     History reviewed. No pertinent family history.   Allergies   Allergen Reactions    Pcn [Penicillins]     Vesicare [Solifenacin]      Current Outpatient Medications on File Prior to Visit   Medication Sig Dispense Refill    QUEtiapine (SEROQUEL) 25 MG tablet Take 12.5 mg by mouth 2 times daily      guaiFENesin (ROBITUSSIN) 100 MG/5ML syrup Take 200 mg by mouth 4 times daily as needed for Cough      aspirin 325 MG tablet Take 325 mg by mouth daily      propranolol (INDERAL) 20 MG tablet Take 20 mg by mouth nightly      donepezil (ARICEPT) 5 MG tablet Take 5 mg by mouth nightly      diclofenac sodium (VOLTAREN) 1 % GEL Apply 2 g topically 4 times daily To affected shoulders 240 g 5    acetaminophen (TYLENOL) 500 MG tablet Take 500 mg by mouth 2 times daily      diltiazem (DILACOR XR) 240 MG extended release capsule Take 240 mg by mouth daily      docusate sodium (COLACE) 100 MG capsule Take 100 mg by mouth 2 times daily      hydrochlorothiazide (HYDRODIURIL) 12.5 MG tablet Take 12.5 mg by mouth daily      meclizine (ANTIVERT) 25 MG tablet Take 25 mg by mouth 2 times daily       potassium chloride (KLOR-CON M) 10 MEQ extended release tablet Take 10 mEq by mouth daily      Cholecalciferol (VITAMIN D) 2000 units CAPS capsule Take 2,000 Units by mouth daily       No current facility-administered medications on file prior to visit. Review of Systems   Constitutional: Positive for fatigue. Negative for activity change, appetite change, chills, diaphoresis, fever and unexpected weight change. HENT: Negative for drooling, hearing loss, mouth sores, sore throat, trouble swallowing and voice change. Eyes: Negative for discharge and visual disturbance. Respiratory: Negative for apnea, cough, choking, chest tightness, shortness of breath, wheezing and stridor. Cardiovascular: Negative for chest pain, palpitations and leg swelling. Gastrointestinal: Negative for abdominal distention, abdominal pain, anal bleeding, blood in stool, constipation, diarrhea, nausea, rectal pain and vomiting. Genitourinary: Negative for difficulty urinating, dysuria, enuresis, frequency and hematuria. Musculoskeletal: Positive for arthralgias, back pain and gait problem. Negative for joint swelling and myalgias. Skin: Negative for color change, pallor, rash and wound. Allergic/Immunologic: Negative for food allergies and immunocompromised state. Neurological: Negative for dizziness, tremors, seizures, syncope, facial asymmetry, speech difficulty, weakness, light-headedness, numbness and headaches. Hematological: Negative for adenopathy. Does not bruise/bleed easily. Psychiatric/Behavioral: Negative for agitation, behavioral problems, confusion, decreased concentration, dysphoric mood, hallucinations, self-injury, sleep disturbance and suicidal ideas. The patient is not nervous/anxious and is not hyperactive. Objective: There were no vitals taken for this visit. Physical Exam  Constitutional:       General: She is not in acute distress. Appearance: She is well-developed. She is not diaphoretic. HENT:      Head: Normocephalic and atraumatic. Right Ear: External ear normal.      Left Ear: External ear normal.      Nose: Nose normal.      Mouth/Throat:      Pharynx: No oropharyngeal exudate. Eyes:      General: No scleral icterus. Right eye: No discharge. Left eye: No discharge. Conjunctiva/sclera: Conjunctivae normal.      Pupils: Pupils are equal, round, and reactive to light. Neck:      Thyroid: No thyromegaly. Vascular: No JVD. Trachea: No tracheal deviation. Cardiovascular:      Rate and Rhythm: Normal rate and regular rhythm. Heart sounds: Normal heart sounds. Pulmonary:      Effort: Pulmonary effort is normal. No respiratory distress. Breath sounds: No stridor. Decreased breath sounds present. No wheezing or rales. Chest:      Chest wall: No tenderness. Abdominal:      General: Bowel sounds are normal. There is no distension. Palpations: Abdomen is soft. There is no mass. Tenderness:  There is no abdominal tenderness. There is no guarding or rebound. Musculoskeletal:         General: No tenderness or deformity. Normal range of motion. Cervical back: Normal range of motion and neck supple. Lymphadenopathy:      Cervical: No cervical adenopathy. Skin:     General: Skin is warm and dry. Capillary Refill: Capillary refill takes less than 2 seconds. Findings: No erythema or rash. Neurological:      Mental Status: She is alert. Mental status is at baseline. Psychiatric:         Attention and Perception: Attention normal.         Mood and Affect: Mood normal.         Speech: Speech normal.         Behavior: Behavior normal.         Thought Content: Thought content normal.         Cognition and Memory: Memory is impaired. Assessment:       Diagnosis Orders   1. Osteoarthritis, unspecified osteoarthritis type, unspecified site  traMADol (ULTRAM) 50 MG tablet   2. Palliative care encounter  traMADol (ULTRAM) 50 MG tablet   3. Chronic pain of both shoulders  traMADol (ULTRAM) 50 MG tablet   4. Primary osteoarthritis involving multiple joints     5. Arthritis     6. Late onset Alzheimer's disease with behavioral disturbance ([facility]            Plan:      No orders of the defined types were placed in this encounter. Orders Placed This Encounter   Medications    traMADol (ULTRAM) 50 MG tablet     Sig: Take 1 tablet by mouth every 12 hours as needed for Pain for up to 30 days. Dispense:  60 tablet     Refill:  0     Reduce doses taken as pain becomes manageable       No follow-ups on file.     JESSENIA Paz - CNP

## 2021-12-01 ENCOUNTER — OFFICE VISIT (OUTPATIENT)
Dept: GERIATRIC MEDICINE | Age: 86
End: 2021-12-01
Payer: MEDICARE

## 2021-12-01 DIAGNOSIS — G30.1 LATE ONSET ALZHEIMER'S DISEASE WITH BEHAVIORAL DISTURBANCE (HCC): ICD-10-CM

## 2021-12-01 DIAGNOSIS — I38 HEART VALVE DISEASE: Primary | ICD-10-CM

## 2021-12-01 DIAGNOSIS — I48.20 CHRONIC ATRIAL FIBRILLATION (HCC): ICD-10-CM

## 2021-12-01 DIAGNOSIS — F02.818 LATE ONSET ALZHEIMER'S DISEASE WITH BEHAVIORAL DISTURBANCE (HCC): ICD-10-CM

## 2021-12-01 DIAGNOSIS — H04.123 DRY EYE SYNDROME OF BILATERAL LACRIMAL GLANDS: ICD-10-CM

## 2021-12-06 NOTE — PROGRESS NOTES
[facility]  [address]      Assisted living apartment    12/1/2021    Marcela Malik  is a 80 y.o. in the NF being seen for a f/u of   Chief Complaint   Patient presents with    3 Month Follow-Up     dementia with delusions       Spoke to nursing and ancillary staff regarding patient status, present state of health. Need for nursing care and assistance. Reviewed chart, labs, medications in NF chart and allergies. Aging in place at this time. HPI lives in  assisted living. Being seen for chronic illnesses of dementia with delusions, heart disease, dry eye with excoriation. Reports from nurse, patient still rubbing her eyes and began to rise with unclean hands and she continues to infect her eyes she ends up with conjunctivitis and corneal abrasions at times she does respond however fairly well to antibiotics  With her dementia and forgetfulness she cannot remember not to bother her eyes   pt is at baseline, no change in vision, hearing. No headaches or choking issues. Uses walker or  wheel chair for locomotion in facility. No decrease in appetite, no change in B/B habits. No pain crises. NO fevers. Can make needs known to nurse. Seen monthly by palliative for her delusions behaviors    Past Medical History:   Diagnosis Date    Benign essential HTN 11/17/2019    Constipation 7/26/2016    Detrusor instability 11/17/2019    Dyslipidemia 11/17/2019    Heart valve disease 11/17/2019    Longstanding persistent atrial fibrillation ([facility] [address] 11/17/2019    Primary osteoarthritis involving multiple joints 11/17/2019     No past surgical history on file. No family history on file. Social History     Socioeconomic History    Marital status:       Spouse name: Not on file    Number of children: Not on file    Years of education: Not on file    Highest education level: Not on file   Occupational History    Not on file   Tobacco Use    Smoking status: Never Smoker    Smokeless tobacco: Never Used   Substance and Sexual Activity    Alcohol use: Not on file    Drug use: Not on file    Sexual activity: Not on file   Other Topics Concern    Not on file   Social History Narrative    Not on file     Social Determinants of Health     Financial Resource Strain:     Difficulty of Paying Living Expenses: Not on file   Food Insecurity:     Worried About Running Out of Food in the Last Year: Not on file    Rory of Food in the Last Year: Not on file   Transportation Needs:     Lack of Transportation (Medical): Not on file    Lack of Transportation (Non-Medical): Not on file   Physical Activity:     Days of Exercise per Week: Not on file    Minutes of Exercise per Session: Not on file   Stress:     Feeling of Stress : Not on file   Social Connections:     Frequency of Communication with Friends and Family: Not on file    Frequency of Social Gatherings with Friends and Family: Not on file    Attends Congregational Services: Not on file    Active Member of [address] [place] [address] or Organizations: Not on file    Attends Club or Organization Meetings: Not on file    Marital Status: Not on file   Intimate Partner Violence:     Fear of Current or Ex-Partner: Not on file    Emotionally Abused: Not on file    Physically Abused: Not on file    Sexually Abused: Not on file   Housing Stability:     Unable to Pay for Housing in the Last Year: Not on file    Number of [place] in the Last Year: Not on file    Unstable Housing in the Last Year: Not on file       Allergies: Pcn [penicillins] and Vesicare [solifenacin]  NF MEDICATIONS REVIEWED    ROS:  See HPI  Constitutional: There are no reports of worsening behavioral issues, change in appetite, fever, or weakness. No gross bleeding issues. When has  difficulty ambulating to meals or activities will use wheel chair.   Respiratory: denies SOB, dyspnea,   Cardiovascular: denies CP, lightheadedness,   GI: No reports related to the system, including grammar, punctuation and spelling as well as words and phrases that may seem inaccurate.   For any questions or concerns feel free to contact me for clarification

## 2022-01-02 LAB
ALBUMIN SERPL-MCNC: 3 G/DL (ref 3.5–4.6)
ALP BLD-CCNC: 63 U/L (ref 40–130)
ALT SERPL-CCNC: 6 U/L (ref 0–33)
ANION GAP SERPL CALCULATED.3IONS-SCNC: 11 MEQ/L (ref 9–15)
AST SERPL-CCNC: 9 U/L (ref 0–35)
BASOPHILS ABSOLUTE: 0 K/UL (ref 0–0.2)
BASOPHILS RELATIVE PERCENT: 0.3 %
BILIRUB SERPL-MCNC: <0.2 MG/DL (ref 0.2–0.7)
BILIRUBIN DIRECT: <0.2 MG/DL (ref 0–0.4)
BILIRUBIN, INDIRECT: ABNORMAL MG/DL (ref 0–0.6)
BUN BLDV-MCNC: 18 MG/DL (ref 8–23)
C-REACTIVE PROTEIN: <3 MG/L (ref 0–5)
CALCIUM SERPL-MCNC: 8.5 MG/DL (ref 8.5–9.9)
CHLORIDE BLD-SCNC: 102 MEQ/L (ref 95–107)
CO2: 29 MEQ/L (ref 20–31)
CREAT SERPL-MCNC: 1.3 MG/DL (ref 0.5–0.9)
D DIMER: 0.67 MG/L FEU (ref 0–0.5)
EOSINOPHILS ABSOLUTE: 0.5 K/UL (ref 0–0.7)
EOSINOPHILS RELATIVE PERCENT: 11.2 %
GFR AFRICAN AMERICAN: 46
GFR NON-AFRICAN AMERICAN: 38
GLUCOSE BLD-MCNC: 129 MG/DL (ref 70–99)
HCT VFR BLD CALC: 22.5 % (ref 37–47)
HEMOGLOBIN: 7.3 G/DL (ref 12–16)
LACTATE DEHYDROGENASE: 150 U/L (ref 135–214)
LYMPHOCYTES ABSOLUTE: 0.9 K/UL (ref 1–4.8)
LYMPHOCYTES RELATIVE PERCENT: 20.6 %
MCH RBC QN AUTO: 29.3 PG (ref 27–31.3)
MCHC RBC AUTO-ENTMCNC: 32.5 % (ref 33–37)
MCV RBC AUTO: 90.3 FL (ref 82–100)
MONOCYTES ABSOLUTE: 0.3 K/UL (ref 0.2–0.8)
MONOCYTES RELATIVE PERCENT: 5.6 %
NEUTROPHILS ABSOLUTE: 2.8 K/UL (ref 1.4–6.5)
NEUTROPHILS RELATIVE PERCENT: 62.3 %
PDW BLD-RTO: 14.1 % (ref 11.5–14.5)
PLATELET # BLD: 187 K/UL (ref 130–400)
POTASSIUM SERPL-SCNC: 4.7 MEQ/L (ref 3.4–4.9)
PROCALCITONIN: 0.1 NG/ML (ref 0–0.15)
RBC # BLD: 2.49 M/UL (ref 4.2–5.4)
SODIUM BLD-SCNC: 142 MEQ/L (ref 135–144)
TOTAL PROTEIN: 5.6 G/DL (ref 6.3–8)
TROPONIN: 0.06 NG/ML (ref 0–0.01)
WBC # BLD: 4.5 K/UL (ref 4.8–10.8)

## 2022-01-03 ENCOUNTER — OFFICE VISIT (OUTPATIENT)
Dept: GERIATRIC MEDICINE | Age: 87
End: 2022-01-03
Payer: MEDICARE

## 2022-01-03 DIAGNOSIS — U07.1 COVID-19: Primary | ICD-10-CM

## 2022-01-03 LAB — FERRITIN: 153 UG/L (ref 13–150)

## 2022-01-03 PROCEDURE — 99304 1ST NF CARE SF/LOW MDM 25: CPT | Performed by: INTERNAL MEDICINE

## 2022-01-04 ENCOUNTER — VIRTUAL VISIT (OUTPATIENT)
Dept: GERIATRIC MEDICINE | Age: 87
End: 2022-01-04
Payer: MEDICARE

## 2022-01-04 DIAGNOSIS — F02.818 LATE ONSET ALZHEIMER'S DISEASE WITH BEHAVIORAL DISTURBANCE (HCC): ICD-10-CM

## 2022-01-04 DIAGNOSIS — G30.1 LATE ONSET ALZHEIMER'S DISEASE WITH BEHAVIORAL DISTURBANCE (HCC): ICD-10-CM

## 2022-01-04 DIAGNOSIS — R63.0 DECREASED APPETITE: ICD-10-CM

## 2022-01-04 DIAGNOSIS — U07.1 COVID-19: Primary | ICD-10-CM

## 2022-01-04 LAB
ANION GAP SERPL CALCULATED.3IONS-SCNC: 12 MEQ/L (ref 9–15)
BASOPHILS ABSOLUTE: 0 K/UL (ref 0–0.2)
BASOPHILS RELATIVE PERCENT: 0.4 %
BUN BLDV-MCNC: 27 MG/DL (ref 8–23)
C-REACTIVE PROTEIN: <3 MG/L (ref 0–5)
CALCIUM SERPL-MCNC: 9 MG/DL (ref 8.5–9.9)
CHLORIDE BLD-SCNC: 101 MEQ/L (ref 95–107)
CO2: 29 MEQ/L (ref 20–31)
CREAT SERPL-MCNC: 0.75 MG/DL (ref 0.5–0.9)
D DIMER: 0.37 MG/L FEU (ref 0–0.5)
EOSINOPHILS ABSOLUTE: 0.1 K/UL (ref 0–0.7)
EOSINOPHILS RELATIVE PERCENT: 1.3 %
GFR AFRICAN AMERICAN: >60
GFR NON-AFRICAN AMERICAN: >60
GLUCOSE BLD-MCNC: 93 MG/DL (ref 70–99)
HCT VFR BLD CALC: 37.3 % (ref 37–47)
HEMOGLOBIN: 12.6 G/DL (ref 12–16)
LYMPHOCYTES ABSOLUTE: 1.6 K/UL (ref 1–4.8)
LYMPHOCYTES RELATIVE PERCENT: 18.9 %
MCH RBC QN AUTO: 32 PG (ref 27–31.3)
MCHC RBC AUTO-ENTMCNC: 33.9 % (ref 33–37)
MCV RBC AUTO: 94.5 FL (ref 82–100)
MONOCYTES ABSOLUTE: 0.8 K/UL (ref 0.2–0.8)
MONOCYTES RELATIVE PERCENT: 8.7 %
NEUTROPHILS ABSOLUTE: 6.1 K/UL (ref 1.4–6.5)
NEUTROPHILS RELATIVE PERCENT: 70.7 %
PDW BLD-RTO: 14.1 % (ref 11.5–14.5)
PLATELET # BLD: 227 K/UL (ref 130–400)
POTASSIUM SERPL-SCNC: 3.3 MEQ/L (ref 3.4–4.9)
PROCALCITONIN: 0.08 NG/ML (ref 0–0.15)
RBC # BLD: 3.95 M/UL (ref 4.2–5.4)
SODIUM BLD-SCNC: 142 MEQ/L (ref 135–144)
WBC # BLD: 8.7 K/UL (ref 4.8–10.8)

## 2022-01-04 PROCEDURE — 99309 SBSQ NF CARE MODERATE MDM 30: CPT | Performed by: NURSE PRACTITIONER

## 2022-01-05 LAB — FERRITIN: 176 UG/L (ref 13–150)

## 2022-01-06 ENCOUNTER — OFFICE VISIT (OUTPATIENT)
Dept: GERIATRIC MEDICINE | Age: 87
End: 2022-01-06
Payer: MEDICARE

## 2022-01-06 DIAGNOSIS — U07.1 COVID-19: Primary | ICD-10-CM

## 2022-01-06 PROCEDURE — 99309 SBSQ NF CARE MODERATE MDM 30: CPT | Performed by: INTERNAL MEDICINE

## 2022-01-07 ENCOUNTER — VIRTUAL VISIT (OUTPATIENT)
Dept: GERIATRIC MEDICINE | Age: 87
End: 2022-01-07
Payer: MEDICARE

## 2022-01-07 DIAGNOSIS — R63.0 DECREASED APPETITE: ICD-10-CM

## 2022-01-07 DIAGNOSIS — F02.818 LATE ONSET ALZHEIMER'S DISEASE WITH BEHAVIORAL DISTURBANCE (HCC): ICD-10-CM

## 2022-01-07 DIAGNOSIS — U07.1 COVID-19: Primary | ICD-10-CM

## 2022-01-07 DIAGNOSIS — G30.1 LATE ONSET ALZHEIMER'S DISEASE WITH BEHAVIORAL DISTURBANCE (HCC): ICD-10-CM

## 2022-01-07 LAB
IRON SATURATION: 29 % (ref 20–55)
IRON: 61 UG/DL (ref 37–145)
TOTAL IRON BINDING CAPACITY: 208 UG/DL (ref 250–450)
UNSATURATED IRON BINDING CAPACITY: 147 UG/DL (ref 112–347)

## 2022-01-07 PROCEDURE — 99308 SBSQ NF CARE LOW MDM 20: CPT | Performed by: NURSE PRACTITIONER

## 2022-01-08 LAB
BILIRUBIN URINE: NEGATIVE
BLOOD, URINE: NEGATIVE
CLARITY: CLEAR
COLOR: YELLOW
GLUCOSE URINE: NEGATIVE MG/DL
KETONES, URINE: ABNORMAL MG/DL
LEUKOCYTE ESTERASE, URINE: NEGATIVE
NITRITE, URINE: NEGATIVE
PH UA: 5 (ref 5–9)
PROTEIN UA: NEGATIVE MG/DL
SPECIFIC GRAVITY UA: 1.02 (ref 1–1.03)
UROBILINOGEN, URINE: 0.2 E.U./DL

## 2022-01-09 RX ORDER — FAMOTIDINE 20 MG/1
20 TABLET, FILM COATED ORAL EVERY MORNING
COMMUNITY
End: 2022-04-05

## 2022-01-09 RX ORDER — POLYETHYLENE GLYCOL 400 AND PROPYLENE GLYCOL 4; 3 MG/ML; MG/ML
1 SOLUTION/ DROPS OPHTHALMIC 2 TIMES DAILY
COMMUNITY

## 2022-01-09 RX ORDER — ASCORBIC ACID 500 MG
500 TABLET ORAL 2 TIMES DAILY
COMMUNITY
End: 2022-03-01

## 2022-01-09 RX ORDER — ALENDRONATE SODIUM 70 MG/1
70 TABLET ORAL
COMMUNITY

## 2022-01-09 RX ORDER — FERROUS SULFATE 325(65) MG
325 TABLET ORAL 2 TIMES DAILY
COMMUNITY
End: 2022-04-05

## 2022-01-09 RX ORDER — CHOLECALCIFEROL (VITAMIN D3) 125 MCG
5 CAPSULE ORAL 2 TIMES DAILY
COMMUNITY
End: 2022-04-05

## 2022-01-09 RX ORDER — SILICONE ADHESIVE 1.5" X 3"
1 SHEET (EA) TOPICAL EVERY 4 HOURS
COMMUNITY

## 2022-01-09 RX ORDER — NYSTATIN 100000 [USP'U]/G
POWDER TOPICAL DAILY PRN
COMMUNITY

## 2022-01-09 RX ORDER — SODIUM CHLORIDE 5 %
1 OINTMENT (GRAM) OPHTHALMIC (EYE) DAILY PRN
COMMUNITY

## 2022-01-09 RX ORDER — PHENOL 1.4 %
2 AEROSOL, SPRAY (ML) MUCOUS MEMBRANE DAILY
COMMUNITY

## 2022-01-09 RX ORDER — FUROSEMIDE 20 MG/1
20 TABLET ORAL 2 TIMES DAILY
COMMUNITY
End: 2022-02-04

## 2022-01-09 RX ORDER — ZINC GLUCONATE 50 MG
50 TABLET ORAL DAILY
COMMUNITY
End: 2022-03-01 | Stop reason: ALTCHOICE

## 2022-01-10 PROBLEM — R63.0 DECREASED APPETITE: Status: ACTIVE | Noted: 2022-01-10

## 2022-01-10 PROBLEM — U07.1 COVID-19: Status: ACTIVE | Noted: 2022-01-10

## 2022-01-10 LAB — URINE CULTURE, ROUTINE: NORMAL

## 2022-01-11 LAB
ANION GAP SERPL CALCULATED.3IONS-SCNC: 18 MEQ/L (ref 9–15)
BASOPHILS ABSOLUTE: 0.1 K/UL (ref 0–0.2)
BASOPHILS RELATIVE PERCENT: 0.6 %
BUN BLDV-MCNC: 48 MG/DL (ref 8–23)
C-REACTIVE PROTEIN: <3 MG/L (ref 0–5)
CALCIUM SERPL-MCNC: 9.3 MG/DL (ref 8.5–9.9)
CHLORIDE BLD-SCNC: 102 MEQ/L (ref 95–107)
CO2: 26 MEQ/L (ref 20–31)
CREAT SERPL-MCNC: 1.17 MG/DL (ref 0.5–0.9)
D DIMER: 0.84 MG/L FEU (ref 0–0.5)
EOSINOPHILS ABSOLUTE: 0.2 K/UL (ref 0–0.7)
EOSINOPHILS RELATIVE PERCENT: 1.9 %
GFR AFRICAN AMERICAN: 51.9
GFR NON-AFRICAN AMERICAN: 42.9
GLUCOSE BLD-MCNC: 103 MG/DL (ref 70–99)
HCT VFR BLD CALC: 40.5 % (ref 37–47)
HEMOGLOBIN: 13.6 G/DL (ref 12–16)
LYMPHOCYTES ABSOLUTE: 2 K/UL (ref 1–4.8)
LYMPHOCYTES RELATIVE PERCENT: 21.8 %
MCH RBC QN AUTO: 31.7 PG (ref 27–31.3)
MCHC RBC AUTO-ENTMCNC: 33.5 % (ref 33–37)
MCV RBC AUTO: 94.5 FL (ref 82–100)
MONOCYTES ABSOLUTE: 1 K/UL (ref 0.2–0.8)
MONOCYTES RELATIVE PERCENT: 11.2 %
NEUTROPHILS ABSOLUTE: 5.9 K/UL (ref 1.4–6.5)
NEUTROPHILS RELATIVE PERCENT: 64.5 %
PDW BLD-RTO: 14 % (ref 11.5–14.5)
PLATELET # BLD: 245 K/UL (ref 130–400)
POTASSIUM SERPL-SCNC: 2.5 MEQ/L (ref 3.4–4.9)
PROCALCITONIN: 0.09 NG/ML (ref 0–0.15)
RBC # BLD: 4.29 M/UL (ref 4.2–5.4)
SODIUM BLD-SCNC: 146 MEQ/L (ref 135–144)
WBC # BLD: 9.1 K/UL (ref 4.8–10.8)

## 2022-01-11 NOTE — PROGRESS NOTES
Name: Rekha Youngeritis   Facility: [facility]  Rich Tran  Date: 2022     Chief Complaint   Patient presents with    Covid Testing     Positive       HPI:    Rekha Malik (:  1926) has requested an evaluation for the following concern(s):    PREDZ-24, decreased appetite, and dementia    Pt/Nurse noted that resident tested positive for COVID-19 on 2021. Resident was transferred to [facility] from [facility] assisted living, resident was taken to the Covid unit and remains in isolation. Resident is lying in bed, well-developed, no acute distress. She is sleeping, easily arousable, cooperative with care. Nurse states that she has been sleeping a lot in the last 24 to 48 hours, has not been eating much, not drinking much. She has a history of dementia, no behaviors observed, is oriented to person, hard of hearing, does answer some simple yes/no questions appropriately. When asked resident denies symptoms of COVID-19 such as cough congestion sore throat fever chills headache lightheadedness dizziness loss of sense of taste or smell. She does have a history of chronic pain, takes tramadol and applies Voltaren gel to the affected site. Resident denies pain during this video evaluation, nurse states she denies pain this morning. PT and OT consulted as needed to maintain baseline level of function as staff reports she is becoming weak. Nursing staff report labs today, CBC with differential is normal, CRP is less than 3.0 which is normal, BMP is normal except potassium is 3.3. D-dimer was elevated and will initiate Lovenox daily. Chest x-ray done 1/3/2021 reveals clear lungs and mildly enlarged heart. Lung sounds are clear, SPO2 is 95% on room air, no O2 worn. Vital signs stable no fever. Continue to monitor and follow current plan of care.       Review of Systems  ROS: Nurse/pt reports   Constitutional: There are no reports of behavioral issues,   Had no decrease in appetite, no low grade fever, is now weaker. Had no runny nose, sore throat, or cough. No c/o loss of taste or smell,  Respiratory: No c/o SOB, dyspnea, has dyspnea on exertion and change in exercise capacity  Cardiovascular: denies CP, lightheadedness, palpitations, PND, orthopnea, or claudication. GI: No N/V/D. : no reports of dysuria  Extremities: No reports of pain issues, no edema     Past Medical History:   Diagnosis Date    Benign essential HTN 11/17/2019    Constipation 7/26/2016    Detrusor instability 11/17/2019    Dyslipidemia 11/17/2019    Heart valve disease 11/17/2019    Longstanding persistent atrial fibrillation ([facility] [address] 11/17/2019    Primary osteoarthritis involving multiple joints 11/17/2019       Prior to Visit Medications    Medication Sig Taking?  Authorizing Provider   ascorbic acid (VITAMIN C) 500 MG tablet Take 500 mg by mouth 2 times daily Indications: Coronavirus  Historical Provider, MD   alendronate (FOSAMAX) 70 MG tablet Take 70 mg by mouth every 7 days Indications: Osteoporosis  Historical Provider, MD Gagandeep Iniguez, OINT Place 1 each into the right eye nightly 1/4\" strip  Historical Provider, MD   calcium carbonate 600 MG TABS tablet Take 2 tablets by mouth daily Indications: Nutritional Support  Historical Provider, MD   furosemide (LASIX) 20 MG tablet Take 20 mg by mouth 2 times daily Indications: High Blood Pressure Disorder  Historical Provider, MD   ferrous sulfate (IRON 325) 325 (65 Fe) MG tablet Take 325 mg by mouth 2 times daily Indications: Anemia  Historical Provider, MD   melatonin 5 MG TABS tablet Take 5 mg by mouth 2 times daily Indications: Coronavirus  Historical Provider, MD   PSYLLIUM PO Take 15 g by mouth daily as needed  Historical Provider, MD   sodium chloride (CHELO 128) 5 % ophthalmic solution 1 drop every 4 hours  Historical Provider, MD   sodium chloride 5 % ophthalmic ointment Place 1 drop into the right eye daily as needed Indications: Dry Eyes Lower eye lid  Historical Provider, MD   nystatin (MYCOSTATIN) 980130 UNIT/GM powder Apply topically daily as needed Indications: Fungal Dermatitis Apply to right Axilla  Historical Provider, MD   famotidine (PEPCID) 20 MG tablet Take 20 mg by mouth every morning Indications: Coronavirus  Historical Provider, MD   polyethyl glycol-propyl glycol 0.4-0.3 % (SYSTANE) 0.4-0.3 % ophthalmic solution Place 1 drop into both eyes 2 times daily Indications: Dry Eyes  Historical Provider, MD   zinc 50 MG TABS tablet Take 50 mg by mouth daily Indications: Coronavirus  Historical Provider, MD   traMADol (ULTRAM) 50 MG tablet Take 1 tablet by mouth every 12 hours as needed for Pain for up to 30 days.   JESSENIA Villatoro CNP   QUEtiapine (SEROQUEL) 25 MG tablet Take 12.5 mg by mouth 2 times daily Indications: Behavioral Disorders associated with Dementia 0.5 tab=12.5mg  Historical Provider, MD   guaiFENesin (ROBITUSSIN) 100 MG/5ML syrup Take 200 mg by mouth 4 times daily as needed for Cough  Historical Provider, MD   aspirin 325 MG tablet Take 325 mg by mouth daily Indications: Preventative Treatment   Historical Provider, MD   propranolol (INDERAL) 20 MG tablet Take 20 mg by mouth nightly Indications: High Blood Pressure Disorder, Involuntary Quivering   Historical Provider, MD   diclofenac sodium (VOLTAREN) 1 % GEL Apply 2 g topically 4 times daily To affected shoulders  Patient taking differently: Apply 2 g topically daily Indications: Pain associated with Arthritis To affected shoulders  JESSENIA Villatoor CNP   acetaminophen (TYLENOL) 500 MG tablet Take 500 mg by mouth 2 times daily Indications: Pain   Historical Provider, MD   diltiazem (DILACOR XR) 240 MG extended release capsule Take 240 mg by mouth daily Indications: High Blood Pressure Disorder   Historical Provider, MD   docusate sodium (COLACE) 100 MG capsule Take 100 mg by mouth 2 times daily Indications: Constipation   Historical Provider, MD   hydrochlorothiazide (HYDRODIURIL) 12.5 MG tablet Take 25 mg by mouth daily Indications: Congestive Heart Failure   Historical Provider, MD   meclizine (ANTIVERT) 25 MG tablet Take 12.5 mg by mouth three times daily 0.5 tabs  Historical Provider, MD   potassium chloride (KLOR-CON M) 10 MEQ extended release tablet Take 10 mEq by mouth daily Indications: Nutritional Support   Historical Provider, MD   Cholecalciferol (VITAMIN D) 2000 units CAPS capsule Take 2,000 Units by mouth 2 times daily Indications: Coronavirus, Nutritional Support   Historical Provider, MD       Social History     Tobacco Use    Smoking status: Never Smoker    Smokeless tobacco: Never Used   Substance Use Topics    Alcohol use: Not on file    Drug use: Not on file          PHYSICAL EXAMINATION:    Vital Signs blood pressure 113/60 heart rate 65 temp 97.2 respiratory rate 18 O2 sats are 95% on room air    [ INSTRUCTIONS:  \"[x]\" Indicates a positive item  \"[]\" Indicates a negative item]      Constitutional: [x] Appears well-developed and well-nourished [x] No apparent distress      [] Abnormal- THIN , FRAIL, CACHEXIA NOTED  Mental status  [] Alert and awake  [x] Oriented to person [x]Able to follow commands     PLEASANTLY CONFUSED AT BASELINE  Lethargic   Speech is clear, and appropriate     Eyes:  EOM    [x]  Normal  [] Abnormal-  Sclera  [x]  Normal  [] Abnormal -         Discharge [x]  None visible  [] Abnormal -    HENT:   [x] Normocephalic, atraumatic.   [] Abnormal   [x] Mouth/Throat: Mucous membranes are moist.     External Ears [x] Normal  [] Abnormal-     Neck: [x] No visualized mass     Pulmonary/Chest: [x] Chest excursion symmetrical  Heart sounds    regular per nurse  Lungs  sounds   clear per nurse   [x] Respiratory effort normal.  [x] No visualized signs of difficulty breathing or respiratory distress        [] Abnormal-      Musculoskeletal:   [] Normal gait with no signs of ataxia         [x] Normal range of motion of neck                [x] Motor grossly intact in visible upper extremities          [x] Motor grossly intact in visible lower extremities        [] Abnormal-         [x] LIMITED ROM AT BASELINE     Neurological:        [x] No Facial Asymmetry (Cranial nerve 7 motor function) (limited exam to video visit)          [x] No gaze palsy        [] Abnormal-         Skin:        [x] No significant exanthematous lesions or discoloration noted on facial skin         [] Abnormal- rash visible on skin           Psychiatric:          [x] Normal Affect [x] No Hallucinations []  Normal Mood                              [] Depressed appearing  [x] Confused appearing  [] Anxious appearing   []      NORMAL THOUGHT CONTENT, GOOD JUDGEMENT  [x] Abnormal-oriented x1         Other pertinent observable physical exam findings- [x] Abdomen soft, round, non-tender    Reviewed labs:  Yes    CBC  Lab Results   Component Value Date    WBC 8.7 01/04/2022    RBC 3.95 01/04/2022    HGB 12.6 01/04/2022    HCT 37.3 01/04/2022    MCV 94.5 01/04/2022    MCH 32.0 01/04/2022    MCHC 33.9 01/04/2022    RDW 14.1 01/04/2022     01/04/2022      BMP  Lab Results   Component Value Date     01/04/2022    K 3.3 01/04/2022     01/04/2022    CO2 29 01/04/2022    BUN 27 01/04/2022    CREATININE 0.75 01/04/2022    GLUCOSE 93 01/04/2022    CALCIUM 9.0 01/04/2022        Lab Results   Component Value Date    CRP <3.0 01/04/2022     Lab Results   Component Value Date    FERRITIN 176 (H) 01/03/2022     Lab Results   Component Value Date    CKTOTAL 26 01/22/2013    CKMB 0.8 01/21/2013    TROPONINI 0.057 ([place]) 01/02/2022     Lab Results   Component Value Date    DDIMER 0.37 01/04/2022     Lab Results   Component Value Date    CKTOTAL 26 01/22/2013       Please refer to on site chart for most recent chest Xray result, reviewed at this time. Diagnosis Orders   1. COVID-19     2.  Decreased appetite 3. Late onset Alzheimer's disease with behavioral disturbance ([facility] [address]         ASSESSMENT/PLAN:      1. COVID-19  Continue Covid labs and medications. Encourage p.o. intake. PT/OT consult as needed to maintain baseline level of function  Respiratory Therapy Consult  O2 at 2 L per nasal cannula to maintain SPO2 greater than 94%    Standing orders for COVID -19 labs on diagnosis AND q week:  CBC  BMP  LDH  FERRITIN  PROCALCITONIN  CRP  D-DIMER  TROPONIN  CPK    CXR ON DIAGNOSIS    medications:  Vit D3 1000 units po bid  VIt C 500mg po bid  Zinc 50mg po daily  Pepcid 20mg po daily IF NOT on PPI  Lovenox 40 mg sq q HS    Patient does not require Dexamethasone 6mg PO QD  Patient does not  require antibiotics for concomitant bacterial pneumonia. Patient does not qualify for monoclonal antibodies. 2.  Decreased appetite  Encourage p.o. intake  Dietitian consult    3. Alzheimer's disease with behaviors  No behaviors, continue Seroquel as ordered. Continue same meds and POC      Dada Malik is a 80 y.o. female being evaluated by a Virtual Visit (video visit) encounter to address concerns as mentioned above. A caregiver was present when appropriate. Due to this being a TeleHealth encounter ([facility]), evaluation of the following organ systems was limited: Vitals/Constitutional/EENT/Resp/CV/GI//MS/Neuro/Skin/Heme-Lymph-Imm. Pursuant to the emergency declaration under the [facility], [address] [place] [address] authority and the [employer] and [employer] Act, this Virtual Visit was conducted with patient's (and/or legal guardian's) consent, to reduce the patient's risk of exposure to COVID-19 and provide necessary medical care.   The patient (and/or legal guardian) has also been advised to contact this office for worsening conditions or problems, and seek emergency medical treatment and/or call 911 if deemed necessary. Patient identification was verified at the start of the visit: Yes    Total time spent on this encounter: 25 minutes greater than 50% of the visit spent with counseling and coordination of care. Resident has dementia therefore more time required to explain diagnostics, medications, diet, activities, plan of care. Services were provided through a video synchronous discussion virtually to substitute for in-person clinic visit, and they agree to continue with assistance of NF staff and via VIRTUAL platform Patient and provider were located at their individual homes. --JESSENIA Foley - CNP on 1/10/2022 at 9:36 PM    An electronic signature was used to authenticate this note. Please note this report is partially produced by using speech recognition hardware. It may contain errors related to the system, including grammar, punctuation and spelling as well as words and phrases that may seem inaccurate.   For any questions or concerns feel free to contact me for clarification

## 2022-01-12 ENCOUNTER — VIRTUAL VISIT (OUTPATIENT)
Dept: GERIATRIC MEDICINE | Age: 87
End: 2022-01-12
Payer: MEDICARE

## 2022-01-12 DIAGNOSIS — E87.6 HYPOKALEMIA: ICD-10-CM

## 2022-01-12 DIAGNOSIS — R53.1 WEAK: ICD-10-CM

## 2022-01-12 DIAGNOSIS — U07.1 COVID: Primary | ICD-10-CM

## 2022-01-12 DIAGNOSIS — R63.0 ANOREXIA: ICD-10-CM

## 2022-01-13 LAB
BASOPHILS ABSOLUTE: 0.1 /ΜL
BASOPHILS RELATIVE PERCENT: 0.8 %
BUN BLDV-MCNC: 46 MG/DL
CALCIUM SERPL-MCNC: 9.9 MG/DL
CHLORIDE BLD-SCNC: 102 MMOL/L
CO2: 33 MMOL/L
CREAT SERPL-MCNC: 1 MG/DL
EOSINOPHILS ABSOLUTE: 0.2 /ΜL
EOSINOPHILS RELATIVE PERCENT: 2.2 %
GFR CALCULATED: NORMAL
GLUCOSE BLD-MCNC: 87 MG/DL
HCT VFR BLD CALC: 41.5 % (ref 36–46)
HEMOGLOBIN: 13.2 G/DL (ref 12–16)
LYMPHOCYTES ABSOLUTE: 2 /ΜL
LYMPHOCYTES RELATIVE PERCENT: 20.5 %
MCH RBC QN AUTO: 30.8 PG
MCHC RBC AUTO-ENTMCNC: 318 G/DL
MCV RBC AUTO: 96.9 FL
MONOCYTES ABSOLUTE: 0.9 /ΜL
MONOCYTES RELATIVE PERCENT: 9.3 %
NEUTROPHILS ABSOLUTE: 6.4 /ΜL
NEUTROPHILS RELATIVE PERCENT: 67.2 %
PLATELET # BLD: 247 K/ΜL
PMV BLD AUTO: 9.5 FL
POTASSIUM SERPL-SCNC: 3.5 MMOL/L
RBC # BLD: 4.28 10^6/ΜL
SODIUM BLD-SCNC: 144 MMOL/L
WBC # BLD: 9.6 10^3/ML

## 2022-01-15 NOTE — PROGRESS NOTES
2022 [facility]  [address], [address] [facility]      Assisted living apartment    [address] (During PBBRK-39 public health emergency)    HPI:    Reina Malik (:  1926) has requested an audio/video evaluation for the following concern(s):      visit for CC of psot COVID-19, recovered and returned to apartment. Pt/Nurse noted  they had c/o  Weakness & anorexia. Is worsened by recent covid event. Recovering from [address] better, should be moved from unit soon to return to room but is NOT eating and is weak per nurse report   Had anemic episode in covid unit after being placed on lovenox per treatment guidelines, was discontinued and H & H recovered    Review of Systems  ROS: Nurse/pt reports   Had   decrease in appetite, no low grade fever, now weaker. Has no runny nose, sore throat, and  no cough. No c/o loss of taste or smell,  Respiratory: Nurse listened to lungs, no c/o SOB, dyspnea, dyspnea on exertion, no   hypoxia  Cardiovascular: Nurse listened to heart sounds, no observed PND, orthopnea  GI: No V/D/C. : no reports of dysuria  Extremities: No  reports of pain issues, no edema     Prior to Visit Medications    Medication Sig Taking?  Authorizing Provider   ascorbic acid (VITAMIN C) 500 MG tablet Take 500 mg by mouth 2 times daily Indications: Coronavirus  Historical Provider, MD   alendronate (FOSAMAX) 70 MG tablet Take 70 mg by mouth every 7 days Indications: Osteoporosis  Historical Provider, MD Seema Watkins, OINT Place 1 each into the right eye nightly \" strip  Historical Provider, MD   calcium carbonate 600 MG TABS tablet Take 2 tablets by mouth daily Indications: Nutritional Support  Historical Provider, MD   furosemide (LASIX) 20 MG tablet Take 20 mg by mouth 2 times daily Indications: High Blood Pressure Disorder  Historical Provider, MD   ferrous sulfate (IRON 325) 325 (65 Fe) MG tablet Take 325 mg by mouth 2 times daily Indications: Anemia  Historical Provider, MD   melatonin 5 MG TABS tablet Take 5 mg by mouth 2 times daily Indications: Coronavirus  Historical Provider, MD   PSYLLIUM PO Take 15 g by mouth daily as needed  Historical Provider, MD   sodium chloride (CHLEO 128) 5 % ophthalmic solution 1 drop every 4 hours  Historical Provider, MD   sodium chloride 5 % ophthalmic ointment Place 1 drop into the right eye daily as needed Indications: Dry Eyes Lower eye lid  Historical Provider, MD   nystatin (MYCOSTATIN) 589767 UNIT/GM powder Apply topically daily as needed Indications: Fungal Dermatitis Apply to right Axilla  Historical Provider, MD   famotidine (PEPCID) 20 MG tablet Take 20 mg by mouth every morning Indications: Coronavirus  Historical Provider, MD   polyethyl glycol-propyl glycol 0.4-0.3 % (SYSTANE) 0.4-0.3 % ophthalmic solution Place 1 drop into both eyes 2 times daily Indications: Dry Eyes  Historical Provider, MD   zinc 50 MG TABS tablet Take 50 mg by mouth daily Indications: Coronavirus  Historical Provider, MD   traMADol (ULTRAM) 50 MG tablet Take 1 tablet by mouth every 12 hours as needed for Pain for up to 30 days.   Tony Mathew, APRN - CNP   QUEtiapine (SEROQUEL) 25 MG tablet Take 12.5 mg by mouth 2 times daily Indications: Behavioral Disorders associated with Dementia 0.5 tab=12.5mg  Historical Provider, MD   guaiFENesin (ROBITUSSIN) 100 MG/5ML syrup Take 200 mg by mouth 4 times daily as needed for Cough  Historical Provider, MD   aspirin 325 MG tablet Take 325 mg by mouth daily Indications: Preventative Treatment   Historical Provider, MD   propranolol (INDERAL) 20 MG tablet Take 20 mg by mouth nightly Indications: High Blood Pressure Disorder, Involuntary Quivering   Historical Provider, MD   diclofenac sodium (VOLTAREN) 1 % GEL Apply 2 g topically 4 times daily To affected shoulders  Patient taking differently: Apply 2 g topically daily Indications: Pain associated with Arthritis To affected shoulders  Moy Mathew, APRN - CNP   acetaminophen (TYLENOL) 500 MG tablet Take 500 mg by mouth 2 times daily Indications: Pain   Historical Provider, MD   diltiazem (DILACOR XR) 240 MG extended release capsule Take 240 mg by mouth daily Indications: High Blood Pressure Disorder   Historical Provider, MD   docusate sodium (COLACE) 100 MG capsule Take 100 mg by mouth 2 times daily Indications: Constipation   Historical Provider, MD   hydrochlorothiazide (HYDRODIURIL) 12.5 MG tablet Take 25 mg by mouth daily Indications: Congestive Heart Failure   Historical Provider, MD   meclizine (ANTIVERT) 25 MG tablet Take 12.5 mg by mouth three times daily 0.5 tabs  Historical Provider, MD   potassium chloride (KLOR-CON M) 10 MEQ extended release tablet Take 10 mEq by mouth daily Indications: Nutritional Support   Historical Provider, MD   Cholecalciferol (VITAMIN D) 2000 units CAPS capsule Take 2,000 Units by mouth 2 times daily Indications: Coronavirus, Nutritional Support   Historical Provider, MD       Social History     Tobacco Use    Smoking status: Never Smoker    Smokeless tobacco: Never Used   Substance Use Topics    Alcohol use: Not on file    Drug use: Not on file        Allergies   Allergen Reactions    Pcn [Penicillins]     Vesicare [Solifenacin]    ,   Past Medical History:   Diagnosis Date    Benign essential HTN 11/17/2019    Constipation 7/26/2016    Detrusor instability 11/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     PHYSICAL EXAMINATION:  [ INSTRUCTIONS: \"[x]\" Indicates a positive item  \"[]\" Indicates a negative item  -- DELETE ALL ITEMS NOT EXAMINED]  Vital Signs:   T  97.1  P  62  R  20  BP  119/71    Constitutional:  [x] No apparent distress      [x]  FRAIL , pale , and tired   Mental status  [x]   Awake but not alert  [x] Oriented to person [x]Able to follow commands     CONFUSED AT BASELINE  Speech is clear, and appropriate    But sparse,     Eyes:  EOM    []  Normal  [] Abnormal-  Sclera  [x]  Normal  [] Abnormal -         Discharge [x]  None visible  [] Abnormal -    HENT:   [x] Normocephalic, atraumatic. [] Abnormal   [x] Mouth/Throat: Mucous membranes are dry. External Ears [x] Normal  [] Abnormal-     Neck: [x] No visualized mass     Pulmonary/Chest: nurse reports : Heart sounds are  Reg rate  Lungs  sounds are diminished   [x] Respiratory effort normal.  [x] No visualized signs of difficulty breathing or respiratory distress        [] Abnormal-      Musculoskeletal:   [x]  gait not assessed         [x] Normal range of motion of neck        [x] Abnormal-   LIMITED ROM AT BASELINE     Neurological:        [x]   Facial Asymmetry at baseline (limited exam to video visit)          [x] No gaze palsy        [] Abnormal-         Skin:        [x] No significant exanthematous lesions or discoloration noted on facial skin         [] Abnormal-            Psychiatric:        [x] No Hallucinations        [x] Abnormal- FLAT        Other pertinent observable physical exam findings-     ASSESSMENT/PLAN:  1. COVID  Post covid but is not eating or drinking, may be a ling hauler, too soon to tell, will encourage po intake, notify family of poor po, they can bring in favorite food & fluids  Nursing will continue to feed  Stop diuretics, she is not eating or drinking     2. Anorexia  See #1   Monitor bmp as needed    3. Weak  Consider out pt PT OT if able    4.  Hypokalemia  Replace and re evaluate bmp      Stop COVID meds and labs  Needs weekly CBC  BMP  Wean off dexamethasone IF pt still taking    Medications: can stop now   Vit D3 1000 units po bid  VIt C 500mg po bid  Zinc 50mg po daily  Pepcid 20mg po daily IF NOT on PPI    Continue rest of med,   CHANGE Diltiazem to QOD due to low BP. Return if symptoms worsen or fail to improve. Pt/POA agrees to [address] Mary Malik is a 80 y.o. female being evaluated by a Virtual Visit (video visit) encounter to address concerns as mentioned above. A caregiver was present when appropriate. Due to this being a TeleHealth encounter (During PKYFC-53 public health emergency), evaluation of the following organ systems was limited: Vitals/Constitutional/EENT/Resp/CV/GI//MS/Neuro/Skin/Heme-Lymph-Imm. Pursuant to the emergency declaration under the [address] [facility], [address] [place] [address] authority and the [employer] Act, this Virtual Visit was conducted with patient's (and/or legal guardian's) consent, to reduce the patient's risk of exposure to COVID-19 and provide necessary medical care. The patient (and/or legal guardian) has also been advised to contact this office for worsening conditions or problems, and seek emergency medical treatment and/or call 911 if deemed necessary. Patient identification was verified at the start of the visit: Yes    Total time spent on this encounter: Not billed by time    Services were provided through a video synchronous discussion virtually to substitute for in-person clinic visit, and they agree to continue with assistance of NF staff and via VIRTUAL platform Patient and provider were located at their individual homes. --Coralyn Shone, APRN - CNP on 1/15/2022 at 1:47 PM    An electronic signature was used to authenticate this note. Please note this report is partially produced by using speech recognition hardware.   It may contain errors related to the system, including grammar, punctuation and spelling as well as words and phrases that may seem inaccurate.   For any questions or concerns feel free to contact me for clarification

## 2022-01-17 NOTE — PROGRESS NOTES
Name: Melvin Youngeritis   Facility: [address] [place] [address]  Rich Tran  Date: 2022     Chief Complaint   Patient presents with    Covid Testing     Positive       HPI:    Melvin Malik (:  1926) has requested an evaluation for the following concern(s):    [id number], decreased appetite, and review of labs and urine culture    Pt/Nurse noted that resident tested positive for COVID-19 on 2021. Resident was transferred to [address] [place] [address] from [facility] assisted living, resident was taken to the Covid unit and remains in isolation. Resident is lying in bed, well-developed, no acute distress. She is alert awake, cooperative with care. Nurse states that she has not been eating much, not drinking much, but is much more awake now. She has a history of dementia, no behaviors observed, is oriented to person and place, hard of hearing, does answer some simple yes/no questions appropriately. When asked resident denies symptoms of COVID-19 such as cough congestion sore throat fever chills headache lightheadedness dizziness loss of sense of taste or smell. She does have a history of chronic pain, takes tramadol and applies Voltaren gel to the affected site. Resident denies pain during this video evaluation, nurse states she denies pain this morning. PT and OT consulted as needed to maintain baseline level of function, still weak in upper and lower extremities, power grade 3 out of 5 on a scale 5. Nursing staff report labs today, CBC with differential is normal, iron level is normal, on Lovenox for elevated D-dimer. Lung sounds are clear, SPO2 is 95% on room air, no O2 worn. Vital signs stable no fever. Urine culture is negative. Continue to monitor and follow current plan of care. Review of Systems  ROS: Nurse/pt reports   Constitutional: There are no reports of behavioral issues,   Had no decrease in appetite, no low grade fever, is weak. Had no runny nose, sore throat, or cough. No c/o loss of taste or smell,  Respiratory: No c/o SOB, dyspnea, has dyspnea on exertion and change in exercise capacity  Cardiovascular: denies CP, lightheadedness, palpitations, PND, orthopnea, or claudication. GI: No N/V/D. : no reports of dysuria  Extremities: No reports of pain issues, no edema     Past Medical History:   Diagnosis Date    Benign essential HTN 11/17/2019    Constipation 7/26/2016    Detrusor instability 11/17/2019    Dyslipidemia 11/17/2019    Heart valve disease 11/17/2019    Longstanding persistent atrial fibrillation ([facility] [address] 11/17/2019    Primary osteoarthritis involving multiple joints 11/17/2019       Prior to Visit Medications    Medication Sig Taking?  Authorizing Provider   ascorbic acid (VITAMIN C) 500 MG tablet Take 500 mg by mouth 2 times daily Indications: Coronavirus  Historical Provider, MD   alendronate (FOSAMAX) 70 MG tablet Take 70 mg by mouth every 7 days Indications: Osteoporosis  Historical Provider, MD Ingrid Burciaga, OINT Place 1 each into the right eye nightly 1/4\" strip  Historical Provider, MD   calcium carbonate 600 MG TABS tablet Take 2 tablets by mouth daily Indications: Nutritional Support  Historical Provider, MD   furosemide (LASIX) 20 MG tablet Take 20 mg by mouth 2 times daily Indications: High Blood Pressure Disorder  Historical Provider, MD   ferrous sulfate (IRON 325) 325 (65 Fe) MG tablet Take 325 mg by mouth 2 times daily Indications: Anemia  Historical Provider, MD   melatonin 5 MG TABS tablet Take 5 mg by mouth 2 times daily Indications: Coronavirus  Historical Provider, MD   PSYLLIUM PO Take 15 g by mouth daily as needed  Historical Provider, MD   sodium chloride (CHELO 128) 5 % ophthalmic solution 1 drop every 4 hours  Historical Provider, MD   sodium chloride 5 % ophthalmic ointment Place 1 drop into the right eye daily as needed Indications: Dry Eyes Lower eye lid  Historical Provider, MD   nystatin (MYCOSTATIN) 270192 UNIT/GM powder Apply topically daily as needed Indications: Fungal Dermatitis Apply to right Axilla  Historical Provider, MD   famotidine (PEPCID) 20 MG tablet Take 20 mg by mouth every morning Indications: Coronavirus  Historical Provider, MD   polyethyl glycol-propyl glycol 0.4-0.3 % (SYSTANE) 0.4-0.3 % ophthalmic solution Place 1 drop into both eyes 2 times daily Indications: Dry Eyes  Historical Provider, MD   zinc 50 MG TABS tablet Take 50 mg by mouth daily Indications: Coronavirus  Historical Provider, MD   traMADol (ULTRAM) 50 MG tablet Take 1 tablet by mouth every 12 hours as needed for Pain for up to 30 days.   JESSENIA Hayward CNP   QUEtiapine (SEROQUEL) 25 MG tablet Take 12.5 mg by mouth 2 times daily Indications: Behavioral Disorders associated with Dementia 0.5 tab=12.5mg  Historical Provider, MD   guaiFENesin (ROBITUSSIN) 100 MG/5ML syrup Take 200 mg by mouth 4 times daily as needed for Cough  Historical Provider, MD   aspirin 325 MG tablet Take 325 mg by mouth daily Indications: Preventative Treatment   Historical Provider, MD   propranolol (INDERAL) 20 MG tablet Take 20 mg by mouth nightly Indications: High Blood Pressure Disorder, Involuntary Quivering   Historical Provider, MD   diclofenac sodium (VOLTAREN) 1 % GEL Apply 2 g topically 4 times daily To affected shoulders  Patient taking differently: Apply 2 g topically daily Indications: Pain associated with Arthritis To affected shoulders  JESSENIA Hayward CNP   acetaminophen (TYLENOL) 500 MG tablet Take 500 mg by mouth 2 times daily Indications: Pain   Historical Provider, MD   diltiazem (DILACOR XR) 240 MG extended release capsule Take 240 mg by mouth daily Indications: High Blood Pressure Disorder   Historical Provider, MD   docusate sodium (COLACE) 100 MG capsule Take 100 mg by mouth 2 times daily Indications: Constipation   Historical Provider, MD   hydrochlorothiazide (HYDRODIURIL) 12.5 MG tablet Take 25 mg by mouth daily Indications: Congestive Heart Failure   Historical Provider, MD   meclizine (ANTIVERT) 25 MG tablet Take 12.5 mg by mouth three times daily 0.5 tabs  Historical Provider, MD   potassium chloride (KLOR-CON M) 10 MEQ extended release tablet Take 10 mEq by mouth daily Indications: Nutritional Support   Historical Provider, MD   Cholecalciferol (VITAMIN D) 2000 units CAPS capsule Take 2,000 Units by mouth 2 times daily Indications: Coronavirus, Nutritional Support   Historical Provider, MD       Social History     Tobacco Use    Smoking status: Never Smoker    Smokeless tobacco: Never Used   Substance Use Topics    Alcohol use: Not on file    Drug use: Not on file          PHYSICAL EXAMINATION:    Vital Signs blood pressure 138/81 heart rate 60 temp 97.7 respiratory rate 18 O2 sats are 95% on room air    [ INSTRUCTIONS:  \"[x]\" Indicates a positive item  \"[]\" Indicates a negative item]      Constitutional: [x] Appears well-developed and well-nourished [x] No apparent distress      [] Abnormal- THIN , FRAIL, CACHEXIA NOTED  Mental status  [x] Alert and awake  [x] Oriented to person and place[x]Able to follow commands     PLEASANTLY CONFUSED AT BASELINE     Speech is clear, and appropriate     Eyes:  EOM    [x]  Normal  [] Abnormal-  Sclera  [x]  Normal  [] Abnormal -         Discharge [x]  None visible  [] Abnormal -    HENT:   [x] Normocephalic, atraumatic.   [] Abnormal   [x] Mouth/Throat: Mucous membranes are moist.     External Ears [x] Normal  [] Abnormal-     Neck: [x] No visualized mass     Pulmonary/Chest: [x] Chest excursion symmetrical  Heart sounds    regular per nurse  Lungs  sounds   clear per nurse   [x] Respiratory effort normal.  [x] No visualized signs of difficulty breathing or respiratory distress        [] Abnormal-      Musculoskeletal:   [] Normal gait with no signs of ataxia         [x] Normal range of motion of neck                [x] Motor grossly intact in visible upper extremities          [x] Motor grossly intact in visible lower extremities        [] Abnormal-         [x] LIMITED ROM AT BASELINE     Neurological:        [x] No Facial Asymmetry (Cranial nerve 7 motor function) (limited exam to video visit)          [x] No gaze palsy        [] Abnormal-         Skin:        [x] No significant exanthematous lesions or discoloration noted on facial skin         [] Abnormal- rash visible on skin           Psychiatric:          [x] Normal Affect [x] No Hallucinations []  Normal Mood                              [] Depressed appearing  [x] Confused appearing  [] Anxious appearing   []      NORMAL THOUGHT CONTENT, GOOD JUDGEMENT  [x] Abnormal-oriented x1         Other pertinent observable physical exam findings- [x] Abdomen soft, round, non-tender     Diagnosis Orders   1. COVID-19     2. Decreased appetite     3. Late onset Alzheimer's disease with behavioral disturbance ([facility] [address]           Reviewed labs: Yes       ASSESSMENT/PLAN:      1. COVID-19  Continue Covid labs and medications. Encourage p.o. intake. PT/OT consult as needed to maintain baseline level of function  Respiratory Therapy Consult  O2 at 2 L per nasal cannula to maintain SPO2 greater than 94%  Urine culture is negative, no new orders  Iron level is normal, DC iron supplement. Standing orders for COVID -19 labs on diagnosis AND q week:  CBC  BMP  LDH  FERRITIN  PROCALCITONIN  CRP  D-DIMER  TROPONIN  CPK    medications:  Vit D3 1000 units po bid  VIt C 500mg po bid  Zinc 50mg po daily  Pepcid 20mg po daily IF NOT on PPI  Lovenox 40 mg sq q HS    Patient does not require Dexamethasone 6mg PO QD  Patient does not  require antibiotics for concomitant bacterial pneumonia. Patient does not qualify for monoclonal antibodies. 2.  Decreased appetite  Encourage p.o. intake  Dietitian consult    3. Alzheimer's disease with behaviors  No behaviors, continue Seroquel as ordered.     Continue same meds and POC      Princess Malik is a 80 y.o. female being evaluated by a Virtual Visit (video visit) encounter to address concerns as mentioned above. A caregiver was present when appropriate. Due to this being a TeleHealth encounter (During BGDRL-16 public health emergency), evaluation of the following organ systems was limited: Vitals/Constitutional/EENT/Resp/CV/GI//MS/Neuro/Skin/Heme-Lymph-Imm. Pursuant to the emergency declaration under the [address] [place] [address], [address] [place] [address] authority and the [employer] Act, this Virtual Visit was conducted with patient's (and/or legal guardian's) consent, to reduce the patient's risk of exposure to COVID-19 and provide necessary medical care. The patient (and/or legal guardian) has also been advised to contact this office for worsening conditions or problems, and seek emergency medical treatment and/or call 911 if deemed necessary. Patient identification was verified at the start of the visit: Yes    Services were provided through a video synchronous discussion virtually to substitute for in-person clinic visit, and they agree to continue with assistance of NF staff and via VIRTUAL platform Patient and provider were located at their individual homes. --JESSENIA Foley - CNP on 1/17/2022 at 5:05 PM    An electronic signature was used to authenticate this note. Please note this report is partially produced by using speech recognition hardware. It may contain errors related to the system, including grammar, punctuation and spelling as well as words and phrases that may seem inaccurate.   For any questions or concerns feel free to contact me for clarification

## 2022-01-20 LAB
ALBUMIN SERPL-MCNC: 3.3 G/DL
ALP BLD-CCNC: 46 U/L
ALT SERPL-CCNC: 10 U/L
ANION GAP SERPL CALCULATED.3IONS-SCNC: NORMAL MMOL/L
AST SERPL-CCNC: 13 U/L
BASOPHILS ABSOLUTE: 0.1 /ΜL
BASOPHILS RELATIVE PERCENT: 1 %
BILIRUB SERPL-MCNC: 0.5 MG/DL (ref 0.1–1.4)
BUN BLDV-MCNC: 29 MG/DL
CALCIUM SERPL-MCNC: 9.2 MG/DL
CHLORIDE BLD-SCNC: 105 MMOL/L
CO2: 31 MMOL/L
CREAT SERPL-MCNC: 0.8 MG/DL
EOSINOPHILS ABSOLUTE: 0.2 /ΜL
EOSINOPHILS RELATIVE PERCENT: 2.5 %
GFR CALCULATED: NORMAL
GLUCOSE BLD-MCNC: 77 MG/DL
HCT VFR BLD CALC: 37.4 % (ref 36–46)
HEMOGLOBIN: 12.2 G/DL (ref 12–16)
LYMPHOCYTES ABSOLUTE: 2.2 /ΜL
LYMPHOCYTES RELATIVE PERCENT: 29.2 %
MCH RBC QN AUTO: 31.9 PG
MCHC RBC AUTO-ENTMCNC: 32.5 G/DL
MCV RBC AUTO: 98 FL
MONOCYTES ABSOLUTE: 0.7 /ΜL
MONOCYTES RELATIVE PERCENT: 9.1 %
NEUTROPHILS ABSOLUTE: 4.4 /ΜL
NEUTROPHILS RELATIVE PERCENT: 58.2 %
PLATELET # BLD: 231 K/ΜL
PMV BLD AUTO: 9.9 FL
POTASSIUM SERPL-SCNC: 3.8 MMOL/L
RBC # BLD: 3.82 10^6/ΜL
SODIUM BLD-SCNC: 145 MMOL/L
TOTAL PROTEIN: 5.7
WBC # BLD: 7.8 10^3/ML

## 2022-01-27 DIAGNOSIS — Z51.5 PALLIATIVE CARE ENCOUNTER: ICD-10-CM

## 2022-01-27 DIAGNOSIS — M19.90 OSTEOARTHRITIS, UNSPECIFIED OSTEOARTHRITIS TYPE, UNSPECIFIED SITE: ICD-10-CM

## 2022-01-27 DIAGNOSIS — G89.29 CHRONIC PAIN OF BOTH SHOULDERS: ICD-10-CM

## 2022-01-27 DIAGNOSIS — M25.512 CHRONIC PAIN OF BOTH SHOULDERS: ICD-10-CM

## 2022-01-27 DIAGNOSIS — M25.511 CHRONIC PAIN OF BOTH SHOULDERS: ICD-10-CM

## 2022-01-27 LAB
BASOPHILS ABSOLUTE: ABNORMAL
BASOPHILS RELATIVE PERCENT: 0.6 %
BUN BLDV-MCNC: 19 MG/DL
CALCIUM SERPL-MCNC: 9 MG/DL
CHLORIDE BLD-SCNC: 106 MMOL/L
CO2: 28 MMOL/L
CREAT SERPL-MCNC: 0.7 MG/DL
EOSINOPHILS ABSOLUTE: 0.2 /ΜL
EOSINOPHILS RELATIVE PERCENT: 2.6 %
GFR CALCULATED: 78
GLUCOSE BLD-MCNC: 90 MG/DL
HCT VFR BLD CALC: 36.2 % (ref 36–46)
HEMOGLOBIN: 11.6 G/DL (ref 12–16)
LYMPHOCYTES ABSOLUTE: 1.9 /ΜL
LYMPHOCYTES RELATIVE PERCENT: 27.8 %
MCH RBC QN AUTO: 31.2 PG
MCHC RBC AUTO-ENTMCNC: 32 G/DL
MCV RBC AUTO: 97.5 FL
MONOCYTES ABSOLUTE: 0.6 /ΜL
MONOCYTES RELATIVE PERCENT: 8.1 %
NEUTROPHILS ABSOLUTE: 4.1 /ΜL
NEUTROPHILS RELATIVE PERCENT: 60.9 %
PLATELET # BLD: 183 K/ΜL
PMV BLD AUTO: 9 FL
POTASSIUM SERPL-SCNC: 4.3 MMOL/L
RBC # BLD: 3.72 10^6/ΜL
SODIUM BLD-SCNC: 144 MMOL/L
WBC # BLD: 6.8 10^3/ML

## 2022-01-27 NOTE — TELEPHONE ENCOUNTER
Rx requested:  Requested Prescriptions     Pending Prescriptions Disp Refills    traMADol (ULTRAM) 50 MG tablet 60 tablet 0     Sig: Take 1 tablet by mouth every 12 hours as needed for Pain for up to 30 days.        Last Office Visit:   11/12/2021      Last filled:  11/12/2021    Next Visit Date:  Future Appointments   Date Time Provider Evon Sherman   2/1/2022  2:00 PM JESSENIA Garcia CNP [facility] EM[facility] MEDICAL [place] AT [place]

## 2022-01-28 RX ORDER — TRAMADOL HYDROCHLORIDE 50 MG/1
50 TABLET ORAL EVERY 12 HOURS PRN
Qty: 60 TABLET | Refills: 0 | Status: SHIPPED | OUTPATIENT
Start: 2022-01-28 | End: 2022-02-27

## 2022-02-03 NOTE — PROGRESS NOTES
Subjective:     CC: COVID-19    HPI:  Ramsey Rollins is a 80 y.o. female was seen today for COVID 19 evaluation. Patient has been COVID 19 positive since 1/2. They were seen with full PPE and observing continued droplet precautions. Condition discussed with nursing staff and treatment reviewed. Recent bloodwork, chest x-ray and vital signs reviewed. Fever curve and oxygen demands reviewed. Nutritional status and intake reviewed. Patient is demonstrating increased respiratory complaints at this time. Patient has not been febrile in the last 24 hours. Patient is able to feed themselves. Patient is demonstrating increased oxygen demand. Past Medical History:   Diagnosis Date    Benign essential HTN 11/17/2019    Constipation 7/26/2016    Detrusor instability 11/17/2019    Dyslipidemia 11/17/2019    Heart valve disease 11/17/2019    Longstanding persistent atrial fibrillation ([facility] [address] 11/17/2019    Primary osteoarthritis involving multiple joints 11/17/2019     No past surgical history on file. No family history on file. Social History     Socioeconomic History    Marital status:      Spouse name: Not on file    Number of children: Not on file    Years of education: Not on file    Highest education level: Not on file   Occupational History    Not on file   Tobacco Use    Smoking status: Never Smoker    Smokeless tobacco: Never Used   Substance and Sexual Activity    Alcohol use: Not on file    Drug use: Not on file    Sexual activity: Not on file   Other Topics Concern    Not on file   Social History Narrative    Not on file     Social Determinants of Health     Financial Resource Strain:     Difficulty of Paying Living Expenses: Not on file   Food Insecurity:     Worried About Running Out of Food in the Last Year: Not on file    Rory of Food in the Last Year: Not on file   Transportation Needs:     Lack of Transportation (Medical):  Not on file    Lack of Transportation (Non-Medical):  Not on file   Physical Activity:     Days of Exercise per Week: Not on file    Minutes of Exercise per Session: Not on file   Stress:     Feeling of Stress : Not on file   Social Connections:     Frequency of Communication with Friends and Family: Not on file    Frequency of Social Gatherings with Friends and Family: Not on file    Attends Mandaen Services: Not on file    Active Member of [address] [place] [address] or Organizations: Not on file    Attends Club or Organization Meetings: Not on file    Marital Status: Not on file   Intimate Partner Violence:     Fear of Current or Ex-Partner: Not on file    Emotionally Abused: Not on file    Physically Abused: Not on file    Sexually Abused: Not on file   Housing Stability:     Unable to Pay for Housing in the Last Year: Not on file    Number of [place] in the Last Year: Not on file    Unstable Housing in the Last Year: Not on file     Current Outpatient Medications on File Prior to Visit   Medication Sig Dispense Refill    QUEtiapine (SEROQUEL) 25 MG tablet Take 12.5 mg by mouth 2 times daily Indications: Behavioral Disorders associated with Dementia 0.5 tab=12.5mg      guaiFENesin (ROBITUSSIN) 100 MG/5ML syrup Take 200 mg by mouth 4 times daily as needed for Cough      aspirin 325 MG tablet Take 325 mg by mouth daily Indications: Preventative Treatment       propranolol (INDERAL) 20 MG tablet Take 20 mg by mouth nightly Indications: High Blood Pressure Disorder, Involuntary Quivering       diclofenac sodium (VOLTAREN) 1 % GEL Apply 2 g topically 4 times daily To affected shoulders (Patient taking differently: Apply 2 g topically daily Indications: Pain associated with Arthritis To affected shoulders) 240 g 5    acetaminophen (TYLENOL) 500 MG tablet Take 500 mg by mouth 2 times daily Indications: Pain       diltiazem (DILACOR XR) 240 MG extended release capsule Take 240 mg by mouth daily Indications: High Blood Pressure Disorder  docusate sodium (COLACE) 100 MG capsule Take 100 mg by mouth 2 times daily Indications: Constipation       hydrochlorothiazide (HYDRODIURIL) 12.5 MG tablet Take 25 mg by mouth daily Indications: Congestive Heart Failure       meclizine (ANTIVERT) 25 MG tablet Take 12.5 mg by mouth three times daily 0.5 tabs      potassium chloride (KLOR-CON M) 10 MEQ extended release tablet Take 10 mEq by mouth daily Indications: Nutritional Support       Cholecalciferol (VITAMIN D) 2000 units CAPS capsule Take 2,000 Units by mouth 2 times daily Indications: Coronavirus, Nutritional Support        No current facility-administered medications on file prior to visit. Review of Systems   Unable to perform ROS: Dementia   All other systems reviewed and are negative. Objective:     Physical Exam  Vitals and nursing note reviewed. Constitutional:       Appearance: Normal appearance. She is normal weight. HENT:      Head: Atraumatic. Cardiovascular:      Rate and Rhythm: Normal rate and regular rhythm. Pulses: Normal pulses. Pulmonary:      Effort: Pulmonary effort is normal.      Breath sounds: Rhonchi present. Abdominal:      General: Bowel sounds are normal.      Palpations: Abdomen is soft. Musculoskeletal:         General: Swelling present. Cervical back: Normal range of motion. No tenderness. Skin:     General: Skin is warm. Findings: No bruising. Neurological:      Motor: Weakness present.    Psychiatric:         Mood and Affect: Mood normal.          .[facility]  Lab Results   Component Value Date    FERRITIN 176 (H) 01/03/2022     Lab Results   Component Value Date    WBC 6.8 01/27/2022    HGB 11.6 (A) 01/27/2022    HCT 36.2 01/27/2022    MCV 97.5 01/27/2022     01/27/2022     Lab Results   Component Value Date     01/27/2022    K 4.3 01/27/2022     01/27/2022    CO2 28 01/27/2022    BUN 19 01/27/2022    CREATININE 0.7 01/27/2022    GLUCOSE 90 01/27/2022 CALCIUM 9.0 01/27/2022      Lab Results   Component Value Date    CKTOTAL 26 01/22/2013    CKMB 0.8 01/21/2013    TROPONINI 0.057 (HH) 01/02/2022     Lab Results   Component Value Date    CRP <3.0 01/11/2022      Lab Results   Component Value Date    DDIMER 0.84 ([place]) 01/11/2022      Lab Results   Component Value Date    CKTOTAL 26 01/22/2013       Please refer to on site chart for most recent chest Xray result, reviewed at this time. Assessment & Plan:     Continue with current regimen of  Vitamin C 500mg PO BID  Vitamin D 1000 IU PO QD  Zinc 50 mg PO QD  Lovenox 30mg SQ BID    Patient does require Dexamethasone 6mg PO QD  Patient does not  require antibiotics for concomitant bacterial pneumonia. Patient does not qualify for monoclonal antibodies. Continue to monitor blood work:  Weekly D-Dimer, LDH, CRP, CPK, Procalcitonin, Ferritin, Troponin, CBC, BMP. Patient's case discussed with nursing staff and Code Status reviewed. Please note orders entered on site at facility after discussion with appropriate facility nursing/therapy/ / nutritional staff. Current longstanding medical problems and acute medical issues addressed with staff. Available data and data elements in on site paper chart reviewed and analyzed. Current external consultant notes reviewed in on site chart. Ordered laboratory testing and imaging will be reviewed when available.      Philippe Chauhan MD

## 2022-02-04 ENCOUNTER — OFFICE VISIT (OUTPATIENT)
Dept: PALLATIVE CARE | Age: 87
End: 2022-02-04
Payer: MEDICARE

## 2022-02-04 DIAGNOSIS — Z51.5 PALLIATIVE CARE ENCOUNTER: Primary | ICD-10-CM

## 2022-02-04 DIAGNOSIS — M15.9 PRIMARY OSTEOARTHRITIS INVOLVING MULTIPLE JOINTS: ICD-10-CM

## 2022-02-04 DIAGNOSIS — G30.1 LATE ONSET ALZHEIMER'S DISEASE WITH BEHAVIORAL DISTURBANCE (HCC): ICD-10-CM

## 2022-02-04 DIAGNOSIS — M25.519 ARTHRALGIA OF SHOULDER, UNSPECIFIED LATERALITY: ICD-10-CM

## 2022-02-04 DIAGNOSIS — F02.818 LATE ONSET ALZHEIMER'S DISEASE WITH BEHAVIORAL DISTURBANCE (HCC): ICD-10-CM

## 2022-02-04 NOTE — PROGRESS NOTES
[employer] of [address] [facility] [facility], [address] [facility]    Subjective:      Patient Id:  Saji Patton is a 80 y.o. female who presents today with   Chief Complaint   Patient presents with    Follow-up   Azar Malik, was evaluated through a synchronous (real-time) audio-video  encounter. The patient (or guardian if applicable) is aware that this is a billable  service, which includes applicable co-pays. This Virtual Visit was conducted with  patient's (and/or legal guardian's) consent. The visit was conducted pursuant to  the emergency declaration under the [address] [place] [address] and the [place] Act. Patient identification was verified,  and a caregiver was present when appropriate. The patient was located in a  state where the provider was licensed to provide care. Azar Malik, was evaluated through a synchronous (real-time) audio-video  encounter. The patient (or guardian if applicable) is aware that this is a billable  service, which includes applicable co-pays. This Virtual Visit was conducted with  patient's (and/or legal guardian's) consent. The visit was conducted pursuant to  the emergency declaration under the [address] [place] [address] and the [place] Act. Patient identification was verified,  and a caregiver was present when appropriate. The patient was located in a  state where the provider was licensed to provide care. HPI     Seen at [facility] for symptom management follow up rt behavorial and psychological symptoms of Late onset Alzheimer's disease,OA, HTN, HLD, Vertigo, A fib, depression, and heart valve disease. She is back in her apartment after being in Covid isolation. She feels fatigued, she has not wanted to eat.  Nursing staff tell me that now that she is able to go out to dining room, her appetite has improved. They do state she lost 10 pounds over the last two months. Diuretics were stopped as her po intake was so poor. Family is not interested in feeding tube. Shoulder pain improved with tramadol. Complains of hallucinations but staff feel they are improved from last visit. Seroquel has recently been changed to 12.5 mg BID. She is able to be redirected and calmed down when disoriented. No GI or  concerns. No nik blood in stool or urine. SOB and edema at baseline. Negative excessive fatigue, fever, chills, myalgias, increased sputum production or cough, nausea, vomiting, chest pain, or orthopnea. Negative significant Sleep disturbance, depression, anxiety, or agitation episodes. Past Medical History:   Diagnosis Date    Benign essential HTN 11/17/2019    Constipation 7/26/2016    Detrusor instability 11/17/2019    Dyslipidemia 11/17/2019    Heart valve disease 11/17/2019    Longstanding persistent atrial fibrillation ([facility] [address] 11/17/2019    Primary osteoarthritis involving multiple joints 11/17/2019     No past surgical history on file. Social History     Socioeconomic History    Marital status:       Spouse name: Not on file    Number of children: Not on file    Years of education: Not on file    Highest education level: Not on file   Occupational History    Not on file   Tobacco Use    Smoking status: Never Smoker    Smokeless tobacco: Never Used   Substance and Sexual Activity    Alcohol use: Not on file    Drug use: Not on file    Sexual activity: Not on file   Other Topics Concern    Not on file   Social History Narrative    Not on file     Social Determinants of Health     Financial Resource Strain:     Difficulty of Paying Living Expenses:    Food Insecurity:     Worried About Running Out of Food in the Last Year:     [address] in the Last Year:    Transportation Needs:     Lack of Transportation (Medical):  Lack of Transportation (Non-Medical):    Physical Activity:     Days of Exercise per Week:     Minutes of Exercise per Session:    Stress:     Feeling of Stress :    Social Connections:     Frequency of Communication with Friends and Family:     Frequency of Social Gatherings with Friends and Family:     Attends Taoism Services:     Active Member of Clubs or Organizations:     Attends Club or Organization Meetings:     Marital Status:    Intimate Partner Violence:     Fear of Current or Ex-Partner:     Emotionally Abused:     Physically Abused:     Sexually Abused:      No family history on file.   Allergies   Allergen Reactions    Pcn [Penicillins]     Vesicare [Solifenacin]      Current Outpatient Medications on File Prior to Visit   Medication Sig Dispense Refill    QUEtiapine (SEROQUEL) 25 MG tablet Take 12.5 mg by mouth 2 times daily      guaiFENesin (ROBITUSSIN) 100 MG/5ML syrup Take 200 mg by mouth 4 times daily as needed for Cough      aspirin 325 MG tablet Take 325 mg by mouth daily      propranolol (INDERAL) 20 MG tablet Take 20 mg by mouth nightly      donepezil (ARICEPT) 5 MG tablet Take 5 mg by mouth nightly      diclofenac sodium (VOLTAREN) 1 % GEL Apply 2 g topically 4 times daily To affected shoulders 240 g 5    acetaminophen (TYLENOL) 500 MG tablet Take 500 mg by mouth 2 times daily      diltiazem (DILACOR XR) 240 MG extended release capsule Take 240 mg by mouth daily      docusate sodium (COLACE) 100 MG capsule Take 100 mg by mouth 2 times daily      hydrochlorothiazide (HYDRODIURIL) 12.5 MG tablet Take 12.5 mg by mouth daily      meclizine (ANTIVERT) 25 MG tablet Take 25 mg by mouth 2 times daily       potassium chloride (KLOR-CON M) 10 MEQ extended release tablet Take 10 mEq by mouth daily      Cholecalciferol (VITAMIN D) 2000 units CAPS capsule Take 2,000 Units by mouth daily       No current facility-administered medications on file prior to visit. Review of Systems   Constitutional: Positive for fatigue. Negative for activity change, appetite change, chills, diaphoresis, fever and unexpected weight change. HENT: Negative for drooling, hearing loss, mouth sores, sore throat, trouble swallowing and voice change. Eyes: Negative for discharge and visual disturbance. Respiratory: Negative for apnea, cough, choking, chest tightness, shortness of breath, wheezing and stridor. Cardiovascular: Negative for chest pain, palpitations and leg swelling. Gastrointestinal: Negative for abdominal distention, abdominal pain, anal bleeding, blood in stool, constipation, diarrhea, nausea, rectal pain and vomiting. Genitourinary: Negative for difficulty urinating, dysuria, enuresis, frequency and hematuria. Musculoskeletal: Positive for arthralgias. Negative for back pain, gait problem, joint swelling and myalgias. Skin: Negative for color change, pallor, rash and wound. Allergic/Immunologic: Negative for food allergies and immunocompromised state. Neurological: Negative for dizziness, tremors, seizures, syncope, facial asymmetry, speech difficulty, weakness, light-headedness, numbness and headaches. Hematological: Negative for adenopathy. Does not bruise/bleed easily. Psychiatric/Behavioral: Positive for hallucinations. Negative for agitation, behavioral problems, confusion, decreased concentration, dysphoric mood, self-injury, sleep disturbance and suicidal ideas. The patient is nervous/anxious. The patient is not hyperactive. Objective: There were no vitals taken for this visit. Physical Exam  Constitutional:       General: She is not in acute distress. Appearance: She is well-developed. She is not diaphoretic. HENT:      Head: Normocephalic and atraumatic.       Right Ear: External ear normal.      Left Ear: External ear normal.      Nose: Nose normal.      Mouth/Throat:      Pharynx: No oropharyngeal exudate. Eyes:      General: No scleral icterus. Right eye: No discharge. Left eye: No discharge. Conjunctiva/sclera: Conjunctivae normal.      Pupils: Pupils are equal, round, and reactive to light. Neck:      Thyroid: No thyromegaly. Vascular: No JVD. Trachea: No tracheal deviation. Cardiovascular:      Rate and Rhythm: Normal rate and regular rhythm. Heart sounds: Murmur heard. Pulmonary:      Effort: Pulmonary effort is normal. No respiratory distress. Breath sounds: Normal breath sounds. No stridor. No wheezing or rales. Chest:      Chest wall: No tenderness. Abdominal:      General: Bowel sounds are normal. There is no distension. Palpations: Abdomen is soft. There is no mass. Tenderness: There is no abdominal tenderness. There is no guarding or rebound. Musculoskeletal:         General: No tenderness or deformity. Normal range of motion. Cervical back: Normal range of motion and neck supple. Lymphadenopathy:      Cervical: No cervical adenopathy. Skin:     General: Skin is warm and dry. Capillary Refill: Capillary refill takes less than 2 seconds. Findings: No erythema or rash. Neurological:      Mental Status: She is alert. Mental status is at baseline. Psychiatric:         Attention and Perception: Attention and perception normal.         Speech: Speech normal.         Behavior: Behavior normal.         Thought Content: Thought content normal.     Due to this being a TeleHealth encounter, evaluation of the following organ systems is limited: Vitals/Constitutional/EENT/Resp/CV/GI//MS/Neuro/Skin/Heme-Lymph-Imm. Assessment:       Diagnosis Orders   1. Late onset Alzheimer's disease with behavioral disturbance ([facility] [address]     2. Behavioral and psychological symptoms of dementia ([facility] [address]     3. Primary osteoarthritis involving multiple joints     4.  Palliative care encounter            Plan:    -Dementia with behavioral disturbance  Monitor for signs and symptoms of delirium as due to advanced age, history of cognitive difficulty, acute illness, and multiple co morbidities patient is high risk. Offer continuous behavior redirection and orientation, avoid delirium inducing medication, expose to natural sunlight as much as possible, calm surroundings as much as possible. Continue Seroquel 12.5 mg BID, Aricept, and lexapro. 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 Attempted to call her guardian Arcadio Barrios twice. SHe was unable to talk      No follow-ups on file. Ehsan Sepulveda CNP                      Assessment:       Diagnosis Orders   1. Palliative care encounter     2. Arthralgia of shoulder, unspecified laterality     3. Primary osteoarthritis involving multiple joints     4. Late onset Alzheimer's disease with behavioral disturbance ([address]            Plan:      No orders of the defined types were placed in this encounter. No orders of the defined types were placed in this encounter. Return in about 4 weeks (around 3/4/2022).     JESSENIA Durán - CNP

## 2022-02-05 NOTE — PROGRESS NOTES
Subjective:     CC: COVID-19    HPI:  Kenny Leggett is a 80 y.o. female was seen today for COVID 19 evaluation. Patient has been COVID 19 positive since 1/3. They were seen with full PPE and observing continued droplet precautions. Condition discussed with nursing staff and treatment reviewed. Recent bloodwork, chest x-ray and vital signs reviewed. Fever curve and oxygen demands reviewed. Nutritional status and intake reviewed. Patient is not demonstrating increased respiratory complaints at this time. Patient has not been febrile in the last 24 hours. Patient is able to feed themselves. Patient is not demonstrating increased oxygen demand. Past Medical History:   Diagnosis Date    Benign essential HTN 11/17/2019    Constipation 7/26/2016    Detrusor instability 11/17/2019    Dyslipidemia 11/17/2019    Heart valve disease 11/17/2019    Longstanding persistent atrial fibrillation ([facility] [address] 11/17/2019    Primary osteoarthritis involving multiple joints 11/17/2019     No past surgical history on file. No family history on file. Social History     Socioeconomic History    Marital status:      Spouse name: Not on file    Number of children: Not on file    Years of education: Not on file    Highest education level: Not on file   Occupational History    Not on file   Tobacco Use    Smoking status: Never Smoker    Smokeless tobacco: Never Used   Substance and Sexual Activity    Alcohol use: Not on file    Drug use: Not on file    Sexual activity: Not on file   Other Topics Concern    Not on file   Social History Narrative    Not on file     Social Determinants of Health     Financial Resource Strain:     Difficulty of Paying Living Expenses: Not on file   Food Insecurity:     Worried About Running Out of Food in the Last Year: Not on file    Rory of Food in the Last Year: Not on file   Transportation Needs:     Lack of Transportation (Medical):  Not on file    Lack of Transportation (Non-Medical):  Not on file   Physical Activity:     Days of Exercise per Week: Not on file    Minutes of Exercise per Session: Not on file   Stress:     Feeling of Stress : Not on file   Social Connections:     Frequency of Communication with Friends and Family: Not on file    Frequency of Social Gatherings with Friends and Family: Not on file    Attends Zoroastrian Services: Not on file    Active Member of [address] [place] [address] or Organizations: Not on file    Attends Club or Organization Meetings: Not on file    Marital Status: Not on file   Intimate Partner Violence:     Fear of Current or Ex-Partner: Not on file    Emotionally Abused: Not on file    Physically Abused: Not on file    Sexually Abused: Not on file   Housing Stability:     Unable to Pay for Housing in the Last Year: Not on file    Number of [place] in the Last Year: Not on file    Unstable Housing in the Last Year: Not on file     Current Outpatient Medications on File Prior to Visit   Medication Sig Dispense Refill    QUEtiapine (SEROQUEL) 25 MG tablet Take 12.5 mg by mouth 2 times daily Indications: Behavioral Disorders associated with Dementia 0.5 tab=12.5mg      guaiFENesin (ROBITUSSIN) 100 MG/5ML syrup Take 200 mg by mouth 4 times daily as needed for Cough      aspirin 325 MG tablet Take 325 mg by mouth daily Indications: Preventative Treatment       propranolol (INDERAL) 20 MG tablet Take 20 mg by mouth nightly Indications: High Blood Pressure Disorder, Involuntary Quivering       diclofenac sodium (VOLTAREN) 1 % GEL Apply 2 g topically 4 times daily To affected shoulders (Patient taking differently: Apply 2 g topically daily Indications: Pain associated with Arthritis To affected shoulders) 240 g 5    acetaminophen (TYLENOL) 500 MG tablet Take 500 mg by mouth 2 times daily Indications: Pain       diltiazem (DILACOR XR) 240 MG extended release capsule Take 240 mg by mouth daily Indications: High Blood Pressure Disorder 01/27/2022    CO2 28 01/27/2022    BUN 19 01/27/2022    CREATININE 0.7 01/27/2022    GLUCOSE 90 01/27/2022    CALCIUM 9.0 01/27/2022      Lab Results   Component Value Date    CKTOTAL 26 01/22/2013    CKMB 0.8 01/21/2013    TROPONINI 0.057 () 01/02/2022     Lab Results   Component Value Date    CRP <3.0 01/11/2022      Lab Results   Component Value Date    DDIMER 0.84 ([facility]) 01/11/2022      Lab Results   Component Value Date    CKTOTAL 26 01/22/2013       Please refer to on site chart for most recent chest Xray result, reviewed at this time. Assessment & Plan:      Diagnosis Orders   1. COVID-19         Continue with current regimen of  Vitamin C 500mg PO BID  Vitamin D 1000 IU PO QD  Zinc 50 mg PO QD  Lovenox 30mg SQ BID    Patient does  require Dexamethasone 6mg PO QD  Patient does not  require antibiotics for concomitant bacterial pneumonia. Patient does not qualify for monoclonal antibodies. Continue to monitor blood work:  Weekly D-Dimer, LDH, CRP, CPK, Procalcitonin, Ferritin, Troponin, CBC, BMP. Patient's case discussed with nursing staff and Code Status reviewed. Please note orders entered on site at facility after discussion with appropriate facility nursing/therapy/ / nutritional staff. Current longstanding medical problems and acute medical issues addressed with staff. Available data and data elements in on site paper chart reviewed and analyzed. Current external consultant notes reviewed in on site chart. Ordered laboratory testing and imaging will be reviewed when available.      Leia Up MD

## 2022-03-01 ENCOUNTER — OFFICE VISIT (OUTPATIENT)
Dept: PALLATIVE CARE | Age: 87
End: 2022-03-01
Payer: MEDICARE

## 2022-03-01 DIAGNOSIS — M25.512 PAIN OF BOTH SHOULDER JOINTS: ICD-10-CM

## 2022-03-01 DIAGNOSIS — F02.818 LATE ONSET ALZHEIMER'S DISEASE WITH BEHAVIORAL DISTURBANCE (HCC): ICD-10-CM

## 2022-03-01 DIAGNOSIS — M25.511 PAIN OF BOTH SHOULDER JOINTS: ICD-10-CM

## 2022-03-01 DIAGNOSIS — M15.9 OSTEOARTHRITIS OF MULTIPLE JOINTS, UNSPECIFIED OSTEOARTHRITIS TYPE: ICD-10-CM

## 2022-03-01 DIAGNOSIS — Z51.5 PALLIATIVE CARE ENCOUNTER: Primary | ICD-10-CM

## 2022-03-01 DIAGNOSIS — G30.1 LATE ONSET ALZHEIMER'S DISEASE WITH BEHAVIORAL DISTURBANCE (HCC): ICD-10-CM

## 2022-03-01 NOTE — PROGRESS NOTES
Not on file   Occupational History    Not on file   Tobacco Use    Smoking status: Never Smoker    Smokeless tobacco: Never Used   Substance and Sexual Activity    Alcohol use: Not on file    Drug use: Not on file    Sexual activity: Not on file   Other Topics Concern    Not on file   Social History Narrative    Not on file     Social Determinants of Health     Financial Resource Strain:     Difficulty of Paying Living Expenses:    Food Insecurity:     Worried About Running Out of Food in the Last Year:     [address] in the Last Year:    Transportation Needs:     Lack of Transportation (Medical):  Lack of Transportation (Non-Medical):    Physical Activity:     Days of Exercise per Week:     Minutes of Exercise per Session:    Stress:     Feeling of Stress :    Social Connections:     Frequency of Communication with Friends and Family:     Frequency of Social Gatherings with Friends and Family:     Attends [employer] Services:     Active Member of Clubs or Organizations:     Attends Club or Organization Meetings:     Marital Status:    Intimate Partner Violence:     Fear of Current or Ex-Partner:     Emotionally Abused:     Physically Abused:     Sexually Abused:      No family history on file.   Allergies   Allergen Reactions    Pcn [Penicillins]     Vesicare [Solifenacin]      Current Outpatient Medications on File Prior to Visit   Medication Sig Dispense Refill    QUEtiapine (SEROQUEL) 25 MG tablet Take 12.5 mg by mouth 2 times daily      guaiFENesin (ROBITUSSIN) 100 MG/5ML syrup Take 200 mg by mouth 4 times daily as needed for Cough      aspirin 325 MG tablet Take 325 mg by mouth daily      propranolol (INDERAL) 20 MG tablet Take 20 mg by mouth nightly      donepezil (ARICEPT) 5 MG tablet Take 5 mg by mouth nightly      diclofenac sodium (VOLTAREN) 1 % GEL Apply 2 g topically 4 times daily To affected shoulders 240 g 5    acetaminophen (TYLENOL) 500 MG tablet Take 500 mg by mouth 2 times daily      diltiazem (DILACOR XR) 240 MG extended release capsule Take 240 mg by mouth daily      docusate sodium (COLACE) 100 MG capsule Take 100 mg by mouth 2 times daily      hydrochlorothiazide (HYDRODIURIL) 12.5 MG tablet Take 12.5 mg by mouth daily      meclizine (ANTIVERT) 25 MG tablet Take 25 mg by mouth 2 times daily       potassium chloride (KLOR-CON M) 10 MEQ extended release tablet Take 10 mEq by mouth daily      Cholecalciferol (VITAMIN D) 2000 units CAPS capsule Take 2,000 Units by mouth daily       No current facility-administered medications on file prior to visit. Review of Systems   Constitutional: Positive for fatigue. Negative for activity change, appetite change, chills, diaphoresis, fever and unexpected weight change. HENT: Negative for drooling, hearing loss, mouth sores, sore throat, trouble swallowing and voice change. Eyes: Negative for discharge and visual disturbance. Respiratory: Negative for apnea, cough, choking, chest tightness, shortness of breath, wheezing and stridor. Cardiovascular: Negative for chest pain, palpitations and leg swelling. Gastrointestinal: Negative for abdominal distention, abdominal pain, anal bleeding, blood in stool, constipation, diarrhea, nausea, rectal pain and vomiting. Genitourinary: Negative for difficulty urinating, dysuria, enuresis, frequency and hematuria. Musculoskeletal: Positive for arthralgias. Negative for back pain, gait problem, joint swelling and myalgias. Skin: Negative for color change, pallor, rash and wound. Allergic/Immunologic: Negative for food allergies and immunocompromised state. Neurological: Negative for dizziness, tremors, seizures, syncope, facial asymmetry, speech difficulty, weakness, light-headedness, numbness and headaches. Hematological: Negative for adenopathy. Does not bruise/bleed easily. Psychiatric/Behavioral: Positive for hallucinations.  Negative for agitation, behavioral problems, confusion, decreased concentration, dysphoric mood, self-injury, sleep disturbance and suicidal ideas. The patient is nervous/anxious. The patient is not hyperactive. Objective: There were no vitals taken for this visit. Physical Exam  Constitutional:       General: She is not in acute distress. Appearance: She is well-developed. She is not diaphoretic. HENT:      Head: Normocephalic and atraumatic. Right Ear: External ear normal.      Left Ear: External ear normal.      Nose: Nose normal.      Mouth/Throat:      Pharynx: No oropharyngeal exudate. Eyes:      General: No scleral icterus. Right eye: No discharge. Left eye: No discharge. Conjunctiva/sclera: Conjunctivae normal.      Pupils: Pupils are equal, round, and reactive to light. Neck:      Thyroid: No thyromegaly. Vascular: No JVD. Trachea: No tracheal deviation. Cardiovascular:      Rate and Rhythm: Normal rate and regular rhythm. Heart sounds: Murmur heard. Pulmonary:      Effort: Pulmonary effort is normal. No respiratory distress. Breath sounds: Normal breath sounds. No stridor. No wheezing or rales. Chest:      Chest wall: No tenderness. Abdominal:      General: Bowel sounds are normal. There is no distension. Palpations: Abdomen is soft. There is no mass. Tenderness: There is no abdominal tenderness. There is no guarding or rebound. Musculoskeletal:         General: No tenderness or deformity. Normal range of motion. Cervical back: Normal range of motion and neck supple. Lymphadenopathy:      Cervical: No cervical adenopathy. Skin:     General: Skin is warm and dry. Capillary Refill: Capillary refill takes less than 2 seconds. Findings: No erythema or rash. Neurological:      Mental Status: She is alert. Mental status is at baseline.    Psychiatric:         Attention and Perception: Attention and perception normal. Speech: Speech normal.         Behavior: Behavior normal.         Thought Content: Thought content normal.         Assessment:       Diagnosis Orders   1. Late onset Alzheimer's disease with behavioral disturbance ([facility] [address]     2. Behavioral and psychological symptoms of dementia ([facility] [address]     3. Primary osteoarthritis involving multiple joints     4. Palliative care encounter            Plan:    -Dementia with behavioral disturbance  Monitor for signs and symptoms of delirium as due to advanced age, history of cognitive difficulty, acute illness, and multiple co morbidities patient is high risk. Offer continuous behavior redirection and orientation, avoid delirium inducing medication, expose to natural sunlight as much as possible, calm surroundings as much as possible. Continue Seroquel 12.5 mg BID, Aricept, and lexapro. Multi-joint pain related to immobility and OA  - Continue Tramadol 50 mg po every 12 hours prn moderate to severe pain  - Continue Acetaminophen and diclofenac gel  - Heat or ice to painful areas, whichever is preferred  - Gentle ROM exercises    Palliative Care  Estimated Fast 6E  PPS 40%  Post Covid Syndrome        No follow-ups on file. Rajiv Ramirez CNP                      Assessment:       Diagnosis Orders   1. Palliative care encounter     2. Pain of both shoulder joints     3. Osteoarthritis of multiple joints, unspecified osteoarthritis type     4. Late onset Alzheimer's disease with behavioral disturbance ([facility] [address]            Plan:      No orders of the defined types were placed in this encounter. No orders of the defined types were placed in this encounter. No follow-ups on file.     JESSENIA Bautista - YANET

## 2022-03-02 VITALS
SYSTOLIC BLOOD PRESSURE: 120 MMHG | OXYGEN SATURATION: 98 % | DIASTOLIC BLOOD PRESSURE: 78 MMHG | HEART RATE: 65 BPM | RESPIRATION RATE: 18 BRPM

## 2022-04-05 ENCOUNTER — OFFICE VISIT (OUTPATIENT)
Dept: PALLATIVE CARE | Age: 87
End: 2022-04-05
Payer: MEDICARE

## 2022-04-05 VITALS
TEMPERATURE: 96.5 F | DIASTOLIC BLOOD PRESSURE: 74 MMHG | BODY MASS INDEX: 28.8 KG/M2 | SYSTOLIC BLOOD PRESSURE: 125 MMHG | RESPIRATION RATE: 18 BRPM | WEIGHT: 152.4 LBS | OXYGEN SATURATION: 95 % | HEART RATE: 97 BPM

## 2022-04-05 DIAGNOSIS — G30.1 LATE ONSET ALZHEIMER'S DISEASE WITH BEHAVIORAL DISTURBANCE (HCC): ICD-10-CM

## 2022-04-05 DIAGNOSIS — M15.9 PRIMARY OSTEOARTHRITIS INVOLVING MULTIPLE JOINTS: ICD-10-CM

## 2022-04-05 DIAGNOSIS — M19.90 OSTEOARTHRITIS, UNSPECIFIED OSTEOARTHRITIS TYPE, UNSPECIFIED SITE: ICD-10-CM

## 2022-04-05 DIAGNOSIS — F03.918 BEHAVIORAL AND PSYCHOLOGICAL SYMPTOMS OF DEMENTIA: Primary | ICD-10-CM

## 2022-04-05 DIAGNOSIS — Z51.5 PALLIATIVE CARE ENCOUNTER: ICD-10-CM

## 2022-04-05 DIAGNOSIS — M25.50 ARTHRALGIA, UNSPECIFIED JOINT: ICD-10-CM

## 2022-04-05 DIAGNOSIS — F02.818 LATE ONSET ALZHEIMER'S DISEASE WITH BEHAVIORAL DISTURBANCE (HCC): ICD-10-CM

## 2022-04-05 DIAGNOSIS — Z74.09 IMMOBILITY: ICD-10-CM

## 2022-04-05 NOTE — PROGRESS NOTES
[employer] of [address] [facility], [address] [facility]    Subjective:      Patient Id:  Lana Natarajan is a 80 y.o. female who presents today with     Chief Complaint   Patient presents with    Follow-up       HPI     Seen at hospitals DR. BECCA SANTIAGO for symptom management follow up rt behavorial and psychological symptoms of Late onset Alzheimer's disease,OA, HTN, HLD, Vertigo, A fib, depression, and heart valve disease. In wheelchair in room after lunch, I assisted her to a regular chair. She transfers herself with me acting as a guide. Appetite improved, feeds herself. Weight increasing. She is alert, answering questions appropriately, she can make her needs known,    Continues Tylenol 500 mg po Tid,Voltaren gel qid, Tramadol 50 mg po every 12 hours prn moderate to severe pain. able to move both arms today without difficulty, though she cannot raise them above shoulder level without discomfort. She tells me her shoulder is very uncomfortable today. Has not used tramadol in a week. I remind staff she can have tramadol for moderate to sever pain such as today. Complains of hallucinations but staff feel they are improved with Seroquel increased to 12.5 mg BID. She is able to be redirected and calmed down when disoriented. No GI or  concerns. No nik blood in stool or urine. SOB and edema at baseline. Negative excessive fatigue, fever, chills, myalgias, increased sputum production or cough, nausea, vomiting, chest pain, or orthopnea. Negative significant Sleep disturbance, depression, anxiety, or agitation episodes.         Past Medical History:   Diagnosis Date    Benign essential HTN 11/17/2019    Constipation 7/26/2016    Detrusor instability 11/17/2019    Dyslipidemia 11/17/2019    Heart valve disease 11/17/2019    Longstanding persistent atrial fibrillation ([facility] [address] 11/17/2019    Primary osteoarthritis involving multiple joints 11/17/2019     No past surgical history on file.  Social History     Socioeconomic History    Marital status:      Spouse name: Not on file    Number of children: Not on file    Years of education: Not on file    Highest education level: Not on file   Occupational History    Not on file   Tobacco Use    Smoking status: Never Smoker    Smokeless tobacco: Never Used   Substance and Sexual Activity    Alcohol use: Not on file    Drug use: Not on file    Sexual activity: Not on file   Other Topics Concern    Not on file   Social History Narrative    Not on file     Social Determinants of Health     Financial Resource Strain:     Difficulty of Paying Living Expenses:    Food Insecurity:     Worried About Running Out of Food in the Last Year:     [address] in the Last Year:    Transportation Needs:     Lack of Transportation (Medical):  Lack of Transportation (Non-Medical):    Physical Activity:     Days of Exercise per Week:     Minutes of Exercise per Session:    Stress:     Feeling of Stress :    Social Connections:     Frequency of Communication with Friends and Family:     Frequency of Social Gatherings with Friends and Family:     Attends [employer] Services:     Active Member of Clubs or Organizations:     Attends Club or Organization Meetings:     Marital Status:    Intimate Partner Violence:     Fear of Current or Ex-Partner:     Emotionally Abused:     Physically Abused:     Sexually Abused:      No family history on file.   Allergies   Allergen Reactions    PCN [Penicillins]     Vesicare [Solifenacin]      Current Outpatient Medications on File Prior to Visit   Medication Sig Dispense Refill    QUEtiapine (SEROQUEL) 25 MG tablet Take 12.5 mg by mouth 2 times daily      guaiFENesin (ROBITUSSIN) 100 MG/5ML syrup Take 200 mg by mouth 4 times daily as needed for Cough      aspirin 325 MG tablet Take 325 mg by mouth daily      propranolol (INDERAL) 20 MG tablet Take 20 mg by mouth nightly      donepezil (ARICEPT) 5 MG tablet Take 5 mg by mouth nightly      diclofenac sodium (VOLTAREN) 1 % GEL Apply 2 g topically 4 times daily To affected shoulders 240 g 5    acetaminophen (TYLENOL) 500 MG tablet Take 500 mg by mouth 2 times daily      diltiazem (DILACOR XR) 240 MG extended release capsule Take 240 mg by mouth daily      docusate sodium (COLACE) 100 MG capsule Take 100 mg by mouth 2 times daily      hydrochlorothiazide (HYDRODIURIL) 12.5 MG tablet Take 12.5 mg by mouth daily      meclizine (ANTIVERT) 25 MG tablet Take 25 mg by mouth 2 times daily       potassium chloride (KLOR-CON M) 10 MEQ extended release tablet Take 10 mEq by mouth daily      Cholecalciferol (VITAMIN D) 2000 units CAPS capsule Take 2,000 Units by mouth daily       No current facility-administered medications on file prior to visit. Review of Systems   Constitutional: Positive for fatigue. Negative for activity change, appetite change, chills, diaphoresis, fever and unexpected weight change. HENT: Negative for drooling, hearing loss, mouth sores, sore throat, trouble swallowing and voice change. Eyes: Negative for discharge and visual disturbance. Respiratory: Negative for apnea, cough, choking, chest tightness, shortness of breath, wheezing and stridor. Cardiovascular: Negative for chest pain, palpitations and leg swelling. Gastrointestinal: Negative for abdominal distention, abdominal pain, anal bleeding, blood in stool, constipation, diarrhea, nausea, rectal pain and vomiting. Genitourinary: Negative for difficulty urinating, dysuria, enuresis, frequency and hematuria. Musculoskeletal: Positive for arthralgias. Negative for back pain, gait problem, joint swelling and myalgias. Skin: Negative for color change, pallor, rash and wound. Allergic/Immunologic: Negative for food allergies and immunocompromised state.    Neurological: Negative for dizziness, tremors, seizures, syncope, facial asymmetry, speech difficulty, weakness, light-headedness, numbness and headaches. Hematological: Negative for adenopathy. Does not bruise/bleed easily. Psychiatric/Behavioral: Positive for hallucinations. Negative for agitation, behavioral problems, confusion, decreased concentration, dysphoric mood, self-injury, sleep disturbance and suicidal ideas. The patient is nervous/anxious. The patient is not hyperactive. Objective: There were no vitals taken for this visit. Physical Exam  Constitutional:       General: She is not in acute distress. Appearance: She is well-developed. She is not diaphoretic. HENT:      Head: Normocephalic and atraumatic. Right Ear: External ear normal.      Left Ear: External ear normal.      Nose: Nose normal.      Mouth/Throat:      Pharynx: No oropharyngeal exudate. Eyes:      General: No scleral icterus. Right eye: No discharge. Left eye: No discharge. Conjunctiva/sclera: Conjunctivae normal.      Pupils: Pupils are equal, round, and reactive to light. Neck:      Thyroid: No thyromegaly. Vascular: No JVD. Trachea: No tracheal deviation. Cardiovascular:      Rate and Rhythm: Normal rate and regular rhythm. Heart sounds: Murmur heard. Pulmonary:      Effort: Pulmonary effort is normal. No respiratory distress. Breath sounds: Normal breath sounds. No stridor. No wheezing or rales. Chest:      Chest wall: No tenderness. Abdominal:      General: Bowel sounds are normal. There is no distension. Palpations: Abdomen is soft. There is no mass. Tenderness: There is no abdominal tenderness. There is no guarding or rebound. Musculoskeletal:         General: No tenderness or deformity. Normal range of motion. Cervical back: Normal range of motion and neck supple. Lymphadenopathy:      Cervical: No cervical adenopathy. Skin:     General: Skin is warm and dry.       Capillary Refill: Capillary refill takes less than 2 seconds. Findings: No erythema or rash. Neurological:      Mental Status: She is alert. Mental status is at baseline. Psychiatric:         Attention and Perception: Attention and perception normal.         Speech: Speech normal.         Behavior: Behavior normal.         Thought Content: Thought content normal.         Assessment:       Diagnosis Orders   1. Late onset Alzheimer's disease with behavioral disturbance ([facility] [address]     2. Behavioral and psychological symptoms of dementia ([facility] [address]     3. Primary osteoarthritis involving multiple joints     4. Palliative care encounter            Plan:    -Dementia with behavioral disturbance  Monitor for signs and symptoms of delirium as due to advanced age, history of cognitive difficulty, acute illness, and multiple co morbidities patient is high risk. Offer continuous behavior redirection and orientation, avoid delirium inducing medication, expose to natural sunlight as much as possible, calm surroundings as much as possible. Continue Seroquel 12.5 mg BID, Aricept, and lexapro. Multi-joint pain related to immobility and OA  - Continue Tramadol 50 mg po every 12 hours prn moderate to severe pain  - Continue Acetaminophen and diclofenac gel  - Heat or ice to painful areas, whichever is preferred  - Gentle ROM exercises    Palliative Care  Estimated Fast 6E  PPS 50%        No follow-ups on file. Moy Yao [facility] of [address], [address] [address]    Subjective:      Patient Id:  Sherren Portal is a 80 y.o. female who presents today with   Chief Complaint   Patient presents with    Follow-up       HPI     Seen at HOSP DR. BECCA SANTIAGO for symptom management follow up rt behavorial and psychological symptoms of Late onset Alzheimer's disease,OA, HTN, HLD, Vertigo, A fib, depression, and heart valve disease. Much improved since my last visit, up in chair in dining room.  Appetite is much improved, feeds herself. Weight increasing. Continues Tylenol 500 mg po bid, one tramadol at night, and Voltaren gel, able to move both arms today without difficulty, though she cannot raise them above shoulder level without discomfort. I remind staff she can have a tramadol in am if pain is severe during the day. Complains of hallucinations but staff feel they are improved with Seroquel increased to 12.5 mg BID. She is able to be redirected and calmed down when disoriented. No GI or  concerns. No nik blood in stool or urine. SOB and edema at baseline. Negative excessive fatigue, fever, chills, myalgias, increased sputum production or cough, nausea, vomiting, chest pain, or orthopnea. Negative significant Sleep disturbance, depression, anxiety, or agitation episodes. She is looking forward to getting her nails done today. Past Medical History:   Diagnosis Date    Benign essential HTN 11/17/2019    Constipation 7/26/2016    Detrusor instability 11/17/2019    Dyslipidemia 11/17/2019    Heart valve disease 11/17/2019    Longstanding persistent atrial fibrillation ([facility] [address] 11/17/2019    Primary osteoarthritis involving multiple joints 11/17/2019     No past surgical history on file. Social History     Socioeconomic History    Marital status:       Spouse name: Not on file    Number of children: Not on file    Years of education: Not on file    Highest education level: Not on file   Occupational History    Not on file   Tobacco Use    Smoking status: Never Smoker    Smokeless tobacco: Never Used   Substance and Sexual Activity    Alcohol use: Not on file    Drug use: Not on file    Sexual activity: Not on file   Other Topics Concern    Not on file   Social History Narrative    Not on file     Social Determinants of Health     Financial Resource Strain:     Difficulty of Paying Living Expenses:    Food Insecurity:     Worried About Running Out of Food in the Last Year:  Ran Out of Food in the Last Year:    Transportation Needs:     Lack of Transportation (Medical):  Lack of Transportation (Non-Medical):    Physical Activity:     Days of Exercise per Week:     Minutes of Exercise per Session:    Stress:     Feeling of Stress :    Social Connections:     Frequency of Communication with Friends and Family:     Frequency of Social Gatherings with Friends and Family:     Attends Scientologist Services:     Active Member of Clubs or Organizations:     Attends Club or Organization Meetings:     Marital Status:    Intimate Partner Violence:     Fear of Current or Ex-Partner:     Emotionally Abused:     Physically Abused:     Sexually Abused:      No family history on file.   Allergies   Allergen Reactions    Pcn [Penicillins]     Vesicare [Solifenacin]      Current Outpatient Medications on File Prior to Visit   Medication Sig Dispense Refill    QUEtiapine (SEROQUEL) 25 MG tablet Take 12.5 mg by mouth 2 times daily      guaiFENesin (ROBITUSSIN) 100 MG/5ML syrup Take 200 mg by mouth 4 times daily as needed for Cough      aspirin 325 MG tablet Take 325 mg by mouth daily      propranolol (INDERAL) 20 MG tablet Take 20 mg by mouth nightly      donepezil (ARICEPT) 5 MG tablet Take 5 mg by mouth nightly      diclofenac sodium (VOLTAREN) 1 % GEL Apply 2 g topically 4 times daily To affected shoulders 240 g 5    acetaminophen (TYLENOL) 500 MG tablet Take 500 mg by mouth 2 times daily      diltiazem (DILACOR XR) 240 MG extended release capsule Take 240 mg by mouth daily      docusate sodium (COLACE) 100 MG capsule Take 100 mg by mouth 2 times daily      hydrochlorothiazide (HYDRODIURIL) 12.5 MG tablet Take 12.5 mg by mouth daily      meclizine (ANTIVERT) 25 MG tablet Take 25 mg by mouth 2 times daily       potassium chloride (KLOR-CON M) 10 MEQ extended release tablet Take 10 mEq by mouth daily      Cholecalciferol (VITAMIN D) 2000 units CAPS capsule Take 2,000 Units by mouth daily       No current facility-administered medications on file prior to visit. Review of Systems   Constitutional: Positive for fatigue. Negative for activity change, appetite change, chills, diaphoresis, fever and unexpected weight change. HENT: Negative for drooling, hearing loss, mouth sores, sore throat, trouble swallowing and voice change. Eyes: Negative for discharge and visual disturbance. Respiratory: Negative for apnea, cough, choking, chest tightness, shortness of breath, wheezing and stridor. Cardiovascular: Negative for chest pain, palpitations and leg swelling. Gastrointestinal: Negative for abdominal distention, abdominal pain, anal bleeding, blood in stool, constipation, diarrhea, nausea, rectal pain and vomiting. Genitourinary: Negative for difficulty urinating, dysuria, enuresis, frequency and hematuria. Musculoskeletal: Positive for arthralgias. Negative for back pain, gait problem, joint swelling and myalgias. Skin: Negative for color change, pallor, rash and wound. Allergic/Immunologic: Negative for food allergies and immunocompromised state. Neurological: Negative for dizziness, tremors, seizures, syncope, facial asymmetry, speech difficulty, weakness, light-headedness, numbness and headaches. Hematological: Negative for adenopathy. Does not bruise/bleed easily. Psychiatric/Behavioral: Positive for hallucinations. Negative for agitation, behavioral problems, confusion, decreased concentration, dysphoric mood, self-injury, sleep disturbance and suicidal ideas. The patient is nervous/anxious. The patient is not hyperactive. Objective: There were no vitals taken for this visit. Physical Exam  Constitutional:       General: She is not in acute distress. Appearance: She is well-developed. She is not diaphoretic. HENT:      Head: Normocephalic and atraumatic.       Right Ear: External ear normal.      Left Ear: External ear normal. Nose: Nose normal.      Mouth/Throat:      Pharynx: No oropharyngeal exudate. Eyes:      General: No scleral icterus. Right eye: No discharge. Left eye: No discharge. Conjunctiva/sclera: Conjunctivae normal.      Pupils: Pupils are equal, round, and reactive to light. Neck:      Thyroid: No thyromegaly. Vascular: No JVD. Trachea: No tracheal deviation. Cardiovascular:      Rate and Rhythm: Normal rate and regular rhythm. Heart sounds: Murmur heard. Pulmonary:      Effort: Pulmonary effort is normal. No respiratory distress. Breath sounds: Normal breath sounds. No stridor. No wheezing or rales. Chest:      Chest wall: No tenderness. Abdominal:      General: Bowel sounds are normal. There is no distension. Palpations: Abdomen is soft. There is no mass. Tenderness: There is no abdominal tenderness. There is no guarding or rebound. Musculoskeletal:         General: No tenderness or deformity. Normal range of motion. Cervical back: Normal range of motion and neck supple. Lymphadenopathy:      Cervical: No cervical adenopathy. Skin:     General: Skin is warm and dry. Capillary Refill: Capillary refill takes less than 2 seconds. Findings: No erythema or rash. Neurological:      Mental Status: She is alert. Mental status is at baseline. Psychiatric:         Attention and Perception: Attention and perception normal.         Speech: Speech normal.         Behavior: Behavior normal.         Thought Content: Thought content normal.         Assessment:       Diagnosis Orders   1. Late onset Alzheimer's disease with behavioral disturbance ([facility] [address]     2. Behavioral and psychological symptoms of dementia ([facility] [address]     3. Primary osteoarthritis involving multiple joints     4.  Palliative care encounter            Plan:    -Dementia with behavioral disturbance  Monitor for signs and symptoms of delirium as due to advanced age, history of cognitive difficulty, acute illness, and multiple co morbidities patient is high risk. Offer continuous behavior redirection and orientation, avoid delirium inducing medication, expose to natural sunlight as much as possible, calm surroundings as much as possible. Continue Seroquel 12.5 mg BID, Aricept, and lexapro. Multi-joint pain related to immobility and OA  - Continue Tramadol 50 mg po every 12 hours prn moderate to severe pain  - Continue Acetaminophen and diclofenac gel  - Heat or ice to painful areas, whichever is preferred  - Gentle ROM exercises    Palliative Care  Estimated Fast 6E  PPS 40%  Post Covid Syndrome        No follow-ups on file. Kasi Piña CNP                      Assessment:       Diagnosis Orders   1. Behavioral and psychological symptoms of dementia ([facility] [address]     2. Late onset Alzheimer's disease with behavioral disturbance ([facility] [address]     3. Primary osteoarthritis involving multiple joints     4. Arthralgia, unspecified joint     5. Palliative care encounter     6. Immobility     7. Osteoarthritis, unspecified osteoarthritis type, unspecified site            Plan:      No orders of the defined types were placed in this encounter. No orders of the defined types were placed in this encounter. No follow-ups on file. Kasi Piña, APRN - CNP                [place] [address] [address], [address] [address]    Subjective:      Patient Id:  Med Russo is a 80 y.o. female who presents today with   Chief Complaint   Patient presents with    Follow-up       HPI     Seen at HOSP DR. BECCA SANTIAGO for symptom management follow up rt behavorial and psychological symptoms of Late onset Alzheimer's disease,OA, HTN, HLD, Vertigo, A fib, depression, and heart valve disease. Much improved since my last visit, up in chair in dining room. Appetite is much improved, feeds herself. Weight increasing.     Continues Tylenol 500 mg po bid, one tramadol at night, and Voltaren gel, able to move both arms today without difficulty, though she cannot raise them above shoulder level without discomfort. I remind staff she can have a tramadol in am if pain is severe during the day. Complains of hallucinations but staff feel they are improved with Seroquel increased to 12.5 mg BID. She is able to be redirected and calmed down when disoriented. No GI or  concerns. No nik blood in stool or urine. SOB and edema at baseline. Negative excessive fatigue, fever, chills, myalgias, increased sputum production or cough, nausea, vomiting, chest pain, or orthopnea. Negative significant Sleep disturbance, depression, anxiety, or agitation episodes. She is looking forward to getting her nails done today. Past Medical History:   Diagnosis Date    Benign essential HTN 11/17/2019    Constipation 7/26/2016    Detrusor instability 11/17/2019    Dyslipidemia 11/17/2019    Heart valve disease 11/17/2019    Longstanding persistent atrial fibrillation ([facility] [address] 11/17/2019    Primary osteoarthritis involving multiple joints 11/17/2019     No past surgical history on file. Social History     Socioeconomic History    Marital status:       Spouse name: Not on file    Number of children: Not on file    Years of education: Not on file    Highest education level: Not on file   Occupational History    Not on file   Tobacco Use    Smoking status: Never Smoker    Smokeless tobacco: Never Used   Substance and Sexual Activity    Alcohol use: Not on file    Drug use: Not on file    Sexual activity: Not on file   Other Topics Concern    Not on file   Social History Narrative    Not on file     Social Determinants of Health     Financial Resource Strain:     Difficulty of Paying Living Expenses:    Food Insecurity:     Worried About Running Out of Food in the Last Year:     [address] in the Last Year:    Transportation Needs:     Lack of Transportation (Medical):  Lack of Transportation (Non-Medical):    Physical Activity:     Days of Exercise per Week:     Minutes of Exercise per Session:    Stress:     Feeling of Stress :    Social Connections:     Frequency of Communication with Friends and Family:     Frequency of Social Gatherings with Friends and Family:     Attends Congregational Services:     Active Member of Clubs or Organizations:     Attends Club or Organization Meetings:     Marital Status:    Intimate Partner Violence:     Fear of Current or Ex-Partner:     Emotionally Abused:     Physically Abused:     Sexually Abused:      No family history on file. Allergies   Allergen Reactions    Pcn [Penicillins]     Vesicare [Solifenacin]      Current Outpatient Medications on File Prior to Visit   Medication Sig Dispense Refill    QUEtiapine (SEROQUEL) 25 MG tablet Take 12.5 mg by mouth 2 times daily      guaiFENesin (ROBITUSSIN) 100 MG/5ML syrup Take 200 mg by mouth 4 times daily as needed for Cough      aspirin 325 MG tablet Take 325 mg by mouth daily      propranolol (INDERAL) 20 MG tablet Take 20 mg by mouth nightly      donepezil (ARICEPT) 5 MG tablet Take 5 mg by mouth nightly      diclofenac sodium (VOLTAREN) 1 % GEL Apply 2 g topically 4 times daily To affected shoulders 240 g 5    acetaminophen (TYLENOL) 500 MG tablet Take 500 mg by mouth 2 times daily      diltiazem (DILACOR XR) 240 MG extended release capsule Take 240 mg by mouth daily      docusate sodium (COLACE) 100 MG capsule Take 100 mg by mouth 2 times daily      hydrochlorothiazide (HYDRODIURIL) 12.5 MG tablet Take 12.5 mg by mouth daily      meclizine (ANTIVERT) 25 MG tablet Take 25 mg by mouth 2 times daily       potassium chloride (KLOR-CON M) 10 MEQ extended release tablet Take 10 mEq by mouth daily      Cholecalciferol (VITAMIN D) 2000 units CAPS capsule Take 2,000 Units by mouth daily       No current facility-administered medications on file prior to visit. Review of Systems   Constitutional: Positive for fatigue. Negative for activity change, appetite change, chills, diaphoresis, fever and unexpected weight change. HENT: Negative for drooling, hearing loss, mouth sores, sore throat, trouble swallowing and voice change. Eyes: Negative for discharge and visual disturbance. Respiratory: Negative for apnea, cough, choking, chest tightness, shortness of breath, wheezing and stridor. Cardiovascular: Negative for chest pain, palpitations and leg swelling. Gastrointestinal: Negative for abdominal distention, abdominal pain, anal bleeding, blood in stool, constipation, diarrhea, nausea, rectal pain and vomiting. Genitourinary: Negative for difficulty urinating, dysuria, enuresis, frequency and hematuria. Musculoskeletal: Positive for arthralgias. Negative for back pain, gait problem, joint swelling and myalgias. Skin: Negative for color change, pallor, rash and wound. Allergic/Immunologic: Negative for food allergies and immunocompromised state. Neurological: Negative for dizziness, tremors, seizures, syncope, facial asymmetry, speech difficulty, weakness, light-headedness, numbness and headaches. Hematological: Negative for adenopathy. Does not bruise/bleed easily. Psychiatric/Behavioral: Positive for hallucinations. Negative for agitation, behavioral problems, confusion, decreased concentration, dysphoric mood, self-injury, sleep disturbance and suicidal ideas. The patient is nervous/anxious. The patient is not hyperactive. Objective: There were no vitals taken for this visit. Physical Exam  Constitutional:       General: She is not in acute distress. Appearance: She is well-developed. She is not diaphoretic. HENT:      Head: Normocephalic and atraumatic. Right Ear: External ear normal.      Left Ear: External ear normal.      Nose: Nose normal.      Mouth/Throat:      Pharynx: No oropharyngeal exudate.    Eyes: General: No scleral icterus. Right eye: No discharge. Left eye: No discharge. Conjunctiva/sclera: Conjunctivae normal.      Pupils: Pupils are equal, round, and reactive to light. Neck:      Thyroid: No thyromegaly. Vascular: No JVD. Trachea: No tracheal deviation. Cardiovascular:      Rate and Rhythm: Normal rate and regular rhythm. Heart sounds: Murmur heard. Pulmonary:      Effort: Pulmonary effort is normal. No respiratory distress. Breath sounds: Normal breath sounds. No stridor. No wheezing or rales. Chest:      Chest wall: No tenderness. Abdominal:      General: Bowel sounds are normal. There is no distension. Palpations: Abdomen is soft. There is no mass. Tenderness: There is no abdominal tenderness. There is no guarding or rebound. Musculoskeletal:         General: No tenderness or deformity. Normal range of motion. Cervical back: Normal range of motion and neck supple. Lymphadenopathy:      Cervical: No cervical adenopathy. Skin:     General: Skin is warm and dry. Capillary Refill: Capillary refill takes less than 2 seconds. Findings: No erythema or rash. Neurological:      Mental Status: She is alert. Mental status is at baseline. Psychiatric:         Attention and Perception: Attention and perception normal.         Speech: Speech normal.         Behavior: Behavior normal.         Thought Content: Thought content normal.         Assessment:       Diagnosis Orders   1. Late onset Alzheimer's disease with behavioral disturbance ([address]     2. Behavioral and psychological symptoms of dementia ([address]     3. Primary osteoarthritis involving multiple joints     4. Palliative care encounter            Plan:    -Dementia with behavioral disturbance  Monitor for signs and symptoms of delirium as due to advanced age, history of cognitive difficulty, acute illness, and multiple co morbidities patient is high risk.  Offer continuous

## 2022-05-11 ENCOUNTER — OFFICE VISIT (OUTPATIENT)
Dept: GERIATRIC MEDICINE | Age: 87
End: 2022-05-11
Payer: MEDICARE

## 2022-05-11 DIAGNOSIS — G30.1 LATE ONSET ALZHEIMER'S DISEASE WITH BEHAVIORAL DISTURBANCE (HCC): ICD-10-CM

## 2022-05-11 DIAGNOSIS — F02.818 LATE ONSET ALZHEIMER'S DISEASE WITH BEHAVIORAL DISTURBANCE (HCC): ICD-10-CM

## 2022-05-11 DIAGNOSIS — I48.20 CHRONIC ATRIAL FIBRILLATION (HCC): ICD-10-CM

## 2022-05-11 DIAGNOSIS — F32.0 CURRENT MILD EPISODE OF MAJOR DEPRESSIVE DISORDER WITHOUT PRIOR EPISODE (HCC): ICD-10-CM

## 2022-05-11 DIAGNOSIS — G89.29 CHRONIC RIGHT SHOULDER PAIN: Primary | ICD-10-CM

## 2022-05-11 DIAGNOSIS — M25.511 CHRONIC RIGHT SHOULDER PAIN: Primary | ICD-10-CM

## 2022-05-11 PROCEDURE — 1123F ACP DISCUSS/DSCN MKR DOCD: CPT | Performed by: NURSE PRACTITIONER

## 2022-05-25 ENCOUNTER — OFFICE VISIT (OUTPATIENT)
Dept: PALLATIVE CARE | Age: 87
End: 2022-05-25
Payer: MEDICARE

## 2022-05-25 DIAGNOSIS — G30.1 LATE ONSET ALZHEIMER'S DISEASE WITH BEHAVIORAL DISTURBANCE (HCC): Primary | ICD-10-CM

## 2022-05-25 DIAGNOSIS — M25.50 ARTHRALGIA, UNSPECIFIED JOINT: ICD-10-CM

## 2022-05-25 DIAGNOSIS — F02.818 LATE ONSET ALZHEIMER'S DISEASE WITH BEHAVIORAL DISTURBANCE (HCC): Primary | ICD-10-CM

## 2022-05-25 DIAGNOSIS — F03.918 BEHAVIORAL AND PSYCHOLOGICAL SYMPTOMS OF DEMENTIA: ICD-10-CM

## 2022-05-25 DIAGNOSIS — M15.9 PRIMARY OSTEOARTHRITIS INVOLVING MULTIPLE JOINTS: ICD-10-CM

## 2022-05-25 DIAGNOSIS — Z51.5 PALLIATIVE CARE ENCOUNTER: ICD-10-CM

## 2022-05-25 PROCEDURE — 1123F ACP DISCUSS/DSCN MKR DOCD: CPT | Performed by: NURSE PRACTITIONER

## 2022-05-25 NOTE — PROGRESS NOTES
[employer] of [address] [facility], [address] [facility]    Subjective:      Patient Id:  River Alonso is a 80 y.o. female who presents today with     Chief Complaint   Patient presents with    Follow-up       HPI     Seen at [facility] DR. BECCA SANTIAGO for symptom management follow up rt behavorial and psychological symptoms of Late onset Alzheimer's disease,OA, HTN, HLD, Vertigo, A fib, depression, and heart valve disease. In wheelchair in room after lunch, I assisted her to a regular chair. She transfers herself with me acting as a guide. Appetite improved, feeds herself. Weight increasing. She is alert, answering questions appropriately, she can make her needs known,    Continues Tylenol 500 mg po Tid,Voltaren gel qid, Tramadol 50 mg po every 12 hours prn moderate to severe pain. able to move both arms today without difficulty, though she cannot raise them above shoulder level without discomfort. She tells me her shoulder is very uncomfortable today. Has not used tramadol in a week. I remind staff she can have tramadol for moderate to sever pain such as today. She is wheelchair bound, 1 person assist with transfers. Complains of hallucinations but staff feel they are improved with Seroquel increased to 12.5 mg BID. She is able to be redirected and calmed down when disoriented. No GI or  concerns. No nik blood in stool or urine. SOB and edema at baseline. Negative excessive fatigue, fever, chills, myalgias, increased sputum production or cough, nausea, vomiting, chest pain, or orthopnea. Negative significant Sleep disturbance, depression, anxiety, or agitation episodes.         Past Medical History:   Diagnosis Date    Benign essential HTN 11/17/2019    Constipation 7/26/2016    Detrusor instability 11/17/2019    Dyslipidemia 11/17/2019    Heart valve disease 11/17/2019    Longstanding persistent atrial fibrillation ([address] 11/17/2019    Primary osteoarthritis involving multiple joints 11/17/2019     No past surgical history on file. Social History     Socioeconomic History    Marital status:      Spouse name: Not on file    Number of children: Not on file    Years of education: Not on file    Highest education level: Not on file   Occupational History    Not on file   Tobacco Use    Smoking status: Never Smoker    Smokeless tobacco: Never Used   Substance and Sexual Activity    Alcohol use: Not on file    Drug use: Not on file    Sexual activity: Not on file   Other Topics Concern    Not on file   Social History Narrative    Not on file     Social Determinants of Health     Financial Resource Strain:     Difficulty of Paying Living Expenses:    Food Insecurity:     Worried About Running Out of Food in the Last Year:     [address] in the Last Year:    Transportation Needs:     Lack of Transportation (Medical):  Lack of Transportation (Non-Medical):    Physical Activity:     Days of Exercise per Week:     Minutes of Exercise per Session:    Stress:     Feeling of Stress :    Social Connections:     Frequency of Communication with Friends and Family:     Frequency of Social Gatherings with Friends and Family:     Attends Christian Services:     Active Member of Clubs or Organizations:     Attends Club or Organization Meetings:     Marital Status:    Intimate Partner Violence:     Fear of Current or Ex-Partner:     Emotionally Abused:     Physically Abused:     Sexually Abused:      No family history on file.   Allergies   Allergen Reactions    PCN [Penicillins]     Vesicare [Solifenacin]      Current Outpatient Medications on File Prior to Visit   Medication Sig Dispense Refill    QUEtiapine (SEROQUEL) 25 MG tablet Take 12.5 mg by mouth 2 times daily      guaiFENesin (ROBITUSSIN) 100 MG/5ML syrup Take 200 mg by mouth 4 times daily as needed for Cough      aspirin 325 MG tablet Take 325 mg by mouth daily      propranolol (INDERAL) 20 MG tablet Take 20 mg by mouth nightly      donepezil (ARICEPT) 5 MG tablet Take 5 mg by mouth nightly      diclofenac sodium (VOLTAREN) 1 % GEL Apply 2 g topically 4 times daily To affected shoulders 240 g 5    acetaminophen (TYLENOL) 500 MG tablet Take 500 mg by mouth 2 times daily      diltiazem (DILACOR XR) 240 MG extended release capsule Take 240 mg by mouth daily      docusate sodium (COLACE) 100 MG capsule Take 100 mg by mouth 2 times daily      hydrochlorothiazide (HYDRODIURIL) 12.5 MG tablet Take 12.5 mg by mouth daily      meclizine (ANTIVERT) 25 MG tablet Take 25 mg by mouth 2 times daily       potassium chloride (KLOR-CON M) 10 MEQ extended release tablet Take 10 mEq by mouth daily      Cholecalciferol (VITAMIN D) 2000 units CAPS capsule Take 2,000 Units by mouth daily       No current facility-administered medications on file prior to visit. Review of Systems   Constitutional: Positive for fatigue. Negative for activity change, appetite change, chills, diaphoresis, fever and unexpected weight change. HENT: Negative for drooling, hearing loss, mouth sores, sore throat, trouble swallowing and voice change. Eyes: Negative for discharge and visual disturbance. Respiratory: Negative for apnea, cough, choking, chest tightness, shortness of breath, wheezing and stridor. Cardiovascular: Negative for chest pain, palpitations and leg swelling. Gastrointestinal: Negative for abdominal distention, abdominal pain, anal bleeding, blood in stool, constipation, diarrhea, nausea, rectal pain and vomiting. Genitourinary: Negative for difficulty urinating, dysuria, enuresis, frequency and hematuria. Musculoskeletal: Positive for arthralgias. Negative for back pain, gait problem, joint swelling and myalgias. Skin: Negative for color change, pallor, rash and wound. Allergic/Immunologic: Negative for food allergies and immunocompromised state.    Neurological: Negative for dizziness, tremors, seizures, syncope, facial asymmetry, speech difficulty, weakness, light-headedness, numbness and headaches. Hematological: Negative for adenopathy. Does not bruise/bleed easily. Psychiatric/Behavioral: Positive for hallucinations. Negative for agitation, behavioral problems, confusion, decreased concentration, dysphoric mood, self-injury, sleep disturbance and suicidal ideas. The patient is nervous/anxious. The patient is not hyperactive. Objective: There were no vitals taken for this visit. Physical Exam  Constitutional:       General: She is not in acute distress. Appearance: She is well-developed. She is not diaphoretic. HENT:      Head: Normocephalic and atraumatic. Right Ear: External ear normal.      Left Ear: External ear normal.      Nose: Nose normal.      Mouth/Throat:      Pharynx: No oropharyngeal exudate. Eyes:      General: No scleral icterus. Right eye: No discharge. Left eye: No discharge. Conjunctiva/sclera: Conjunctivae normal.      Pupils: Pupils are equal, round, and reactive to light. Neck:      Thyroid: No thyromegaly. Vascular: No JVD. Trachea: No tracheal deviation. Cardiovascular:      Rate and Rhythm: Normal rate and regular rhythm. Heart sounds: Murmur heard. Pulmonary:      Effort: Pulmonary effort is normal. No respiratory distress. Breath sounds: Normal breath sounds. No stridor. No wheezing or rales. Chest:      Chest wall: No tenderness. Abdominal:      General: Bowel sounds are normal. There is no distension. Palpations: Abdomen is soft. There is no mass. Tenderness: There is no abdominal tenderness. There is no guarding or rebound. Musculoskeletal:         General: No tenderness or deformity. Normal range of motion. Cervical back: Normal range of motion and neck supple. Lymphadenopathy:      Cervical: No cervical adenopathy. Skin:     General: Skin is warm and dry. Capillary Refill: Capillary refill takes less than 2 seconds. Findings: No erythema or rash. Neurological:      Mental Status: She is alert. Mental status is at baseline. Psychiatric:         Attention and Perception: Attention and perception normal.         Speech: Speech normal.         Behavior: Behavior normal.         Thought Content: Thought content normal.         Assessment:       Diagnosis Orders   1. Late onset Alzheimer's disease with behavioral disturbance ([facility]     2. Behavioral and psychological symptoms of dementia ([facility] [address]     3. Primary osteoarthritis involving multiple joints     4. Palliative care encounter            Plan:    -Dementia with behavioral disturbance  Monitor for signs and symptoms of delirium as due to advanced age, history of cognitive difficulty, acute illness, and multiple co morbidities patient is high risk. Offer continuous behavior redirection and orientation, avoid delirium inducing medication, expose to natural sunlight as much as possible, calm surroundings as much as possible. Continue Seroquel 12.5 mg BID, Aricept, and lexapro. Multi-joint pain related to immobility and OA  - Continue Tramadol 50 mg po every 12 hours prn moderate to severe pain  - Continue Acetaminophen and diclofenac gel  - Heat or ice to painful areas, whichever is preferred  - Gentle ROM exercises    Palliative Care  Estimated Fast 6E  PPS 50%        No follow-ups on file. Vicenta Alvarado [facility] of [address], [address] [address]    Subjective:      Patient Id:  River Alonso is a 80 y.o. female who presents today with   Chief Complaint   Patient presents with    Follow-up       HPI     Seen at [facility] DR. BECCA SANTIAGO for symptom management follow up rt behavorial and psychological symptoms of Late onset Alzheimer's disease,OA, HTN, HLD, Vertigo, A fib, depression, and heart valve disease.       Much improved since my last visit, up in chair in dining room. Appetite is much improved, feeds herself. Weight increasing. Continues Tylenol 500 mg po bid, one tramadol at night, and Voltaren gel, able to move both arms today without difficulty, though she cannot raise them above shoulder level without discomfort. I remind staff she can have a tramadol in am if pain is severe during the day. Complains of hallucinations but staff feel they are improved with Seroquel increased to 12.5 mg BID. She is able to be redirected and calmed down when disoriented. No GI or  concerns. No nik blood in stool or urine. SOB and edema at baseline. Negative excessive fatigue, fever, chills, myalgias, increased sputum production or cough, nausea, vomiting, chest pain, or orthopnea. Negative significant Sleep disturbance, depression, anxiety, or agitation episodes. She is looking forward to getting her nails done today. Past Medical History:   Diagnosis Date    Benign essential HTN 11/17/2019    Constipation 7/26/2016    Detrusor instability 11/17/2019    Dyslipidemia 11/17/2019    Heart valve disease 11/17/2019    Longstanding persistent atrial fibrillation ([facility] [address] 11/17/2019    Primary osteoarthritis involving multiple joints 11/17/2019     No past surgical history on file. Social History     Socioeconomic History    Marital status:       Spouse name: Not on file    Number of children: Not on file    Years of education: Not on file    Highest education level: Not on file   Occupational History    Not on file   Tobacco Use    Smoking status: Never Smoker    Smokeless tobacco: Never Used   Substance and Sexual Activity    Alcohol use: Not on file    Drug use: Not on file    Sexual activity: Not on file   Other Topics Concern    Not on file   Social History Narrative    Not on file     Social Determinants of Health     Financial Resource Strain:     Difficulty of Paying Living Expenses:    Food Insecurity:     Worried About [facility] in the Last Year:    [address] in the Last Year:    Transportation Needs:     Lack of Transportation (Medical):  Lack of Transportation (Non-Medical):    Physical Activity:     Days of Exercise per Week:     Minutes of Exercise per Session:    Stress:     Feeling of Stress :    Social Connections:     Frequency of Communication with Friends and Family:     Frequency of Social Gatherings with Friends and Family:     Attends Yaz[employer] Services:     Active Member of Clubs or Organizations:     Attends Club or Organization Meetings:     Marital Status:    Intimate Partner Violence:     Fear of Current or Ex-Partner:     Emotionally Abused:     Physically Abused:     Sexually Abused:      No family history on file.   Allergies   Allergen Reactions    Pcn [Penicillins]     Vesicare [Solifenacin]      Current Outpatient Medications on File Prior to Visit   Medication Sig Dispense Refill    QUEtiapine (SEROQUEL) 25 MG tablet Take 12.5 mg by mouth 2 times daily      guaiFENesin (ROBITUSSIN) 100 MG/5ML syrup Take 200 mg by mouth 4 times daily as needed for Cough      aspirin 325 MG tablet Take 325 mg by mouth daily      propranolol (INDERAL) 20 MG tablet Take 20 mg by mouth nightly      donepezil (ARICEPT) 5 MG tablet Take 5 mg by mouth nightly      diclofenac sodium (VOLTAREN) 1 % GEL Apply 2 g topically 4 times daily To affected shoulders 240 g 5    acetaminophen (TYLENOL) 500 MG tablet Take 500 mg by mouth 2 times daily      diltiazem (DILACOR XR) 240 MG extended release capsule Take 240 mg by mouth daily      docusate sodium (COLACE) 100 MG capsule Take 100 mg by mouth 2 times daily      hydrochlorothiazide (HYDRODIURIL) 12.5 MG tablet Take 12.5 mg by mouth daily      meclizine (ANTIVERT) 25 MG tablet Take 25 mg by mouth 2 times daily       potassium chloride (KLOR-CON M) 10 MEQ extended release tablet Take 10 mEq by mouth daily      Cholecalciferol (VITAMIN D) 2000 units CAPS capsule Take 2,000 Units by mouth daily       No current facility-administered medications on file prior to visit. Review of Systems   Constitutional: Positive for fatigue. Negative for activity change, appetite change, chills, diaphoresis, fever and unexpected weight change. HENT: Negative for drooling, hearing loss, mouth sores, sore throat, trouble swallowing and voice change. Eyes: Negative for discharge and visual disturbance. Respiratory: Negative for apnea, cough, choking, chest tightness, shortness of breath, wheezing and stridor. Cardiovascular: Negative for chest pain, palpitations and leg swelling. Gastrointestinal: Negative for abdominal distention, abdominal pain, anal bleeding, blood in stool, constipation, diarrhea, nausea, rectal pain and vomiting. Genitourinary: Negative for difficulty urinating, dysuria, enuresis, frequency and hematuria. Musculoskeletal: Positive for arthralgias. Negative for back pain, gait problem, joint swelling and myalgias. Skin: Negative for color change, pallor, rash and wound. Allergic/Immunologic: Negative for food allergies and immunocompromised state. Neurological: Negative for dizziness, tremors, seizures, syncope, facial asymmetry, speech difficulty, weakness, light-headedness, numbness and headaches. Hematological: Negative for adenopathy. Does not bruise/bleed easily. Psychiatric/Behavioral: Positive for hallucinations. Negative for agitation, behavioral problems, confusion, decreased concentration, dysphoric mood, self-injury, sleep disturbance and suicidal ideas. The patient is nervous/anxious. The patient is not hyperactive. Objective: There were no vitals taken for this visit. Physical Exam  Constitutional:       General: She is not in acute distress. Appearance: She is well-developed. She is not diaphoretic. HENT:      Head: Normocephalic and atraumatic.       Right Ear: External ear normal.      Left Ear: External ear normal.      Nose: Nose normal.      Mouth/Throat:      Pharynx: No oropharyngeal exudate. Eyes:      General: No scleral icterus. Right eye: No discharge. Left eye: No discharge. Conjunctiva/sclera: Conjunctivae normal.      Pupils: Pupils are equal, round, and reactive to light. Neck:      Thyroid: No thyromegaly. Vascular: No JVD. Trachea: No tracheal deviation. Cardiovascular:      Rate and Rhythm: Normal rate and regular rhythm. Heart sounds: Murmur heard. Pulmonary:      Effort: Pulmonary effort is normal. No respiratory distress. Breath sounds: Normal breath sounds. No stridor. No wheezing or rales. Chest:      Chest wall: No tenderness. Abdominal:      General: Bowel sounds are normal. There is no distension. Palpations: Abdomen is soft. There is no mass. Tenderness: There is no abdominal tenderness. There is no guarding or rebound. Musculoskeletal:         General: No tenderness or deformity. Normal range of motion. Cervical back: Normal range of motion and neck supple. Lymphadenopathy:      Cervical: No cervical adenopathy. Skin:     General: Skin is warm and dry. Capillary Refill: Capillary refill takes less than 2 seconds. Findings: No erythema or rash. Neurological:      Mental Status: She is alert. Mental status is at baseline. Psychiatric:         Attention and Perception: Attention and perception normal.         Speech: Speech normal.         Behavior: Behavior normal.         Thought Content: Thought content normal.         Assessment:       Diagnosis Orders   1. Late onset Alzheimer's disease with behavioral disturbance ([address]     2. Behavioral and psychological symptoms of dementia ([facility] [address]     3. Primary osteoarthritis involving multiple joints     4.  Palliative care encounter            Plan:    -Dementia with behavioral disturbance  Monitor for signs and symptoms of delirium as due to advanced age, history of cognitive difficulty, acute illness, and multiple co morbidities patient is high risk. Offer continuous behavior redirection and orientation, avoid delirium inducing medication, expose to natural sunlight as much as possible, calm surroundings as much as possible. Continue Seroquel 12.5 mg BID, Aricept, and lexapro. Multi-joint pain related to immobility and OA  - Continue Tramadol 50 mg po every 12 hours prn moderate to severe pain  - Continue Acetaminophen and diclofenac gel  - Heat or ice to painful areas, whichever is preferred  - Gentle ROM exercises    Palliative Care  Estimated Fast 6E  PPS 40%  Post Covid Syndrome        No follow-ups on file. Tylor Lemons CNP                      Assessment:       Diagnosis Orders   1. Late onset Alzheimer's disease with behavioral disturbance ([facility] [address]     2. Behavioral and psychological symptoms of dementia ([facility] [address]     3. Primary osteoarthritis involving multiple joints     4. Palliative care encounter     5. Arthralgia, unspecified joint            Plan:      No orders of the defined types were placed in this encounter. No orders of the defined types were placed in this encounter. No follow-ups on file. Tylor Lemons, JESSENIA - YANET                [facility] [address], [address] [facility]    Subjective:      Patient Id:  Shari Carnes is a 80 y.o. female who presents today with   Chief Complaint   Patient presents with    Follow-up       HPI     Seen at [facility]OS[facility] DR. BECCA SANTIAGO for symptom management follow up rt behavorial and psychological symptoms of Late onset Alzheimer's disease,OA, HTN, HLD, Vertigo, A fib, depression, and heart valve disease. Much improved since my last visit, up in chair in dining room. Appetite is much improved, feeds herself. Weight increasing.     Continues Tylenol 500 mg po bid, one tramadol at night, and Voltaren gel, able to move both arms today without difficulty, though she cannot raise them above shoulder level without discomfort. I remind staff she can have a tramadol in am if pain is severe during the day. Complains of hallucinations but staff feel they are improved with Seroquel increased to 12.5 mg BID. She is able to be redirected and calmed down when disoriented. No GI or  concerns. No nik blood in stool or urine. SOB and edema at baseline. Negative excessive fatigue, fever, chills, myalgias, increased sputum production or cough, nausea, vomiting, chest pain, or orthopnea. Negative significant Sleep disturbance, depression, anxiety, or agitation episodes. She is looking forward to getting her nails done today. Past Medical History:   Diagnosis Date    Benign essential HTN 11/17/2019    Constipation 7/26/2016    Detrusor instability 11/17/2019    Dyslipidemia 11/17/2019    Heart valve disease 11/17/2019    Longstanding persistent atrial fibrillation ([facility] [address] 11/17/2019    Primary osteoarthritis involving multiple joints 11/17/2019     No past surgical history on file. Social History     Socioeconomic History    Marital status:      Spouse name: Not on file    Number of children: Not on file    Years of education: Not on file    Highest education level: Not on file   Occupational History    Not on file   Tobacco Use    Smoking status: Never Smoker    Smokeless tobacco: Never Used   Substance and Sexual Activity    Alcohol use: Not on file    Drug use: Not on file    Sexual activity: Not on file   Other Topics Concern    Not on file   Social History Narrative    Not on file     Social Determinants of Health     Financial Resource Strain:     Difficulty of Paying Living Expenses:    Food Insecurity:     Worried About Running Out of Food in the Last Year:     [address] in the Last Year:    Transportation Needs:     Lack of Transportation (Medical):      Lack of Transportation (Non-Medical):    Physical Activity:     Days of Exercise per Week:     Minutes of Exercise per Session:    Stress:     Feeling of Stress :    Social Connections:     Frequency of Communication with Friends and Family:     Frequency of Social Gatherings with Friends and Family:     Attends [employer] Services:     Active Member of Clubs or Organizations:     Attends Club or Organization Meetings:     Marital Status:    Intimate Partner Violence:     Fear of Current or Ex-Partner:     Emotionally Abused:     Physically Abused:     Sexually Abused:      No family history on file. Allergies   Allergen Reactions    Pcn [Penicillins]     Vesicare [Solifenacin]      Current Outpatient Medications on File Prior to Visit   Medication Sig Dispense Refill    QUEtiapine (SEROQUEL) 25 MG tablet Take 12.5 mg by mouth 2 times daily      guaiFENesin (ROBITUSSIN) 100 MG/5ML syrup Take 200 mg by mouth 4 times daily as needed for Cough      aspirin 325 MG tablet Take 325 mg by mouth daily      propranolol (INDERAL) 20 MG tablet Take 20 mg by mouth nightly      donepezil (ARICEPT) 5 MG tablet Take 5 mg by mouth nightly      diclofenac sodium (VOLTAREN) 1 % GEL Apply 2 g topically 4 times daily To affected shoulders 240 g 5    acetaminophen (TYLENOL) 500 MG tablet Take 500 mg by mouth 2 times daily      diltiazem (DILACOR XR) 240 MG extended release capsule Take 240 mg by mouth daily      docusate sodium (COLACE) 100 MG capsule Take 100 mg by mouth 2 times daily      hydrochlorothiazide (HYDRODIURIL) 12.5 MG tablet Take 12.5 mg by mouth daily      meclizine (ANTIVERT) 25 MG tablet Take 25 mg by mouth 2 times daily       potassium chloride (KLOR-CON M) 10 MEQ extended release tablet Take 10 mEq by mouth daily      Cholecalciferol (VITAMIN D) 2000 units CAPS capsule Take 2,000 Units by mouth daily       No current facility-administered medications on file prior to visit.          Review of Systems Constitutional: Positive for fatigue. Negative for activity change, appetite change, chills, diaphoresis, fever and unexpected weight change. HENT: Negative for drooling, hearing loss, mouth sores, sore throat, trouble swallowing and voice change. Eyes: Negative for discharge and visual disturbance. Respiratory: Negative for apnea, cough, choking, chest tightness, shortness of breath, wheezing and stridor. Cardiovascular: Negative for chest pain, palpitations and leg swelling. Gastrointestinal: Negative for abdominal distention, abdominal pain, anal bleeding, blood in stool, constipation, diarrhea, nausea, rectal pain and vomiting. Genitourinary: Negative for difficulty urinating, dysuria, enuresis, frequency and hematuria. Musculoskeletal: Positive for arthralgias. Negative for back pain, gait problem, joint swelling and myalgias. Skin: Negative for color change, pallor, rash and wound. Allergic/Immunologic: Negative for food allergies and immunocompromised state. Neurological: Negative for dizziness, tremors, seizures, syncope, facial asymmetry, speech difficulty, weakness, light-headedness, numbness and headaches. Hematological: Negative for adenopathy. Does not bruise/bleed easily. Psychiatric/Behavioral: Positive for hallucinations. Negative for agitation, behavioral problems, confusion, decreased concentration, dysphoric mood, self-injury, sleep disturbance and suicidal ideas. The patient is nervous/anxious. The patient is not hyperactive. Objective: There were no vitals taken for this visit. Physical Exam  Constitutional:       General: She is not in acute distress. Appearance: She is well-developed. She is not diaphoretic. HENT:      Head: Normocephalic and atraumatic. Right Ear: External ear normal.      Left Ear: External ear normal.      Nose: Nose normal.      Mouth/Throat:      Pharynx: No oropharyngeal exudate.    Eyes:      General: No scleral icterus. Right eye: No discharge. Left eye: No discharge. Conjunctiva/sclera: Conjunctivae normal.      Pupils: Pupils are equal, round, and reactive to light. Neck:      Thyroid: No thyromegaly. Vascular: No JVD. Trachea: No tracheal deviation. Cardiovascular:      Rate and Rhythm: Normal rate and regular rhythm. Heart sounds: Murmur heard. Pulmonary:      Effort: Pulmonary effort is normal. No respiratory distress. Breath sounds: Normal breath sounds. No stridor. No wheezing or rales. Chest:      Chest wall: No tenderness. Abdominal:      General: Bowel sounds are normal. There is no distension. Palpations: Abdomen is soft. There is no mass. Tenderness: There is no abdominal tenderness. There is no guarding or rebound. Musculoskeletal:         General: No tenderness or deformity. Normal range of motion. Cervical back: Normal range of motion and neck supple. Lymphadenopathy:      Cervical: No cervical adenopathy. Skin:     General: Skin is warm and dry. Capillary Refill: Capillary refill takes less than 2 seconds. Findings: No erythema or rash. Neurological:      Mental Status: She is alert. Mental status is at baseline. Psychiatric:         Attention and Perception: Attention and perception normal.         Speech: Speech normal.         Behavior: Behavior normal.         Thought Content: Thought content normal.         Assessment:       Diagnosis Orders   1. Late onset Alzheimer's disease with behavioral disturbance ([address]     2. Behavioral and psychological symptoms of dementia ([address]     3. Primary osteoarthritis involving multiple joints     4. Palliative care encounter            Plan:    -Dementia with behavioral disturbance  Monitor for signs and symptoms of delirium as due to advanced age, history of cognitive difficulty, acute illness, and multiple co morbidities patient is high risk.  Offer continuous behavior redirection and orientation, avoid delirium inducing medication, expose to natural sunlight as much as possible, calm surroundings as much as possible. Continue Seroquel 12.5 mg BID, Aricept, and lexapro. Multi-joint pain related to immobility and OA  - Continue Tramadol 50 mg po every 12 hours prn moderate to severe pain  - Continue Acetaminophen and diclofenac gel  - Heat or ice to painful areas, whichever is preferred  - Gentle ROM exercises    Palliative Care  Estimated Fast 6E  PPS 40%  Post Covid Syndrome        No follow-ups on file. Kasi Piña CNP                      Assessment:       Diagnosis Orders   1. Palliative care encounter     2. Pain of both shoulder joints     3. Osteoarthritis of multiple joints, unspecified osteoarthritis type     4. Late onset Alzheimer's disease with behavioral disturbance ([facility]            Plan:      No orders of the defined types were placed in this encounter. No orders of the defined types were placed in this encounter. No follow-ups on file. Kasi Piña, APRN - CNP                [place] [address] [address], [address] [facility]    Subjective:      Patient Id:  Med Russo is a 80 y.o. female who presents today with     Chief Complaint   Patient presents with    Follow-up       HPI     Seen at HOSP DR. BECCA SANTIAGO for symptom management follow up rt behavorial and psychological symptoms of Late onset Alzheimer's disease,OA, HTN, HLD, Vertigo, A fib, depression, and heart valve disease. In wheelchair in room after lunch, I assisted her to a regular chair. She transfers herself with me acting as a guide. Appetite improved, feeds herself. Weight increasing. She is alert, answering questions appropriately, she can make her needs known,    Continues Tylenol 500 mg po Tid,Voltaren gel qid, Tramadol 50 mg po every 12 hours prn moderate to severe pain. able to move both arms today without difficulty, though she cannot raise them Physical Activity:     Days of Exercise per Week:     Minutes of Exercise per Session:    Stress:     Feeling of Stress :    Social Connections:     Frequency of Communication with Friends and Family:     Frequency of Social Gatherings with Friends and Family:     Attends Druze Services:     Active Member of Clubs or Organizations:     Attends Club or Organization Meetings:     Marital Status:    Intimate Partner Violence:     Fear of Current or Ex-Partner:     Emotionally Abused:     Physically Abused:     Sexually Abused:      No family history on file. Allergies   Allergen Reactions    PCN [Penicillins]     Vesicare [Solifenacin]      Current Outpatient Medications on File Prior to Visit   Medication Sig Dispense Refill    QUEtiapine (SEROQUEL) 25 MG tablet Take 12.5 mg by mouth 2 times daily      guaiFENesin (ROBITUSSIN) 100 MG/5ML syrup Take 200 mg by mouth 4 times daily as needed for Cough      aspirin 325 MG tablet Take 325 mg by mouth daily      propranolol (INDERAL) 20 MG tablet Take 20 mg by mouth nightly      donepezil (ARICEPT) 5 MG tablet Take 5 mg by mouth nightly      diclofenac sodium (VOLTAREN) 1 % GEL Apply 2 g topically 4 times daily To affected shoulders 240 g 5    acetaminophen (TYLENOL) 500 MG tablet Take 500 mg by mouth 2 times daily      diltiazem (DILACOR XR) 240 MG extended release capsule Take 240 mg by mouth daily      docusate sodium (COLACE) 100 MG capsule Take 100 mg by mouth 2 times daily      hydrochlorothiazide (HYDRODIURIL) 12.5 MG tablet Take 12.5 mg by mouth daily      meclizine (ANTIVERT) 25 MG tablet Take 25 mg by mouth 2 times daily       potassium chloride (KLOR-CON M) 10 MEQ extended release tablet Take 10 mEq by mouth daily      Cholecalciferol (VITAMIN D) 2000 units CAPS capsule Take 2,000 Units by mouth daily       No current facility-administered medications on file prior to visit.          Review of Systems   Constitutional: Positive for fatigue. Negative for activity change, appetite change, chills, diaphoresis, fever and unexpected weight change. HENT: Negative for drooling, hearing loss, mouth sores, sore throat, trouble swallowing and voice change. Eyes: Negative for discharge and visual disturbance. Respiratory: Negative for apnea, cough, choking, chest tightness, shortness of breath, wheezing and stridor. Cardiovascular: Negative for chest pain, palpitations and leg swelling. Gastrointestinal: Negative for abdominal distention, abdominal pain, anal bleeding, blood in stool, constipation, diarrhea, nausea, rectal pain and vomiting. Genitourinary: Negative for difficulty urinating, dysuria, enuresis, frequency and hematuria. Musculoskeletal: Positive for arthralgias. Negative for back pain, gait problem, joint swelling and myalgias. Skin: Negative for color change, pallor, rash and wound. Allergic/Immunologic: Negative for food allergies and immunocompromised state. Neurological: Negative for dizziness, tremors, seizures, syncope, facial asymmetry, speech difficulty, weakness, light-headedness, numbness and headaches. Hematological: Negative for adenopathy. Does not bruise/bleed easily. Psychiatric/Behavioral: Positive for hallucinations. Negative for agitation, behavioral problems, confusion, decreased concentration, dysphoric mood, self-injury, sleep disturbance and suicidal ideas. The patient is nervous/anxious. The patient is not hyperactive. Objective: There were no vitals taken for this visit. Physical Exam  Constitutional:       General: She is not in acute distress. Appearance: She is well-developed. She is not diaphoretic. HENT:      Head: Normocephalic and atraumatic. Right Ear: External ear normal.      Left Ear: External ear normal.      Nose: Nose normal.      Mouth/Throat:      Pharynx: No oropharyngeal exudate. Eyes:      General: No scleral icterus.         Right eye: No discharge. Left eye: No discharge. Conjunctiva/sclera: Conjunctivae normal.      Pupils: Pupils are equal, round, and reactive to light. Neck:      Thyroid: No thyromegaly. Vascular: No JVD. Trachea: No tracheal deviation. Cardiovascular:      Rate and Rhythm: Normal rate and regular rhythm. Heart sounds: Murmur heard. Pulmonary:      Effort: Pulmonary effort is normal. No respiratory distress. Breath sounds: Normal breath sounds. No stridor. No wheezing or rales. Chest:      Chest wall: No tenderness. Abdominal:      General: Bowel sounds are normal. There is no distension. Palpations: Abdomen is soft. There is no mass. Tenderness: There is no abdominal tenderness. There is no guarding or rebound. Musculoskeletal:         General: No tenderness or deformity. Normal range of motion. Cervical back: Normal range of motion and neck supple. Lymphadenopathy:      Cervical: No cervical adenopathy. Skin:     General: Skin is warm and dry. Capillary Refill: Capillary refill takes less than 2 seconds. Findings: No erythema or rash. Neurological:      Mental Status: She is alert. Mental status is at baseline. Psychiatric:         Attention and Perception: Attention and perception normal.         Speech: Speech normal.         Behavior: Behavior normal.         Thought Content: Thought content normal.         Assessment:       Diagnosis Orders   1. Late onset Alzheimer's disease with behavioral disturbance ([address]     2. Behavioral and psychological symptoms of dementia ([address]     3. Primary osteoarthritis involving multiple joints     4. Palliative care encounter            Plan:    -Dementia with behavioral disturbance  Monitor for signs and symptoms of delirium as due to advanced age, history of cognitive difficulty, acute illness, and multiple co morbidities patient is high risk.  Offer continuous behavior redirection and orientation, avoid delirium inducing medication, expose to natural sunlight as much as possible, calm surroundings as much as possible. Continue Seroquel 12.5 mg BID, Aricept, and lexapro. Multi-joint pain related to immobility and OA  - Continue Tramadol 50 mg po every 12 hours prn moderate to severe pain  - Continue Acetaminophen and diclofenac gel  - Heat or ice to painful areas, whichever is preferred  - Gentle ROM exercises    Palliative Care  Estimated Fast 6E  PPS 50%        No follow-ups on file. Gardenia Yousif [facility] of [address] [place] [id number] [facility], [address]    Subjective:      Patient Id:  Gem Graham is a 80 y.o. female who presents today with   Chief Complaint   Patient presents with    Follow-up       HPI     Seen at HOSP DR. BECCA SANTIAGO for symptom management follow up rt behavorial and psychological symptoms of Late onset Alzheimer's disease,OA, HTN, HLD, Vertigo, A fib, depression, and heart valve disease. Much improved since my last visit, up in chair in dining room. Appetite is much improved, feeds herself. Weight increasing. Continues Tylenol 500 mg po bid, one tramadol at night, and Voltaren gel, able to move both arms today without difficulty, though she cannot raise them above shoulder level without discomfort. I remind staff she can have a tramadol in am if pain is severe during the day. Complains of hallucinations but staff feel they are improved with Seroquel increased to 12.5 mg BID. She is able to be redirected and calmed down when disoriented. No GI or  concerns. No nik blood in stool or urine. SOB and edema at baseline. Negative excessive fatigue, fever, chills, myalgias, increased sputum production or cough, nausea, vomiting, chest pain, or orthopnea. Negative significant Sleep disturbance, depression, anxiety, or agitation episodes. She is looking forward to getting her nails done today.     Past Medical History:   Diagnosis Date    Benign essential HTN 11/17/2019    Constipation 7/26/2016    Detrusor instability 11/17/2019    Dyslipidemia 11/17/2019    Heart valve disease 11/17/2019    Longstanding persistent atrial fibrillation ([facility] 11/17/2019    Primary osteoarthritis involving multiple joints 11/17/2019     No past surgical history on file. Social History     Socioeconomic History    Marital status:      Spouse name: Not on file    Number of children: Not on file    Years of education: Not on file    Highest education level: Not on file   Occupational History    Not on file   Tobacco Use    Smoking status: Never Smoker    Smokeless tobacco: Never Used   Substance and Sexual Activity    Alcohol use: Not on file    Drug use: Not on file    Sexual activity: Not on file   Other Topics Concern    Not on file   Social History Narrative    Not on file     Social Determinants of Health     Financial Resource Strain:     Difficulty of Paying Living Expenses:    Food Insecurity:     Worried About Running Out of Food in the Last Year:     [address] in the Last Year:    Transportation Needs:     Lack of Transportation (Medical):  Lack of Transportation (Non-Medical):    Physical Activity:     Days of Exercise per Week:     Minutes of Exercise per Session:    Stress:     Feeling of Stress :    Social Connections:     Frequency of Communication with Friends and Family:     Frequency of Social Gatherings with Friends and Family:     Attends Jainism Services:     Active Member of Clubs or Organizations:     Attends Club or Organization Meetings:     Marital Status:    Intimate Partner Violence:     Fear of Current or Ex-Partner:     Emotionally Abused:     Physically Abused:     Sexually Abused:      No family history on file.   Allergies   Allergen Reactions    Pcn [Penicillins]     Vesicare [Solifenacin]      Current Outpatient Medications on File Prior to Visit   Medication Sig Dispense Refill    QUEtiapine (SEROQUEL) 25 MG tablet Take 12.5 mg by mouth 2 times daily      guaiFENesin (ROBITUSSIN) 100 MG/5ML syrup Take 200 mg by mouth 4 times daily as needed for Cough      aspirin 325 MG tablet Take 325 mg by mouth daily      propranolol (INDERAL) 20 MG tablet Take 20 mg by mouth nightly      donepezil (ARICEPT) 5 MG tablet Take 5 mg by mouth nightly      diclofenac sodium (VOLTAREN) 1 % GEL Apply 2 g topically 4 times daily To affected shoulders 240 g 5    acetaminophen (TYLENOL) 500 MG tablet Take 500 mg by mouth 2 times daily      diltiazem (DILACOR XR) 240 MG extended release capsule Take 240 mg by mouth daily      docusate sodium (COLACE) 100 MG capsule Take 100 mg by mouth 2 times daily      hydrochlorothiazide (HYDRODIURIL) 12.5 MG tablet Take 12.5 mg by mouth daily      meclizine (ANTIVERT) 25 MG tablet Take 25 mg by mouth 2 times daily       potassium chloride (KLOR-CON M) 10 MEQ extended release tablet Take 10 mEq by mouth daily      Cholecalciferol (VITAMIN D) 2000 units CAPS capsule Take 2,000 Units by mouth daily       No current facility-administered medications on file prior to visit. Review of Systems   Constitutional: Positive for fatigue. Negative for activity change, appetite change, chills, diaphoresis, fever and unexpected weight change. HENT: Negative for drooling, hearing loss, mouth sores, sore throat, trouble swallowing and voice change. Eyes: Negative for discharge and visual disturbance. Respiratory: Negative for apnea, cough, choking, chest tightness, shortness of breath, wheezing and stridor. Cardiovascular: Negative for chest pain, palpitations and leg swelling. Gastrointestinal: Negative for abdominal distention, abdominal pain, anal bleeding, blood in stool, constipation, diarrhea, nausea, rectal pain and vomiting.    Genitourinary: Negative for difficulty urinating, dysuria, enuresis, frequency and hematuria. Musculoskeletal: Positive for arthralgias. Negative for back pain, gait problem, joint swelling and myalgias. Skin: Negative for color change, pallor, rash and wound. Allergic/Immunologic: Negative for food allergies and immunocompromised state. Neurological: Negative for dizziness, tremors, seizures, syncope, facial asymmetry, speech difficulty, weakness, light-headedness, numbness and headaches. Hematological: Negative for adenopathy. Does not bruise/bleed easily. Psychiatric/Behavioral: Positive for hallucinations. Negative for agitation, behavioral problems, confusion, decreased concentration, dysphoric mood, self-injury, sleep disturbance and suicidal ideas. The patient is nervous/anxious. The patient is not hyperactive. Objective: There were no vitals taken for this visit. Physical Exam  Constitutional:       General: She is not in acute distress. Appearance: She is well-developed. She is not diaphoretic. HENT:      Head: Normocephalic and atraumatic. Right Ear: External ear normal.      Left Ear: External ear normal.      Nose: Nose normal.      Mouth/Throat:      Pharynx: No oropharyngeal exudate. Eyes:      General: No scleral icterus. Right eye: No discharge. Left eye: No discharge. Conjunctiva/sclera: Conjunctivae normal.      Pupils: Pupils are equal, round, and reactive to light. Neck:      Thyroid: No thyromegaly. Vascular: No JVD. Trachea: No tracheal deviation. Cardiovascular:      Rate and Rhythm: Normal rate and regular rhythm. Heart sounds: Murmur heard. Pulmonary:      Effort: Pulmonary effort is normal. No respiratory distress. Breath sounds: Normal breath sounds. No stridor. No wheezing or rales. Chest:      Chest wall: No tenderness. Abdominal:      General: Bowel sounds are normal. There is no distension. Palpations: Abdomen is soft. There is no mass. Tenderness:  There is no abdominal tenderness. There is no guarding or rebound. Musculoskeletal:         General: No tenderness or deformity. Normal range of motion. Cervical back: Normal range of motion and neck supple. Lymphadenopathy:      Cervical: No cervical adenopathy. Skin:     General: Skin is warm and dry. Capillary Refill: Capillary refill takes less than 2 seconds. Findings: No erythema or rash. Neurological:      Mental Status: She is alert. Mental status is at baseline. Psychiatric:         Attention and Perception: Attention and perception normal.         Speech: Speech normal.         Behavior: Behavior normal.         Thought Content: Thought content normal.         Assessment:       Diagnosis Orders   1. Late onset Alzheimer's disease with behavioral disturbance ([facility]     2. Behavioral and psychological symptoms of dementia ([facility]     3. Primary osteoarthritis involving multiple joints     4. Palliative care encounter            Plan:    -Dementia with behavioral disturbance  Monitor for signs and symptoms of delirium as due to advanced age, history of cognitive difficulty, acute illness, and multiple co morbidities patient is high risk. Offer continuous behavior redirection and orientation, avoid delirium inducing medication, expose to natural sunlight as much as possible, calm surroundings as much as possible. Continue Seroquel 12.5 mg BID, Aricept, and lexapro. Multi-joint pain related to immobility and OA  - Continue Tramadol 50 mg po every 12 hours prn moderate to severe pain  - Continue Acetaminophen and diclofenac gel  - Heat or ice to painful areas, whichever is preferred  - Gentle ROM exercises    Palliative Care  Estimated Fast 6E  PPS 40%  Post Covid Syndrome        No follow-ups on file. Jessica Ortiz CNP                      Assessment:       Diagnosis Orders   1. Late onset Alzheimer's disease with behavioral disturbance ([facility] [facility]     2.  Behavioral and psychological symptoms of dementia ([facility]     3. Primary osteoarthritis involving multiple joints     4. Palliative care encounter     5. Arthralgia, unspecified joint            Plan:      No orders of the defined types were placed in this encounter. No orders of the defined types were placed in this encounter. No follow-ups on file. JESSENIA Hayes - YANET                [facility] of [address] [place] [id number] [facility], [address]    Subjective:      Patient Id:  Yassine Rose is a 80 y.o. female who presents today with   Chief Complaint   Patient presents with    Follow-up       HPI     Seen at HOSP DR. BECCA SANTIAGO for symptom management follow up rt behavorial and psychological symptoms of Late onset Alzheimer's disease,OA, HTN, HLD, Vertigo, A fib, depression, and heart valve disease. Much improved since my last visit, up in chair in dining room. Appetite is much improved, feeds herself. Weight increasing. Continues Tylenol 500 mg po bid, one tramadol at night, and Voltaren gel, able to move both arms today without difficulty, though she cannot raise them above shoulder level without discomfort. I remind staff she can have a tramadol in am if pain is severe during the day. Complains of hallucinations but staff feel they are improved with Seroquel increased to 12.5 mg BID. She is able to be redirected and calmed down when disoriented. No GI or  concerns. No nik blood in stool or urine. SOB and edema at baseline. Negative excessive fatigue, fever, chills, myalgias, increased sputum production or cough, nausea, vomiting, chest pain, or orthopnea. Negative significant Sleep disturbance, depression, anxiety, or agitation episodes. She is looking forward to getting her nails done today.     Past Medical History:   Diagnosis Date    Benign essential HTN 11/17/2019    Constipation 7/26/2016    Detrusor instability 11/17/2019    Dyslipidemia 11/17/2019    Heart valve disease 11/17/2019    Longstanding persistent atrial fibrillation ([address] 11/17/2019    Primary osteoarthritis involving multiple joints 11/17/2019     No past surgical history on file. Social History     Socioeconomic History    Marital status:      Spouse name: Not on file    Number of children: Not on file    Years of education: Not on file    Highest education level: Not on file   Occupational History    Not on file   Tobacco Use    Smoking status: Never Smoker    Smokeless tobacco: Never Used   Substance and Sexual Activity    Alcohol use: Not on file    Drug use: Not on file    Sexual activity: Not on file   Other Topics Concern    Not on file   Social History Narrative    Not on file     Social Determinants of Health     Financial Resource Strain:     Difficulty of Paying Living Expenses:    Food Insecurity:     Worried About Running Out of Food in the Last Year:     [address] in the Last Year:    Transportation Needs:     Lack of Transportation (Medical):  Lack of Transportation (Non-Medical):    Physical Activity:     Days of Exercise per Week:     Minutes of Exercise per Session:    Stress:     Feeling of Stress :    Social Connections:     Frequency of Communication with Friends and Family:     Frequency of Social Gatherings with Friends and Family:     Attends Temple Services:     Active Member of Clubs or Organizations:     Attends Club or Organization Meetings:     Marital Status:    Intimate Partner Violence:     Fear of Current or Ex-Partner:     Emotionally Abused:     Physically Abused:     Sexually Abused:      No family history on file.   Allergies   Allergen Reactions    Pcn [Penicillins]     Vesicare [Solifenacin]      Current Outpatient Medications on File Prior to Visit   Medication Sig Dispense Refill    QUEtiapine (SEROQUEL) 25 MG tablet Take 12.5 mg by mouth 2 times daily      guaiFENesin (ROBITUSSIN) 100 MG/5ML syrup Take 200 mg by mouth 4 times daily as needed for Cough      aspirin 325 MG tablet Take 325 mg by mouth daily      propranolol (INDERAL) 20 MG tablet Take 20 mg by mouth nightly      donepezil (ARICEPT) 5 MG tablet Take 5 mg by mouth nightly      diclofenac sodium (VOLTAREN) 1 % GEL Apply 2 g topically 4 times daily To affected shoulders 240 g 5    acetaminophen (TYLENOL) 500 MG tablet Take 500 mg by mouth 2 times daily      diltiazem (DILACOR XR) 240 MG extended release capsule Take 240 mg by mouth daily      docusate sodium (COLACE) 100 MG capsule Take 100 mg by mouth 2 times daily      hydrochlorothiazide (HYDRODIURIL) 12.5 MG tablet Take 12.5 mg by mouth daily      meclizine (ANTIVERT) 25 MG tablet Take 25 mg by mouth 2 times daily       potassium chloride (KLOR-CON M) 10 MEQ extended release tablet Take 10 mEq by mouth daily      Cholecalciferol (VITAMIN D) 2000 units CAPS capsule Take 2,000 Units by mouth daily       No current facility-administered medications on file prior to visit. Review of Systems   Constitutional: Positive for fatigue. Negative for activity change, appetite change, chills, diaphoresis, fever and unexpected weight change. HENT: Negative for drooling, hearing loss, mouth sores, sore throat, trouble swallowing and voice change. Eyes: Negative for discharge and visual disturbance. Respiratory: Negative for apnea, cough, choking, chest tightness, shortness of breath, wheezing and stridor. Cardiovascular: Negative for chest pain, palpitations and leg swelling. Gastrointestinal: Negative for abdominal distention, abdominal pain, anal bleeding, blood in stool, constipation, diarrhea, nausea, rectal pain and vomiting. Genitourinary: Negative for difficulty urinating, dysuria, enuresis, frequency and hematuria. Musculoskeletal: Positive for arthralgias. Negative for back pain, gait problem, joint swelling and myalgias. Skin: Negative for color change, pallor, rash and wound. Allergic/Immunologic: Negative for food allergies and immunocompromised state. Neurological: Negative for dizziness, tremors, seizures, syncope, facial asymmetry, speech difficulty, weakness, light-headedness, numbness and headaches. Hematological: Negative for adenopathy. Does not bruise/bleed easily. Psychiatric/Behavioral: Positive for hallucinations. Negative for agitation, behavioral problems, confusion, decreased concentration, dysphoric mood, self-injury, sleep disturbance and suicidal ideas. The patient is nervous/anxious. The patient is not hyperactive. Objective: There were no vitals taken for this visit. Physical Exam  Constitutional:       General: She is not in acute distress. Appearance: She is well-developed. She is not diaphoretic. HENT:      Head: Normocephalic and atraumatic. Right Ear: External ear normal.      Left Ear: External ear normal.      Nose: Nose normal.      Mouth/Throat:      Pharynx: No oropharyngeal exudate. Eyes:      General: No scleral icterus. Right eye: No discharge. Left eye: No discharge. Conjunctiva/sclera: Conjunctivae normal.      Pupils: Pupils are equal, round, and reactive to light. Neck:      Thyroid: No thyromegaly. Vascular: No JVD. Trachea: No tracheal deviation. Cardiovascular:      Rate and Rhythm: Normal rate and regular rhythm. Heart sounds: Murmur heard. Pulmonary:      Effort: Pulmonary effort is normal. No respiratory distress. Breath sounds: Normal breath sounds. No stridor. No wheezing or rales. Chest:      Chest wall: No tenderness. Abdominal:      General: Bowel sounds are normal. There is no distension. Palpations: Abdomen is soft. There is no mass. Tenderness: There is no abdominal tenderness. There is no guarding or rebound. Musculoskeletal:         General: No tenderness or deformity. Normal range of motion.       Cervical back: Normal range of motion and neck supple. Lymphadenopathy:      Cervical: No cervical adenopathy. Skin:     General: Skin is warm and dry. Capillary Refill: Capillary refill takes less than 2 seconds. Findings: No erythema or rash. Neurological:      Mental Status: She is alert. Mental status is at baseline. Psychiatric:         Attention and Perception: Attention and perception normal.         Speech: Speech normal.         Behavior: Behavior normal.         Thought Content: Thought content normal.         Assessment:       Diagnosis Orders   1. Late onset Alzheimer's disease with behavioral disturbance ([facility]     2. Behavioral and psychological symptoms of dementia ([facility]     3. Primary osteoarthritis involving multiple joints     4. Palliative care encounter            Plan:    -Dementia with behavioral disturbance  Monitor for signs and symptoms of delirium as due to advanced age, history of cognitive difficulty, acute illness, and multiple co morbidities patient is high risk. Offer continuous behavior redirection and orientation, avoid delirium inducing medication, expose to natural sunlight as much as possible, calm surroundings as much as possible. Continue Seroquel 12.5 mg BID, Aricept, and lexapro. Multi-joint pain related to immobility and OA  - Continue Tramadol 50 mg po every 12 hours prn moderate to severe pain  - Continue Acetaminophen and diclofenac gel  - Heat or ice to painful areas, whichever is preferred  - Gentle ROM exercises    Palliative Care  Estimated Fast 6E  PPS 40%  Post Covid Syndrome        No follow-ups on file. Maximiliano Zamora CNP                      Assessment:       Diagnosis Orders   1. Palliative care encounter     2. Pain of both shoulder joints     3. Osteoarthritis of multiple joints, unspecified osteoarthritis type     4.  Late onset Alzheimer's disease with behavioral disturbance ([facility]            Plan:      No orders of the defined types were placed in this encounter. No orders of the defined types were placed in this encounter. No follow-ups on file. Kamille Olmstead, APRN - CNP                [facility] of [address], [address] [facility]    Subjective:      Patient Id:  Alex Marcelo is a 80 y.o. female who presents today with     Chief Complaint   Patient presents with    Follow-up       HPI     Seen at HOSP DR. BECCA SANTIAGO for symptom management follow up rt behavorial and psychological symptoms of Late onset Alzheimer's disease,OA, HTN, HLD, Vertigo, A fib, depression, and heart valve disease. In wheelchair in room after lunch, I assisted her to a regular chair. She transfers herself with me acting as a guide. Appetite improved, feeds herself. Weight increasing. She is alert, answering questions appropriately, she can make her needs known,    Continues Tylenol 500 mg po Tid,Voltaren gel qid, Tramadol 50 mg po every 12 hours prn moderate to severe pain. able to move both arms today without difficulty, though she cannot raise them above shoulder level without discomfort. She tells me her shoulder is very uncomfortable today. Has not used tramadol in a week. I remind staff she can have tramadol for moderate to sever pain such as today. Complains of hallucinations but staff feel they are improved with Seroquel increased to 12.5 mg BID. She is able to be redirected and calmed down when disoriented. No GI or  concerns. No nik blood in stool or urine. SOB and edema at baseline. Negative excessive fatigue, fever, chills, myalgias, increased sputum production or cough, nausea, vomiting, chest pain, or orthopnea. Negative significant Sleep disturbance, depression, anxiety, or agitation episodes.         Past Medical History:   Diagnosis Date    Benign essential HTN 11/17/2019    Constipation 7/26/2016    Detrusor instability 11/17/2019    Dyslipidemia 11/17/2019    Heart valve disease 11/17/2019    [facility] 11/17/2019    Primary osteoarthritis involving multiple joints 11/17/2019     No past surgical history on file. Social History     Socioeconomic History    Marital status:      Spouse name: Not on file    Number of children: Not on file    Years of education: Not on file    Highest education level: Not on file   Occupational History    Not on file   Tobacco Use    Smoking status: Never Smoker    Smokeless tobacco: Never Used   Substance and Sexual Activity    Alcohol use: Not on file    Drug use: Not on file    Sexual activity: Not on file   Other Topics Concern    Not on file   Social History Narrative    Not on file     Social Determinants of Health     Financial Resource Strain:     Difficulty of Paying Living Expenses:    Food Insecurity:     Worried About Running Out of Food in the Last Year:     [address] in the Last Year:    Transportation Needs:     Lack of Transportation (Medical):  Lack of Transportation (Non-Medical):    Physical Activity:     Days of Exercise per Week:     Minutes of Exercise per Session:    Stress:     Feeling of Stress :    Social Connections:     Frequency of Communication with Friends and Family:     Frequency of Social Gatherings with Friends and Family:     Attends Mu-ism Services:     Active Member of Clubs or Organizations:     Attends Club or Organization Meetings:     Marital Status:    Intimate Partner Violence:     Fear of Current or Ex-Partner:     Emotionally Abused:     Physically Abused:     Sexually Abused:      No family history on file.   Allergies   Allergen Reactions    PCN [Penicillins]     Vesicare [Solifenacin]      Current Outpatient Medications on File Prior to Visit   Medication Sig Dispense Refill    QUEtiapine (SEROQUEL) 25 MG tablet Take 12.5 mg by mouth 2 times daily      guaiFENesin (ROBITUSSIN) 100 MG/5ML syrup Take 200 mg by mouth 4 times daily as needed for Cough      aspirin 325 MG tablet Take 325 mg by mouth daily      propranolol (INDERAL) 20 MG tablet Take 20 mg by mouth nightly      donepezil (ARICEPT) 5 MG tablet Take 5 mg by mouth nightly      diclofenac sodium (VOLTAREN) 1 % GEL Apply 2 g topically 4 times daily To affected shoulders 240 g 5    acetaminophen (TYLENOL) 500 MG tablet Take 500 mg by mouth 2 times daily      diltiazem (DILACOR XR) 240 MG extended release capsule Take 240 mg by mouth daily      docusate sodium (COLACE) 100 MG capsule Take 100 mg by mouth 2 times daily      hydrochlorothiazide (HYDRODIURIL) 12.5 MG tablet Take 12.5 mg by mouth daily      meclizine (ANTIVERT) 25 MG tablet Take 25 mg by mouth 2 times daily       potassium chloride (KLOR-CON M) 10 MEQ extended release tablet Take 10 mEq by mouth daily      Cholecalciferol (VITAMIN D) 2000 units CAPS capsule Take 2,000 Units by mouth daily       No current facility-administered medications on file prior to visit. Review of Systems   Constitutional: Positive for fatigue. Negative for activity change, appetite change, chills, diaphoresis, fever and unexpected weight change. HENT: Negative for drooling, hearing loss, mouth sores, sore throat, trouble swallowing and voice change. Eyes: Negative for discharge and visual disturbance. Respiratory: Negative for apnea, cough, choking, chest tightness, shortness of breath, wheezing and stridor. Cardiovascular: Negative for chest pain, palpitations and leg swelling. Gastrointestinal: Negative for abdominal distention, abdominal pain, anal bleeding, blood in stool, constipation, diarrhea, nausea, rectal pain and vomiting. Genitourinary: Negative for difficulty urinating, dysuria, enuresis, frequency and hematuria. Musculoskeletal: Positive for arthralgias. Negative for back pain, gait problem, joint swelling and myalgias. Skin: Negative for color change, pallor, rash and wound.    Allergic/Immunologic: Negative for food allergies and immunocompromised state. Neurological: Negative for dizziness, tremors, seizures, syncope, facial asymmetry, speech difficulty, weakness, light-headedness, numbness and headaches. Hematological: Negative for adenopathy. Does not bruise/bleed easily. Psychiatric/Behavioral: Positive for hallucinations. Negative for agitation, behavioral problems, confusion, decreased concentration, dysphoric mood, self-injury, sleep disturbance and suicidal ideas. The patient is nervous/anxious. The patient is not hyperactive. Objective: There were no vitals taken for this visit. Physical Exam  Constitutional:       General: She is not in acute distress. Appearance: She is well-developed. She is not diaphoretic. HENT:      Head: Normocephalic and atraumatic. Right Ear: External ear normal.      Left Ear: External ear normal.      Nose: Nose normal.      Mouth/Throat:      Pharynx: No oropharyngeal exudate. Eyes:      General: No scleral icterus. Right eye: No discharge. Left eye: No discharge. Conjunctiva/sclera: Conjunctivae normal.      Pupils: Pupils are equal, round, and reactive to light. Neck:      Thyroid: No thyromegaly. Vascular: No JVD. Trachea: No tracheal deviation. Cardiovascular:      Rate and Rhythm: Normal rate and regular rhythm. Heart sounds: Murmur heard. Pulmonary:      Effort: Pulmonary effort is normal. No respiratory distress. Breath sounds: Normal breath sounds. No stridor. No wheezing or rales. Chest:      Chest wall: No tenderness. Abdominal:      General: Bowel sounds are normal. There is no distension. Palpations: Abdomen is soft. There is no mass. Tenderness: There is no abdominal tenderness. There is no guarding or rebound. Musculoskeletal:         General: No tenderness or deformity. Normal range of motion. Cervical back: Normal range of motion and neck supple. Lymphadenopathy:      Cervical: No cervical adenopathy. Skin:     General: Skin is warm and dry. Capillary Refill: Capillary refill takes less than 2 seconds. Findings: No erythema or rash. Neurological:      Mental Status: She is alert. Mental status is at baseline. Psychiatric:         Attention and Perception: Attention and perception normal.         Speech: Speech normal.         Behavior: Behavior normal.         Thought Content: Thought content normal.         Assessment:       Diagnosis Orders   1. Late onset Alzheimer's disease with behavioral disturbance ([facility]     2. Behavioral and psychological symptoms of dementia ([facility]     3. Primary osteoarthritis involving multiple joints     4. Palliative care encounter            Plan:    -Dementia with behavioral disturbance  Monitor for signs and symptoms of delirium as due to advanced age, history of cognitive difficulty, acute illness, and multiple co morbidities patient is high risk. Offer continuous behavior redirection and orientation, avoid delirium inducing medication, expose to natural sunlight as much as possible, calm surroundings as much as possible. Continue Seroquel 12.5 mg BID, Aricept, and lexapro. Multi-joint pain related to immobility and OA  - Continue Tramadol 50 mg po every 12 hours prn moderate to severe pain  - Continue Acetaminophen and diclofenac gel  - Heat or ice to painful areas, whichever is preferred  - Gentle ROM exercises    Palliative Care  Estimated Fast 6E  PPS 50%        No follow-ups on file. [facility] of [address], [address] [facility]    Subjective:      Patient Id:  Judy Lovelace is a 80 y.o. female who presents today with   Chief Complaint   Patient presents with    Follow-up       HPI     Seen at HOSP DR[facility] BECCA SANTIAGO for symptom management follow up rt behavorial and psychological symptoms of Late onset Alzheimer's disease,OA, HTN, HLD, Vertigo, A fib, depression, and heart valve disease. Much improved since my last visit, up in chair in dining room. Appetite is much improved, feeds herself. Weight increasing. Continues Tylenol 500 mg po bid, one tramadol at night, and Voltaren gel, able to move both arms today without difficulty, though she cannot raise them above shoulder level without discomfort. I remind staff she can have a tramadol in am if pain is severe during the day. Complains of hallucinations but staff feel they are improved with Seroquel increased to 12.5 mg BID. She is able to be redirected and calmed down when disoriented. No GI or  concerns. No nik blood in stool or urine. SOB and edema at baseline. Negative excessive fatigue, fever, chills, myalgias, increased sputum production or cough, nausea, vomiting, chest pain, or orthopnea. Negative significant Sleep disturbance, depression, anxiety, or agitation episodes. She is looking forward to getting her nails done today. Past Medical History:   Diagnosis Date    Benign essential HTN 11/17/2019    Constipation 7/26/2016    Detrusor instability 11/17/2019    Dyslipidemia 11/17/2019    Heart valve disease 11/17/2019    Longstanding persistent atrial fibrillation ([facility] 11/17/2019    Primary osteoarthritis involving multiple joints 11/17/2019     No past surgical history on file. Social History     Socioeconomic History    Marital status:       Spouse name: Not on file    Number of children: Not on file    Years of education: Not on file    Highest education level: Not on file   Occupational History    Not on file   Tobacco Use    Smoking status: Never Smoker    Smokeless tobacco: Never Used   Substance and Sexual Activity    Alcohol use: Not on file    Drug use: Not on file    Sexual activity: Not on file   Other Topics Concern    Not on file   Social History Narrative    Not on file     Social Determinants of Health     Financial Resource Strain:     Difficulty of Paying Living Expenses:    Food Insecurity:     Worried About Running Out of Food in the Last Year:     [address] in the Last Year:    Transportation Needs:     Lack of Transportation (Medical):  Lack of Transportation (Non-Medical):    Physical Activity:     Days of Exercise per Week:     Minutes of Exercise per Session:    Stress:     Feeling of Stress :    Social Connections:     Frequency of Communication with Friends and Family:     Frequency of Social Gatherings with Friends and Family:     Attends Congregation Services:     Active Member of Clubs or Organizations:     Attends Club or Organization Meetings:     Marital Status:    Intimate Partner Violence:     Fear of Current or Ex-Partner:     Emotionally Abused:     Physically Abused:     Sexually Abused:      No family history on file.   Allergies   Allergen Reactions    Pcn [Penicillins]     Vesicare [Solifenacin]      Current Outpatient Medications on File Prior to Visit   Medication Sig Dispense Refill    QUEtiapine (SEROQUEL) 25 MG tablet Take 12.5 mg by mouth 2 times daily      guaiFENesin (ROBITUSSIN) 100 MG/5ML syrup Take 200 mg by mouth 4 times daily as needed for Cough      aspirin 325 MG tablet Take 325 mg by mouth daily      propranolol (INDERAL) 20 MG tablet Take 20 mg by mouth nightly      donepezil (ARICEPT) 5 MG tablet Take 5 mg by mouth nightly      diclofenac sodium (VOLTAREN) 1 % GEL Apply 2 g topically 4 times daily To affected shoulders 240 g 5    acetaminophen (TYLENOL) 500 MG tablet Take 500 mg by mouth 2 times daily      diltiazem (DILACOR XR) 240 MG extended release capsule Take 240 mg by mouth daily      docusate sodium (COLACE) 100 MG capsule Take 100 mg by mouth 2 times daily      hydrochlorothiazide (HYDRODIURIL) 12.5 MG tablet Take 12.5 mg by mouth daily      meclizine (ANTIVERT) 25 MG tablet Take 25 mg by mouth 2 times daily       potassium chloride (KLOR-CON M) 10 MEQ extended release tablet Take 10 mEq by mouth daily      Cholecalciferol (VITAMIN D) 2000 units CAPS capsule Take 2,000 Units by mouth daily       No current facility-administered medications on file prior to visit. Review of Systems   Constitutional: Positive for fatigue. Negative for activity change, appetite change, chills, diaphoresis, fever and unexpected weight change. HENT: Negative for drooling, hearing loss, mouth sores, sore throat, trouble swallowing and voice change. Eyes: Negative for discharge and visual disturbance. Respiratory: Negative for apnea, cough, choking, chest tightness, shortness of breath, wheezing and stridor. Cardiovascular: Negative for chest pain, palpitations and leg swelling. Gastrointestinal: Negative for abdominal distention, abdominal pain, anal bleeding, blood in stool, constipation, diarrhea, nausea, rectal pain and vomiting. Genitourinary: Negative for difficulty urinating, dysuria, enuresis, frequency and hematuria. Musculoskeletal: Positive for arthralgias. Negative for back pain, gait problem, joint swelling and myalgias. Skin: Negative for color change, pallor, rash and wound. Allergic/Immunologic: Negative for food allergies and immunocompromised state. Neurological: Negative for dizziness, tremors, seizures, syncope, facial asymmetry, speech difficulty, weakness, light-headedness, numbness and headaches. Hematological: Negative for adenopathy. Does not bruise/bleed easily. Psychiatric/Behavioral: Positive for hallucinations. Negative for agitation, behavioral problems, confusion, decreased concentration, dysphoric mood, self-injury, sleep disturbance and suicidal ideas. The patient is nervous/anxious. The patient is not hyperactive. Objective: There were no vitals taken for this visit. Physical Exam  Constitutional:       General: She is not in acute distress. Appearance: She is well-developed. She is not diaphoretic. HENT:      Head: Normocephalic and atraumatic. Right Ear: External ear normal.      Left Ear: External ear normal.      Nose: Nose normal.      Mouth/Throat:      Pharynx: No oropharyngeal exudate. Eyes:      General: No scleral icterus. Right eye: No discharge. Left eye: No discharge. Conjunctiva/sclera: Conjunctivae normal.      Pupils: Pupils are equal, round, and reactive to light. Neck:      Thyroid: No thyromegaly. Vascular: No JVD. Trachea: No tracheal deviation. Cardiovascular:      Rate and Rhythm: Normal rate and regular rhythm. Heart sounds: Murmur heard. Pulmonary:      Effort: Pulmonary effort is normal. No respiratory distress. Breath sounds: Normal breath sounds. No stridor. No wheezing or rales. Chest:      Chest wall: No tenderness. Abdominal:      General: Bowel sounds are normal. There is no distension. Palpations: Abdomen is soft. There is no mass. Tenderness: There is no abdominal tenderness. There is no guarding or rebound. Musculoskeletal:         General: No tenderness or deformity. Normal range of motion. Cervical back: Normal range of motion and neck supple. Lymphadenopathy:      Cervical: No cervical adenopathy. Skin:     General: Skin is warm and dry. Capillary Refill: Capillary refill takes less than 2 seconds. Findings: No erythema or rash. Neurological:      Mental Status: She is alert. Mental status is at baseline. Psychiatric:         Attention and Perception: Attention and perception normal.         Speech: Speech normal.         Behavior: Behavior normal.         Thought Content: Thought content normal.         Assessment:       Diagnosis Orders   1. Late onset Alzheimer's disease with behavioral disturbance ([facility]     2. Behavioral and psychological symptoms of dementia ([facility]     3.  Primary osteoarthritis involving multiple joints     4. Palliative care encounter            Plan:    -Dementia with behavioral disturbance  Monitor for signs and symptoms of delirium as due to advanced age, history of cognitive difficulty, acute illness, and multiple co morbidities patient is high risk. Offer continuous behavior redirection and orientation, avoid delirium inducing medication, expose to natural sunlight as much as possible, calm surroundings as much as possible. Continue Seroquel 12.5 mg BID, Aricept, and lexapro. Multi-joint pain related to immobility and OA  - Continue Tramadol 50 mg po every 12 hours prn moderate to severe pain  - Continue Acetaminophen and diclofenac gel  - Heat or ice to painful areas, whichever is preferred  - Gentle ROM exercises    Palliative Care  Estimated Fast 6E  PPS 40%  Post Covid Syndrome        No follow-ups on file. Lui Bell CNP                      Assessment:       Diagnosis Orders   1. Late onset Alzheimer's disease with behavioral disturbance ([facility] [address]     2. Behavioral and psychological symptoms of dementia ([facility] [address]     3. Primary osteoarthritis involving multiple joints     4. Palliative care encounter     5. Arthralgia, unspecified joint            Plan:      No orders of the defined types were placed in this encounter. No orders of the defined types were placed in this encounter. No follow-ups on file. Lui Bell, APRN - CNP                [place] [address] [address], [address] [facility]    Subjective:      Patient Id:  Juani Strange is a 80 y.o. female who presents today with   Chief Complaint   Patient presents with    Follow-up       HPI     Seen at [facility] DR[facility] BECCA SANTIAGO for symptom management follow up rt behavorial and psychological symptoms of Late onset Alzheimer's disease,OA, HTN, HLD, Vertigo, A fib, depression, and heart valve disease. Much improved since my last visit, up in chair in dining room.  Appetite is much improved, feeds herself. Weight increasing. Continues Tylenol 500 mg po bid, one tramadol at night, and Voltaren gel, able to move both arms today without difficulty, though she cannot raise them above shoulder level without discomfort. I remind staff she can have a tramadol in am if pain is severe during the day. Complains of hallucinations but staff feel they are improved with Seroquel increased to 12.5 mg BID. She is able to be redirected and calmed down when disoriented. No GI or  concerns. No nik blood in stool or urine. SOB and edema at baseline. Negative excessive fatigue, fever, chills, myalgias, increased sputum production or cough, nausea, vomiting, chest pain, or orthopnea. Negative significant Sleep disturbance, depression, anxiety, or agitation episodes. She is looking forward to getting her nails done today. Past Medical History:   Diagnosis Date    Benign essential HTN 11/17/2019    Constipation 7/26/2016    Detrusor instability 11/17/2019    Dyslipidemia 11/17/2019    Heart valve disease 11/17/2019    Longstanding persistent atrial fibrillation ([facility] [address] 11/17/2019    Primary osteoarthritis involving multiple joints 11/17/2019     No past surgical history on file. Social History     Socioeconomic History    Marital status:       Spouse name: Not on file    Number of children: Not on file    Years of education: Not on file    Highest education level: Not on file   Occupational History    Not on file   Tobacco Use    Smoking status: Never Smoker    Smokeless tobacco: Never Used   Substance and Sexual Activity    Alcohol use: Not on file    Drug use: Not on file    Sexual activity: Not on file   Other Topics Concern    Not on file   Social History Narrative    Not on file     Social Determinants of Health     Financial Resource Strain:     Difficulty of Paying Living Expenses:    Food Insecurity:     Worried About Running Out of Food in the Last Year:     Ran Out of Food in the Last Year:    Transportation Needs:     Lack of Transportation (Medical):  Lack of Transportation (Non-Medical):    Physical Activity:     Days of Exercise per Week:     Minutes of Exercise per Session:    Stress:     Feeling of Stress :    Social Connections:     Frequency of Communication with Friends and Family:     Frequency of Social Gatherings with Friends and Family:     Attends Nondenominational Services:     Active Member of Clubs or Organizations:     Attends Club or Organization Meetings:     Marital Status:    Intimate Partner Violence:     Fear of Current or Ex-Partner:     Emotionally Abused:     Physically Abused:     Sexually Abused:      No family history on file.   Allergies   Allergen Reactions    Pcn [Penicillins]     Vesicare [Solifenacin]      Current Outpatient Medications on File Prior to Visit   Medication Sig Dispense Refill    QUEtiapine (SEROQUEL) 25 MG tablet Take 12.5 mg by mouth 2 times daily      guaiFENesin (ROBITUSSIN) 100 MG/5ML syrup Take 200 mg by mouth 4 times daily as needed for Cough      aspirin 325 MG tablet Take 325 mg by mouth daily      propranolol (INDERAL) 20 MG tablet Take 20 mg by mouth nightly      donepezil (ARICEPT) 5 MG tablet Take 5 mg by mouth nightly      diclofenac sodium (VOLTAREN) 1 % GEL Apply 2 g topically 4 times daily To affected shoulders 240 g 5    acetaminophen (TYLENOL) 500 MG tablet Take 500 mg by mouth 2 times daily      diltiazem (DILACOR XR) 240 MG extended release capsule Take 240 mg by mouth daily      docusate sodium (COLACE) 100 MG capsule Take 100 mg by mouth 2 times daily      hydrochlorothiazide (HYDRODIURIL) 12.5 MG tablet Take 12.5 mg by mouth daily      meclizine (ANTIVERT) 25 MG tablet Take 25 mg by mouth 2 times daily       potassium chloride (KLOR-CON M) 10 MEQ extended release tablet Take 10 mEq by mouth daily      Cholecalciferol (VITAMIN D) 2000 units CAPS capsule Take 2,000 Units by mouth daily       No current facility-administered medications on file prior to visit. Review of Systems   Constitutional: Positive for fatigue. Negative for activity change, appetite change, chills, diaphoresis, fever and unexpected weight change. HENT: Negative for drooling, hearing loss, mouth sores, sore throat, trouble swallowing and voice change. Eyes: Negative for discharge and visual disturbance. Respiratory: Negative for apnea, cough, choking, chest tightness, shortness of breath, wheezing and stridor. Cardiovascular: Negative for chest pain, palpitations and leg swelling. Gastrointestinal: Negative for abdominal distention, abdominal pain, anal bleeding, blood in stool, constipation, diarrhea, nausea, rectal pain and vomiting. Genitourinary: Negative for difficulty urinating, dysuria, enuresis, frequency and hematuria. Musculoskeletal: Positive for arthralgias. Negative for back pain, gait problem, joint swelling and myalgias. Skin: Negative for color change, pallor, rash and wound. Allergic/Immunologic: Negative for food allergies and immunocompromised state. Neurological: Negative for dizziness, tremors, seizures, syncope, facial asymmetry, speech difficulty, weakness, light-headedness, numbness and headaches. Hematological: Negative for adenopathy. Does not bruise/bleed easily. Psychiatric/Behavioral: Positive for hallucinations. Negative for agitation, behavioral problems, confusion, decreased concentration, dysphoric mood, self-injury, sleep disturbance and suicidal ideas. The patient is nervous/anxious. The patient is not hyperactive. Objective: There were no vitals taken for this visit. Physical Exam  Constitutional:       General: She is not in acute distress. Appearance: She is well-developed. She is not diaphoretic. HENT:      Head: Normocephalic and atraumatic.       Right Ear: External ear normal.      Left Ear: External ear normal. Nose: Nose normal.      Mouth/Throat:      Pharynx: No oropharyngeal exudate. Eyes:      General: No scleral icterus. Right eye: No discharge. Left eye: No discharge. Conjunctiva/sclera: Conjunctivae normal.      Pupils: Pupils are equal, round, and reactive to light. Neck:      Thyroid: No thyromegaly. Vascular: No JVD. Trachea: No tracheal deviation. Cardiovascular:      Rate and Rhythm: Normal rate and regular rhythm. Heart sounds: Murmur heard. Pulmonary:      Effort: Pulmonary effort is normal. No respiratory distress. Breath sounds: Normal breath sounds. No stridor. No wheezing or rales. Chest:      Chest wall: No tenderness. Abdominal:      General: Bowel sounds are normal. There is no distension. Palpations: Abdomen is soft. There is no mass. Tenderness: There is no abdominal tenderness. There is no guarding or rebound. Musculoskeletal:         General: No tenderness or deformity. Normal range of motion. Cervical back: Normal range of motion and neck supple. Lymphadenopathy:      Cervical: No cervical adenopathy. Skin:     General: Skin is warm and dry. Capillary Refill: Capillary refill takes less than 2 seconds. Findings: No erythema or rash. Neurological:      Mental Status: She is alert. Mental status is at baseline. Psychiatric:         Attention and Perception: Attention and perception normal.         Speech: Speech normal.         Behavior: Behavior normal.         Thought Content: Thought content normal.         Assessment:       Diagnosis Orders   1. Late onset Alzheimer's disease with behavioral disturbance ([facility] [address]     2. Behavioral and psychological symptoms of dementia ([facility] [address]     3. Primary osteoarthritis involving multiple joints     4.  Palliative care encounter            Plan:    -Dementia with behavioral disturbance  Monitor for signs and symptoms of delirium as due to advanced age, history of cognitive difficulty, acute illness, and multiple co morbidities patient is high risk. Offer continuous behavior redirection and orientation, avoid delirium inducing medication, expose to natural sunlight as much as possible, calm surroundings as much as possible. Continue Seroquel 12.5 mg BID, Aricept, and lexapro. Multi-joint pain related to immobility and OA  - Continue Tramadol 50 mg po every 12 hours prn moderate to severe pain  - Continue Acetaminophen and diclofenac gel  - Heat or ice to painful areas, whichever is preferred  - Gentle ROM exercises    Palliative Care  Estimated Fast 6E  PPS 40%  Post Covid Syndrome        No follow-ups on file. Kimberlee Covarrubias CNP                      Assessment:       Diagnosis Orders   1. Palliative care encounter     2. Pain of both shoulder joints     3. Osteoarthritis of multiple joints, unspecified osteoarthritis type     4. Late onset Alzheimer's disease with behavioral disturbance ([facility] [address]            Plan:      No orders of the defined types were placed in this encounter. No orders of the defined types were placed in this encounter. No follow-ups on file. Kimberlee Covarrubias, JESSENIA - CNP            Total Time 45 mins      Total time includes reviewing labs, reviewing history, medications, and allergies, counseling patient, performing medically appropriate evaluation and examination, discussing care with healthcare providers,family, and documenting in the medical record. Total Time 45 mins      Total time includes reviewing labs, reviewing history, medications, and allergies, counseling patient, performing medically appropriate evaluation and examination, discussing care with healthcare providers,family, and documenting in the medical record.

## 2022-05-27 NOTE — PROGRESS NOTES
[facility]  [address]      Assisted living apartment    5/11/2022    Shelia Malik  is a 80 y.o. in the NF being seen for a f/u of   Chief Complaint   Patient presents with    3 Month Follow-Up     arthritis pain       Spoke to nursing and ancillary staff regarding patient status, present state of health. Need for nursing care and assistance. Reviewed chart, labs, medications in NF chart and allergies. Aging in place at this time. HPI lives in  assisted living. Being seen for chronic illnesses. Reports from nurse, pt is at baseline, no change in vision, hearing. No headaches or choking issues. Uses  wheel chair for locomotion in facility. Does not use walker much any more, is also more forgetful. Pt states she knows she is losing her memory more each day . C/o only  Of right shoulder and neck pain, uses heating pad from microwave with good relief. No decrease in appetite, no change in B/B habits. NO fevers. Can make needs known to nurse. No recent falls or injuries. Still  likes to eat in dining area with others. Has less hallucinations. Past Medical History:   Diagnosis Date    Benign essential HTN 11/17/2019    Constipation 7/26/2016    Detrusor instability 11/17/2019    Dyslipidemia 11/17/2019    Heart valve disease 11/17/2019    Longstanding persistent atrial fibrillation ([facility] [address] 11/17/2019    Primary osteoarthritis involving multiple joints 11/17/2019     No past surgical history on file. No family history on file. Social History     Socioeconomic History    Marital status:       Spouse name: Not on file    Number of children: Not on file    Years of education: Not on file    Highest education level: Not on file   Occupational History    Not on file   Tobacco Use    Smoking status: Never Smoker    Smokeless tobacco: Never Used   Substance and Sexual Activity    Alcohol use: Not on file    Drug use: Not on file    Sexual activity: Not on file   Other Topics Concern    Not on file   Social History Narrative    Not on file     Social Determinants of Health     Financial Resource Strain:     Difficulty of Paying Living Expenses: Not on file   Food Insecurity:     Worried About Running Out of Food in the Last Year: Not on file    Rory of Food in the Last Year: Not on file   Transportation Needs:     Lack of Transportation (Medical): Not on file    Lack of Transportation (Non-Medical): Not on file   Physical Activity:     Days of Exercise per Week: Not on file    Minutes of Exercise per Session: Not on file   Stress:     Feeling of Stress : Not on file   Social Connections:     Frequency of Communication with Friends and Family: Not on file    Frequency of Social Gatherings with Friends and Family: Not on file    Attends Mosque Services: Not on file    Active Member of [address] [place] [address] [employer] or Organizations: Not on file    Attends Club or Organization Meetings: Not on file    Marital Status: Not on file   Intimate Partner Violence:     Fear of Current or Ex-Partner: Not on file    Emotionally Abused: Not on file    Physically Abused: Not on file    Sexually Abused: Not on file   Housing Stability:     Unable to Pay for Housing in the Last Year: Not on file    Number of [place] in the Last Year: Not on file    Unstable Housing in the Last Year: Not on file       Allergies: Pcn [penicillins] and Vesicare [solifenacin]  NF MEDICATIONS REVIEWED    ROS:  See HPI  Constitutional: There are no reports of behavioral issues, change in appetite, fever, but has increase in general weakness. No gross bleeding issues. Respiratory: denies SOB, dyspnea,   Cardiovascular: denies CP, lightheadedness,   GI: No reports of change of bowel habits, no N/V/D/C.    : no reports of change in bladder habits  Extremities: No reports of uncontrolled pain issues, has   chronic edema ankles and shins  Skin: denies wounds  Psych: no reports of unacceptable behaviors, she reports of depression but states she is not sure if she is \"sleepy or sad\"    Physical exam:   VS  151lb  97.7-74-16    130/66  Constitutional: Awake and alert sitting up in chair,  neat appearance, good grooming, heating pad to right shoulder with relief. HEENT: normocephalic, PEAR, MMM, no cyanosis. NO neck mass visualized , facial asymmetry  Present and unchanged. Cardiovascular: Regular rate  Respiratory: LCTA, unlabored respirations  GI: abdomen ND  : no bladder tenderness, no urine incontinence at my exam   Extremities: trace edema,  Psych: pleasant and able to follow simple commands, normal thought content, speech clear     ASSESSMENT:     Diagnosis Orders   1. Chronic right shoulder pain     2. Current mild episode of major depressive disorder without prior episode ([facility] [address]     3. Chronic atrial fibrillation ([facility] [address]     4. Late onset Alzheimer's disease with behavioral disturbance (H[facility])         PLAN:   Heat pad and creams are effective  She is a palliative care pt has ultram if needed  Depression is mild and no suicidal ideation or plans. No changes at this time. Continue seroquel 12.5mg bid may consider reduction to once daily if she remains 'tired or sleepy'  Also consider antivert tid making her tired. She has good heart rate, same diltiazem, no anticoags due to high risk bleed, falls. Return prn or 3 months     Pertinent POC, labs, have been reviewed, continue same. Encourage fluids and good nutrition. Stress fall prevention strategies. Pt/POA agrees to [address], APRN-CNP      Ivonne Monday, DNP, MSN, RN, GNP-BC, NP-C  Adult/Kishore Nurse Practitioner  [address] [facility], [facility]  Department of Geriatrics  8:30am-4:30pm   [phone]  After hours Answering service [phone]      Please note this report is partially produced by using speech recognition hardware.   It may contain errors related to the system, including grammar, punctuation and spelling as well as words and phrases that may seem inaccurate.   For any questions or concerns feel free to contact me for clarification

## 2022-08-23 ENCOUNTER — OFFICE VISIT (OUTPATIENT)
Dept: PALLATIVE CARE | Age: 87
End: 2022-08-23
Payer: MEDICARE

## 2022-08-23 VITALS
RESPIRATION RATE: 18 BRPM | HEART RATE: 65 BPM | SYSTOLIC BLOOD PRESSURE: 100 MMHG | DIASTOLIC BLOOD PRESSURE: 74 MMHG | OXYGEN SATURATION: 96 %

## 2022-08-23 DIAGNOSIS — G30.1 LATE ONSET ALZHEIMER'S DISEASE WITH BEHAVIORAL DISTURBANCE (HCC): Primary | ICD-10-CM

## 2022-08-23 DIAGNOSIS — Z51.5 PALLIATIVE CARE ENCOUNTER: ICD-10-CM

## 2022-08-23 DIAGNOSIS — M19.90 ARTHRITIS: ICD-10-CM

## 2022-08-23 DIAGNOSIS — F02.818 LATE ONSET ALZHEIMER'S DISEASE WITH BEHAVIORAL DISTURBANCE (HCC): Primary | ICD-10-CM

## 2022-08-23 DIAGNOSIS — M15.9 PRIMARY OSTEOARTHRITIS INVOLVING MULTIPLE JOINTS: ICD-10-CM

## 2022-08-23 DIAGNOSIS — M25.50 ARTHRALGIA, UNSPECIFIED JOINT: ICD-10-CM

## 2022-08-23 PROCEDURE — 1123F ACP DISCUSS/DSCN MKR DOCD: CPT | Performed by: NURSE PRACTITIONER

## 2022-08-23 NOTE — PROGRESS NOTES
[employer] of [address] [facility], [address] [facility]    Subjective:      Patient Id:  William Alonzo is a 80 y.o. female who presents today with     Chief Complaint   Patient presents with    Follow-up       HPI     Seen at [facility] DR. BECCA SANTIAGO for symptom management follow up rt behavorial and psychological symptoms of Late onset Alzheimer's disease,OA, HTN, HLD, Vertigo, A fib, depression, and heart valve disease. I find her sleeping in chair, she wakes to touch as she is [place]. She does not remember me, but answers questions appropriately. Appetite improved, feeds herself. Weight increasing. She is alert and she can make her needs known,    Continues Tylenol 500 mg po Tid,Voltaren gel qid, Tramadol 50 mg po every 12 hours prn moderate to severe pain. able to move both arms today without difficulty, though she cannot raise them above shoulder level without discomfort. She tells me her shoulder is very uncomfortable today. Has not used tramadol in a week. I remind staff she can have tramadol for moderate to severe pain such as today. She is wheelchair bound, 1 person assist with transfers. Complains of hallucinations but staff feel they are improved with Seroquel increased to 12.5 mg BID. She is able to be redirected and calmed down when disoriented. No GI or  concerns. No nik blood in stool or urine. SOB and edema at baseline. Negative excessive fatigue, fever, chills, myalgias, increased sputum production or cough, nausea, vomiting, chest pain, or orthopnea. Negative significant Sleep disturbance, depression, anxiety, or agitation episodes.         Past Medical History:   Diagnosis Date    Benign essential HTN 11/17/2019    Constipation 7/26/2016    Detrusor instability 11/17/2019    Dyslipidemia 11/17/2019    Heart valve disease 11/17/2019    Longstanding persistent atrial fibrillation ([facility] [address] 11/17/2019    Primary osteoarthritis involving multiple joints 11/17/2019 No past surgical history on file. Social History     Socioeconomic History    Marital status:      Spouse name: Not on file    Number of children: Not on file    Years of education: Not on file    Highest education level: Not on file   Occupational History    Not on file   Tobacco Use    Smoking status: Never Smoker    Smokeless tobacco: Never Used   Substance and Sexual Activity    Alcohol use: Not on file    Drug use: Not on file    Sexual activity: Not on file   Other Topics Concern    Not on file   Social History Narrative    Not on file     Social Determinants of Health     Financial Resource Strain:     Difficulty of Paying Living Expenses:    Food Insecurity:     Worried About [address] in the Last Year:     Ran Out of Food in the Last Year:    Transportation Needs:     Lack of Transportation (Medical):     Lack of Transportation (Non-Medical):    Physical Activity:     Days of Exercise per Week:     Minutes of Exercise per Session:    Stress:     Feeling of Stress :    Social Connections:     Frequency of Communication with Friends and Family:     Frequency of Social Gatherings with Friends and Family:     Attends Shinto Services: Active Member of Clubs or Organizations:     Attends Club or Organization Meetings:     Marital Status:    Intimate Partner Violence:     Fear of Current or Ex-Partner:     Emotionally Abused:     Physically Abused:     Sexually Abused:      No family history on file.   Allergies   Allergen Reactions    PCN [Penicillins]     Vesicare [Solifenacin]      Current Outpatient Medications on File Prior to Visit   Medication Sig Dispense Refill    Quetiapine (SEROQUEL) 25 MG tablet Take 12.5 mg by mouth 2 times daily      guaifenesin (ROBITUSSIN) 100 MG/5ML syrup Take 200 mg by mouth 4 times daily as needed for Cough      aspirin 325 MG tablet Take 325 mg by mouth daily      propranolol (INDERAL) 20 MG tablet Take 20 mg by mouth nightly      donepezil (ARICEPT) 5 MG tablet Take 5 mg by mouth nightly      diclofenac sodium (VOLTAREN) 1 % GEL Apply 2 g topically 4 times daily To affected shoulders 240 g 5    acetaminophen (TYLENOL) 500 MG tablet Take 500 mg by mouth 2 times daily      diltiazem (DILACOR XR) 240 MG extended release capsule Take 240 mg by mouth daily      docusate sodium (COLACE) 100 MG capsule Take 100 mg by mouth 2 times daily      hydrochlorothiazide (HYDRODIURIL) 12.5 MG tablet Take 12.5 mg by mouth daily      meclizine (ANTIVERT) 25 MG tablet Take 25 mg by mouth 2 times daily       potassium chloride (KLOR-CON M) 10 MEQ extended release tablet Take 10 mEq by mouth daily      Cholecalciferol (VITAMIN D) 2000 units CAPS capsule Take 2,000 Units by mouth daily       No current facility-administered medications on file prior to visit. Review of Systems   Constitutional: Positive for fatigue. Negative for activity change, appetite change, chills, diaphoresis, fever and unexpected weight change. HENT: Negative for drooling, hearing loss, mouth sores, sore throat, trouble swallowing and voice change. Eyes: Negative for discharge and visual disturbance. Respiratory: Negative for apnea, cough, choking, chest tightness, shortness of breath, wheezing and stridor. Cardiovascular: Negative for chest pain, palpitations and leg swelling. Gastrointestinal: Negative for abdominal distention, abdominal pain, anal bleeding, blood in stool, constipation, diarrhea, nausea, rectal pain and vomiting. Genitourinary: Negative for difficulty urinating, dysuria, enuresis, frequency and hematuria. Musculoskeletal: Positive for arthralgias. Negative for back pain, gait problem, joint swelling and myalgias. Skin: Negative for color change, pallor, rash and wound. Allergic/Immunologic: Negative for food allergies and immunocompromised state.    Neurological: Negative for dizziness, tremors, seizures, syncope, facial asymmetry, speech difficulty, weakness, light-headedness, numbness and headaches. Hematological: Negative for adenopathy. Does not bruise/bleed easily. Psychiatric/Behavioral: Positive for hallucinations. Negative for agitation, behavioral problems, confusion, decreased concentration, dysphoric mood, self-injury, sleep disturbance and suicidal ideas. The patient is nervous/anxious. The patient is not hyperactive. Objective: There were no vitals taken for this visit. Physical Exam  Constitutional:       General: She is not in acute distress. Appearance: She is well-developed. She is not diaphoretic. HENT:      Head: Normocephalic and atraumatic. Right Ear: External ear normal.      Left Ear: External ear normal.      Nose: Nose normal.      Mouth/Throat:      Pharynx: No oropharyngeal exudate. Eyes:      General: No scleral icterus. Right eye: No discharge. Left eye: No discharge. Conjunctiva/sclera: Conjunctivae normal.      Pupils: Pupils are equal, round, and reactive to light. Neck:      Thyroid: No thyromegaly. Vascular: No JVD. Trachea: No tracheal deviation. Cardiovascular:      Rate and Rhythm: Normal rate and regular rhythm. Heart sounds: Murmur heard. Pulmonary:      Effort: Pulmonary effort is normal. No respiratory distress. Breath sounds: Normal breath sounds. No stridor. No wheezing or rales. Chest:      Chest wall: No tenderness. Abdominal:      General: Bowel sounds are normal. There is no distension. Palpations: Abdomen is soft. There is no mass. Tenderness: There is no abdominal tenderness. There is no guarding or rebound. Musculoskeletal:         General: No tenderness or deformity. Normal range of motion. Cervical back: Normal range of motion and neck supple. Lymphadenopathy:      Cervical: No cervical adenopathy. Skin:     General: Skin is warm and dry. Capillary Refill: Capillary refill takes less than 2 seconds.       Findings: No erythema or rash. Neurological:      Mental Status: She is alert. Mental status is at baseline. Psychiatric:         Attention and Perception: Attention and perception normal.         Speech: Speech normal.         Behavior: Behavior normal.         Thought Content: Thought content normal.         Assessment:       Diagnosis Orders   1. Late onset Alzheimer's disease with behavioral disturbance ([facility] [address]     2. Behavioral and psychological symptoms of dementia ([facility] [address]     3. Primary osteoarthritis involving multiple joints     4. Palliative care encounter            Plan:    -Dementia with behavioral disturbance  Monitor for signs and symptoms of delirium as due to advanced age, history of cognitive difficulty, acute illness, and multiple co morbidities patient is high risk. Offer continuous behavior redirection and orientation, avoid delirium inducing medication, expose to natural sunlight as much as possible, calm surroundings as much as possible. Continue Seroquel 12.5 mg BID, Aricept, and lexapro. Multi-joint pain related to immobility and OA  - Continue Tramadol 50 mg po every 12 hours prn moderate to severe pain  - Continue Acetaminophen and diclofenac gel  - Heat or ice to painful areas, whichever is preferred  - Gentle ROM exercises    Palliative Care  Estimated Fast 6E  PPS 50%        No follow-ups on file. Mariusz Bowser CNP    Total Time 35 mins      Total time includes reviewing labs, reviewing history, medications, and allergies, counseling patient, performing medically appropriate evaluation and examination, discussing care with healthcare providers,family, and documenting in the medical record.                                                           meclizine (ANTIVERT) 25 MG tablet Take 25 mg by mouth 2 times daily       potassium chloride (KLOR-CON M) 10 MEQ extended release tablet Take 10 mEq by mouth daily      Cholecalciferol (VITAMIN D) 2000 units CAPS capsule Take 2,000 Units by mouth daily       No current facility-administered medications on file prior to visit. Review of Systems   Constitutional: Positive for fatigue. Negative for activity change, appetite change, chills, diaphoresis, fever and unexpected weight change. HENT: Negative for drooling, hearing loss, mouth sores, sore throat, trouble swallowing and voice change. Eyes: Negative for discharge and visual disturbance. Respiratory: Negative for apnea, cough, choking, chest tightness, shortness of breath, wheezing and stridor. Cardiovascular: Negative for chest pain, palpitations and leg swelling. Gastrointestinal: Negative for abdominal distention, abdominal pain, anal bleeding, blood in stool, constipation, diarrhea, nausea, rectal pain and vomiting. Genitourinary: Negative for difficulty urinating, dysuria, enuresis, frequency and hematuria. Musculoskeletal: Positive for arthralgias. Negative for back pain, gait problem, joint swelling and myalgias. Skin: Negative for color change, pallor, rash and wound. Allergic/Immunologic: Negative for food allergies and immunocompromised state. Neurological: Negative for dizziness, tremors, seizures, syncope, facial asymmetry, speech difficulty, weakness, light-headedness, numbness and headaches. Hematological: Negative for adenopathy. Does not bruise/bleed easily. Psychiatric/Behavioral: Positive for hallucinations. Negative for agitation, behavioral problems, confusion, decreased concentration, dysphoric mood, self-injury, sleep disturbance and suicidal ideas. The patient is nervous/anxious. The patient is not hyperactive. Objective: There were no vitals taken for this visit. Physical Exam  Constitutional:       General: She is not in acute distress. Appearance: She is well-developed. She is not diaphoretic. HENT:      Head: Normocephalic and atraumatic.       Right Ear: External ear normal.      Left Ear: External ear normal. Nose: Nose normal.      Mouth/Throat:      Pharynx: No oropharyngeal exudate. Eyes:      General: No scleral icterus. Right eye: No discharge. Left eye: No discharge. Conjunctiva/sclera: Conjunctivae normal.      Pupils: Pupils are equal, round, and reactive to light. Neck:      Thyroid: No thyromegaly. Vascular: No JVD. Trachea: No tracheal deviation. Cardiovascular:      Rate and Rhythm: Normal rate and regular rhythm. Heart sounds: Murmur heard. Pulmonary:      Effort: Pulmonary effort is normal. No respiratory distress. Breath sounds: Normal breath sounds. No stridor. No wheezing or rales. Chest:      Chest wall: No tenderness. Abdominal:      General: Bowel sounds are normal. There is no distension. Palpations: Abdomen is soft. There is no mass. Tenderness: There is no abdominal tenderness. There is no guarding or rebound. Musculoskeletal:         General: No tenderness or deformity. Normal range of motion. Cervical back: Normal range of motion and neck supple. Lymphadenopathy:      Cervical: No cervical adenopathy. Skin:     General: Skin is warm and dry. Capillary Refill: Capillary refill takes less than 2 seconds. Findings: No erythema or rash. Neurological:      Mental Status: She is alert. Mental status is at baseline. Psychiatric:         Attention and Perception: Attention and perception normal.         Speech: Speech normal.         Behavior: Behavior normal.         Thought Content: Thought content normal.         Assessment:       Diagnosis Orders   1. Late onset Alzheimer's disease with behavioral disturbance ([facility] [address]     2. Behavioral and psychological symptoms of dementia ([facility] [address]     3. Primary osteoarthritis involving multiple joints     4.  Palliative care encounter            Plan:    -Dementia with behavioral disturbance  Monitor for signs and symptoms of delirium as due to advanced age, history of cognitive difficulty, acute illness, and multiple co morbidities patient is high risk. Offer continuous behavior redirection and orientation, avoid delirium inducing medication, expose to natural sunlight as much as possible, calm surroundings as much as possible. Continue Seroquel 12.5 mg BID, Aricept, and lexapro. Multi-joint pain related to immobility and OA  - Continue Tramadol 50 mg po every 12 hours prn moderate to severe pain  - Continue Acetaminophen and diclofenac gel  - Heat or ice to painful areas, whichever is preferred  - Gentle ROM exercises    Palliative Care  Estimated Fast 6E  PPS 50%        No follow-ups on file. Mirian Whitman CNP                              [facility] of [address] [id number] [facility], [address]    Subjective:      Patient Id:  Deb Schafer is a 80 y.o. female who presents today with   Chief Complaint   Patient presents with    Follow-up       HPI     Seen at [facility] DR[facility] BECCA SANTIAGO for symptom management follow up rt behavorial and psychological symptoms of Late onset Alzheimer's disease,OA, HTN, HLD, Vertigo, A fib, depression, and heart valve disease. Much improved since my last visit, up in chair in dining room. Appetite is much improved, feeds herself. Weight increasing. Continues Tylenol 500 mg po bid, one tramadol at night, and Voltaren gel, able to move both arms today without difficulty, though she cannot raise them above shoulder level without discomfort. I remind staff she can have a tramadol in am if pain is severe during the day. Complains of hallucinations but staff feel they are improved with Seroquel increased to 12.5 mg BID. She is able to be redirected and calmed down when disoriented. No GI or  concerns. No nik blood in stool or urine. SOB and edema at baseline. Negative excessive fatigue, fever, chills, myalgias, increased sputum production or cough, nausea, vomiting, chest pain, or orthopnea.     Negative significant Sleep disturbance, depression, anxiety, or agitation episodes. She is looking forward to getting her nails done today. Past Medical History:   Diagnosis Date    Benign essential HTN 11/17/2019    Constipation 7/26/2016    Detrusor instability 11/17/2019    Dyslipidemia 11/17/2019    Heart valve disease 11/17/2019    Longstanding persistent atrial fibrillation ([facility] [address] 11/17/2019    Primary osteoarthritis involving multiple joints 11/17/2019     No past surgical history on file. Social History     Socioeconomic History    Marital status:      Spouse name: Not on file    Number of children: Not on file    Years of education: Not on file    Highest education level: Not on file   Occupational History    Not on file   Tobacco Use    Smoking status: Never Smoker    Smokeless tobacco: Never Used   Substance and Sexual Activity    Alcohol use: Not on file    Drug use: Not on file    Sexual activity: Not on file   Other Topics Concern    Not on file   Social History Narrative    Not on file     Social Determinants of Health     Financial Resource Strain:     Difficulty of Paying Living Expenses:    Food Insecurity:     Worried About [address] in the Last Year:     Ran Out of Food in the Last Year:    Transportation Needs:     Lack of Transportation (Medical):     Lack of Transportation (Non-Medical):    Physical Activity:     Days of Exercise per Week:     Minutes of Exercise per Session:    Stress:     Feeling of Stress :    Social Connections:     Frequency of Communication with Friends and Family:     Frequency of Social Gatherings with Friends and Family:     Attends [employer] Services: Active Member of Clubs or Organizations:     Attends Club or Organization Meetings:     Marital Status:    Intimate Partner Violence:     Fear of Current or Ex-Partner:     Emotionally Abused:     Physically Abused:     Sexually Abused:      No family history on file.   Allergies   Allergen Reactions    PCN [Penicillins]     Vesicare [Solifenacin]      Current Outpatient Medications on File Prior to Visit   Medication Sig Dispense Refill    Quetiapine (SEROQUEL) 25 MG tablet Take 12.5 mg by mouth 2 times daily      guaifenesin (ROBITUSSIN) 100 MG/5ML syrup Take 200 mg by mouth 4 times daily as needed for Cough      aspirin 325 MG tablet Take 325 mg by mouth daily      propranolol (INDERAL) 20 MG tablet Take 20 mg by mouth nightly      donepezil (ARICEPT) 5 MG tablet Take 5 mg by mouth nightly      diclofenac sodium (VOLTAREN) 1 % GEL Apply 2 g topically 4 times daily To affected shoulders 240 g 5    acetaminophen (TYLENOL) 500 MG tablet Take 500 mg by mouth 2 times daily      diltiazem (DILACOR XR) 240 MG extended release capsule Take 240 mg by mouth daily      docusate sodium (COLACE) 100 MG capsule Take 100 mg by mouth 2 times daily      hydrochlorothiazide (HYDRODIURIL) 12.5 MG tablet Take 12.5 mg by mouth daily      meclizine (ANTIVERT) 25 MG tablet Take 25 mg by mouth 2 times daily       potassium chloride (KLOR-CON M) 10 MEQ extended release tablet Take 10 mEq by mouth daily      Cholecalciferol (VITAMIN D) 2000 units CAPS capsule Take 2,000 Units by mouth daily       No current facility-administered medications on file prior to visit. Review of Systems   Constitutional: Positive for fatigue. Negative for activity change, appetite change, chills, diaphoresis, fever and unexpected weight change. HENT: Negative fordrooling, hearing loss, mouth sores, sore throat, trouble swallowing and voice change. Eyes: Negative for discharge and visual disturbance. Respiratory: Negative for apnea, cough, choking, chest tightness, shortness of breath, wheezing and stridor. Cardiovascular: Negative for chest pain, palpitations and leg swelling. Gastrointestinal: Negative for abdominal distention, abdominal pain, anal bleeding, blood in stool, constipation, diarrhea, nausea, rectal pain and vomiting. Genitourinary: Negative for difficulty urinating, dysuria, enuresis, frequency and hematuria. Musculoskeletal: Positive for arthralgias. Negative for back pain, gait problem, joint swelling and myalgias. Skin: Negative for color change, pallor, rash and wound. Allergic/Immunologic: Negative for food allergies and immunocompromised state. Neurological: Negative for dizziness, tremors, seizures, syncope, facial asymmetry, speech difficulty, weakness, light-headedness, numbness and headaches. Hematological: Negative for adenopathy. Does not bruise/bleed easily. Psychiatric/Behavioral: Positive for hallucinations. Negative for agitation, behavioral problems, confusion, decreased concentration, dysphoric mood, self-injury, sleep disturbance and suicidal ideas. The patient is nervous/anxious. The patient is not hyperactive. Objective: There were no vitals taken for this visit. Physical Exam  Constitutional:       General: She is not in acute distress. Appearance: She is well-developed. She is not diaphoretic. HENT:      Head: Normocephalic and atraumatic. Right Ear: External ear normal.      Left Ear: External ear normal.      Nose: Nose normal.      Mouth/Throat:      Pharynx: No oropharyngeal exudate. Eyes:      General: No scleral icterus. Right eye: No discharge. Left eye: No discharge. Conjunctiva/sclera: Conjunctivae normal.      Pupils: Pupils are equal, round, and reactive to light. Neck:      Thyroid: No thyromegaly. Vascular: No JVD. Trachea: No tracheal deviation. Cardiovascular:      Rate and Rhythm: Normal rate and regular rhythm. Heart sounds: Murmur heard. Pulmonary:      Effort: Pulmonary effort is normal. No respiratory distress. Breath sounds: Normal breath sounds. No stridor. No wheezing or rales. Chest:      Chest wall: No tenderness. Abdominal:      General: Bowel sounds are normal. There is no distension. Palpations: Abdomen is soft. There is no mass. Tenderness: There is no abdominal tenderness. There is no guarding or rebound. Musculoskeletal:         General: No tenderness or deformity. Normal range of motion. Cervical back: Normal range of motion and neck supple. Lymphadenopathy:      Cervical: No cervical adenopathy. Skin:     General: Skin is warm and dry. Capillary Refill: Capillary refill takes less than 2 seconds. Findings: No erythema or rash. Neurological:      Mental Status: She is alert. Mental status is at baseline. Psychiatric:         Attention and Perception: Attention and perception normal.         Speech: Speech normal.         Behavior: Behavior normal.         Thought Content: Thought content normal.         Assessment:       Diagnosis Orders   1. Late onset Alzheimer's disease with behavioral disturbance ([facility] [address]     2. Behavioral and psychological symptoms of dementia ([facility] [address]     3. Primary osteoarthritis involving multiple joints     4. Palliative care encounter            Plan:    -Dementia with behavioral disturbance  Monitor for signs and symptoms of delirium as due to advanced age, history of cognitive difficulty, acute illness, and multiple co morbidities patient is high risk. Offer continuous behavior redirection and orientation, avoid delirium inducing medication, expose to natural sunlight as much as possible, calm surroundings as much as possible. Continue Seroquel 12.5 mg BID, Aricept, and lexapro. Multi-joint pain related to immobility and OA  - Continue Tramadol 50 mg po every 12 hours prn moderate to severe pain  - Continue Acetaminophen and diclofenac gel  - Heat or ice to painful areas, whichever is preferred  - Gentle ROM exercises    Palliative Care  Estimated Fast 6E  PPS 40%  Post Covid Syndrome        No follow-ups on file. Mirian Whitman CNP                      Assessment:       Diagnosis Orders   1.  Late onset Alzheimer's disease with behavioral disturbance ([facility] [address]        2. Arthritis        3. Primary osteoarthritis involving multiple joints        4. Palliative care encounter        5. Arthralgia, unspecified joint                 Plan:      No orders of the defined types were placed in this encounter. No orders of the defined types were placed in this encounter. No follow-ups on file. JESSENIA Olmedo - YANET                [facility] of [address], [address]    Subjective:      Patient Id:  Ivet Hensley is a 80 y.o. female who presents today with   Chief Complaint   Patient presents with    Follow-up       HPI     Seen at [facility] DR. BECCA SANTIAGO for symptom management follow up rt behavorial and psychological symptoms of Late onset Alzheimer's disease,OA, HTN, HLD, Vertigo, A fib, depression, and heart valve disease. Much improved since my last visit, up in chair in dining room. Appetite is much improved, feeds herself. Weight increasing. Continues Tylenol 500 mg po bid, one tramadol at night, and Voltaren gel, able to move both arms today without difficulty, though she cannot raise them above shoulder level without discomfort. I remind staff she can have a tramadol in am if pain is severe during the day. Complains of hallucinations but staff feel they are improved with Seroquel increased to 12.5 mg BID. She is able to be redirected and calmed down when disoriented. No GI or  concerns. No nik blood in stool or urine. SOB and edema at baseline. Negative excessive fatigue, fever, chills, myalgias, increased sputum production or cough, nausea, vomiting, chest pain, or orthopnea. Negative significant Sleep disturbance, depression, anxiety, or agitation episodes. She is looking forward to getting her nails done today.     Past Medical History:   Diagnosis Date    Benign essential HTN 11/17/2019    Constipation 7/26/2016    Detrusor instability 11/17/2019 Dyslipidemia 11/17/2019    Heart valve disease 11/17/2019    Longstanding persistent atrial fibrillation ([address] 11/17/2019    Primary osteoarthritis involving multiple joints 11/17/2019     No past surgical history on file. Social History     Socioeconomic History    Marital status:      Spouse name: Not on file    Number of children: Not on file    Years of education: Not on file    Highest education level: Not on file   Occupational History    Not on file   Tobacco Use    Smoking status: Never Smoker    Smokeless tobacco: Never Used   Substance and Sexual Activity    Alcohol use: Not on file    Drug use: Not on file    Sexual activity: Not on file   Other Topics Concern    Not on file   Social History Narrative    Not on file     Social Determinants of Health     Financial Resource Strain:     Difficulty of Paying Living Expenses:    Food Insecurity:     Worried About [address] [employer] in the Last Year:     Ran Out of Food in the Last Year:    Transportation Needs:     Lack of Transportation (Medical):     Lack of Transportation (Non-Medical):    Physical Activity:     Days of Exercise per Week:     Minutes of Exercise per Session:    Stress:     Feeling of Stress :    Social Connections:     Frequency of Communication with Friends and Family:     Frequency of Social Gatherings with Friends and Family:     Attends Oriental orthodox Services: Active Member of Clubs or Organizations:     Attends Club or Organization Meetings:     Marital Status:    Intimate Partner Violence:     Fear of Current or Ex-Partner:     Emotionally Abused:     Physically Abused:     Sexually Abused:      No family history on file.   Allergies   Allergen Reactions    PCN [Penicillins]     Vesicare [Solifenacin]      Current Outpatient Medications on File Prior to Visit   Medication Sig Dispense Refill    Quetiapine (SEROQUEL) 25 MG tablet Take 12.5 mg by mouth 2 times daily      guaifenesin (ROBITUSSIN) 100 MG/5ML syrup Take 200 mg by mouth 4 times daily as needed for Cough      aspirin 325 MG tablet Take 325 mg by mouth daily      propranolol (INDERAL) 20 MG tablet Take 20 mg by mouth nightly      donepezil (ARICEPT) 5 MG tablet Take 5 mg by mouth nightly      diclofenac sodium (VOLTAREN) 1 % GEL Apply 2 g topically 4 times daily To affected shoulders 240 g 5    acetaminophen (TYLENOL) 500 MG tablet Take 500 mg by mouth 2 times daily      diltiazem (DILACOR XR) 240 MG extended release capsule Take 240 mg by mouth daily      docusate sodium (COLACE) 100 MG capsule Take 100 mg by mouth 2 times daily      hydrochlorothiazide (HYDRODIURIL) 12.5 MG tablet Take 12.5 mg by mouth daily      meclizine (ANTIVERT) 25 MG tablet Take 25 mg by mouth 2 times daily       potassium chloride (KLOR-CON M) 10 MEQ extended release tablet Take 10 mEq by mouth daily      Cholecalciferol (VITAMIN D) 2000 units CAPS capsule Take 2,000 Units by mouth daily       No current facility-administered medications on file prior to visit. Review of Systems   Constitutional: Positive for fatigue. Negative for activity change, appetite change, chills, diaphoresis, fever and unexpected weight change. HENT: Negative for drooling, hearing loss, mouth sores, sore throat, trouble swallowing and voice change. Eyes: Negative for discharge and visual disturbance. Respiratory: Negative for apnea, cough, choking, chest tightness, shortness of breath, wheezing and stridor. Cardiovascular: Negative for chest pain, palpitations and leg swelling. Gastrointestinal: Negative for abdominal distention, abdominal pain, anal bleeding, blood in stool, constipation, diarrhea, nausea, rectal pain and vomiting. Genitourinary: Negative for difficulty urinating, dysuria, enuresis, frequency and hematuria. Musculoskeletal: Positive for arthralgias. Negative for back pain, gait problem, joint swelling and myalgias. Skin: Negative for color change, pallor, rash and wound. Allergic/Immunologic: Negative for food allergies and immunocompromised state. Neurological: Negative for dizziness, tremors, seizures, syncope, facial asymmetry, speech difficulty, weakness, light-headedness, numbness and headaches. Hematological: Negative for adenopathy. Does not bruise/bleed easily. Psychiatric/Behavioral: Positive for hallucinations. Negative for agitation, behavioral problems, confusion, decreased concentration, dysphoric mood, self-injury, sleep disturbance and suicidal ideas. The patient is nervous/anxious. The patient is not hyperactive. Objective: There were no vitals taken for this visit. Physical Exam  Constitutional:       General: She is not in acute distress. Appearance: She is well-developed. She is not diaphoretic. HENT:      Head: Normocephalic and atraumatic. Right Ear: External ear normal.      Left Ear: External ear normal.      Nose: Nose normal.      Mouth/Throat:      Pharynx: No oropharyngeal exudate. Eyes:      General: No scleral icterus. Right eye: No discharge. Left eye: No discharge. Conjunctiva/sclera: Conjunctivae normal.      Pupils: Pupils are equal, round, and reactive to light. Neck:      Thyroid: No thyromegaly. Vascular: No JVD. Trachea: No tracheal deviation. Cardiovascular:      Rate and Rhythm: Normal rate and regular rhythm. Heart sounds: Murmur heard. Pulmonary:      Effort: Pulmonary effort is normal. No respiratory distress. Breath sounds: Normal breath sounds. No stridor. No wheezing or rales. Chest:      Chest wall: No tenderness. Abdominal:      General: Bowel sounds are normal. There is no distension. Palpations: Abdomen is soft. There is no mass. Tenderness: There is no abdominal tenderness. There is no guarding or rebound. Musculoskeletal:         General: No tenderness or deformity. Normal range of motion.       Cervical back: Normal range of motion and neck supple. Lymphadenopathy:      Cervical: No cervical adenopathy. Skin:     General: Skin is warm and dry. Capillary Refill: Capillary refill takes less than 2 seconds. Findings: No erythema or rash. Neurological:      Mental Status: She is alert. Mental status is at baseline. Psychiatric:         Attention and Perception: Attention and perception normal.         Speech: Speech normal.         Behavior: Behavior normal.         Thought Content: Thought content normal.         Assessment:       Diagnosis Orders   1. Late onset Alzheimer's disease with behavioral disturbance ([facility] [address]     2. Behavioral and psychological symptoms of dementia ([facility] [address]     3. Primary osteoarthritis involving multiple joints     4. Palliative care encounter            Plan:    -Dementia with behavioral disturbance  Monitor for signs and symptoms of delirium as due to advanced age, history of cognitive difficulty, acute illness, and multiple co morbidities patient is high risk. Offer continuous behavior redirection and orientation, avoid delirium inducing medication, expose to natural sunlight as much as possible, calm surroundings as much as possible. Continue Seroquel 12.5 mg BID, Aricept, and lexapro. Multi-joint pain related to immobility and OA  - Continue Tramadol 50 mg po every 12 hours prn moderate to severe pain  - Continue Acetaminophen and diclofenac gel  - Heat or ice to painful areas, whichever is preferred  - Gentle ROM exercises    Palliative Care  Estimated Fast 6E  PPS 40%  Post Covid Syndrome        No follow-ups on file. Shinglehouse Bloodgood CNP                      Assessment:       Diagnosis Orders   1. Palliative care encounter     2. Pain of both shoulder joints     3. Osteoarthritis of multiple joints, unspecified osteoarthritis type     4.  Late [facility]            Plan:      No orders of the defined types were placed in this encounter. No orders of the defined types were placed in this encounter. No follow-ups on file. Mirian Whitman, APRN - CNP                [facility] of [address] [id number] [facility], [address] [facility]    Subjective:      Patient Id:  Deb Schafer is a 80 y.o. female who presents today with     Chief Complaint   Patient presents with    Follow-up       HPI     Seen at [facility]OSP DR. BECCA SANTIAGO for symptom management follow up rt behavorial and psychological symptoms of Late onset Alzheimer's disease,OA, HTN, HLD, Vertigo, A fib, depression, and heart valve disease. In wheelchair in room after lunch, I assisted her to a regular chair. She transfers herself with me acting as a guide. Appetite improved, feeds herself. Weight increasing. She is alert, answering questions appropriately, she can make her needs known,    Continues Tylenol 500 mg po Tid,Voltaren gel qid, Tramadol 50 mg po every 12 hours prn moderate to severe pain. able to move both arms today without difficulty, though she cannot raise them above shoulder level without discomfort. She tells me her shoulder is very uncomfortable today. Has not used tramadol in a week. I remind staff she can have tramadol for moderate to sever pain such as today. Complains of hallucinations but staff feel they are improved with Seroquel increased to 12.5 mg BID. She is able to be redirected and calmed down when disoriented. No GI or  concerns. No nik blood in stool or urine. SOB and edema at baseline. Negative excessive fatigue, fever, chills, myalgias, increased sputum production or cough, nausea, vomiting, chest pain, or orthopnea. Negative significant Sleep disturbance, depression, anxiety, or agitation episodes.         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 Social History     Socioeconomic History    Marital status:      Spouse name: Not on file    Number of children: Not on file    Years of education: Not on file    Highest education level: Not on file   Occupational History    Not on file   Tobacco Use    Smoking status: Never Smoker    Smokeless tobacco: Never Used   Substance and Sexual Activity    Alcohol use: Not on file    Drug use: Not on file    Sexual activity: Not on file   Other Topics Concern    Not on file   Social History Narrative    Not on file     Social Determinants of Health     Financial Resource Strain:     Difficulty of Paying Living Expenses:    Food Insecurity:     Worried About [address] [employer] in the Last Year:     Ran Out of Food in the Last Year:    Transportation Needs:     Lack of Transportation (Medical):     Lack of Transportation (Non-Medical):    Physical Activity:     Days of Exercise per Week:     Minutes of Exercise per Session:    Stress:     Feeling of Stress :    Social Connections:     Frequency of Communication with Friends and Family:     Frequency of Social Gatherings with Friends and Family:     Attends Sabianist Services: Active Member of Clubs or Organizations:     Attends Club or Organization Meetings:     Marital Status:    Intimate Partner Violence:     Fear of Current or Ex-Partner:     Emotionally Abused:     Physically Abused:     Sexually Abused:      No family history on file.   Allergies   Allergen Reactions    PCN [Penicillins]     Vesicare [Solifenacin]      Current Outpatient Medications on File Prior to Visit   Medication Sig Dispense Refill    Quetiapine (SEROQUEL) 25 MG tablet Take 12.5 mg by mouth 2 times daily      guaifenesin (ROBITUSSIN) 100 MG/5ML syrup Take 200 mg by mouth 4 times daily as needed for Cough      aspirin 325 MG tablet Take 325 mg by mouth daily      propranolol (INDERAL) 20 MG tablet Take 20 mg by mouth nightly      donepezil (ARICEPT) 5 MG tablet Take 5 mg by mouth nightly      diclofenac sodium (VOLTAREN) 1 % GEL Apply 2 g topically 4 times daily To affected shoulders 240 g 5    acetaminophen (TYLENOL) 500 MG tablet Take 500 mg by mouth 2 times daily      diltiazem (DILACOR XR) 240 MG extended release capsule Take 240 mg by mouth daily      docusate sodium (COLACE) 100 MG capsule Take 100 mg by mouth 2 times daily      hydrochlorothiazide (HYDRODIURIL) 12.5 MG tablet Take 12.5 mg by mouth daily      meclizine (ANTIVERT) 25 MG tablet Take 25 mg by mouth 2 times daily       potassium chloride (KLOR-CON M) 10 MEQ extended release tablet Take 10 mEq by mouth daily      Cholecalciferol (VITAMIN D) 2000 units CAPS capsule Take 2,000 Units by mouth daily       No current facility-administered medications on file prior to visit. Review of Systems   Constitutional: Positive for fatigue. Negative for activity change, appetite change, chills, diaphoresis, fever and unexpected weight change. HENT: Negative for drooling, hearing loss, mouth sores, sore throat, trouble swallowing and voice change. Eyes: Negative for discharge and visual disturbance. Respiratory: Negative for apnea, cough, choking, chest tightness, shortness of breath, wheezing and stridor. Cardiovascular: Negative for chest pain, palpitations and leg swelling. Gastrointestinal: Negative for abdominal distention, abdominal pain, anal bleeding, blood in stool, constipation, diarrhea, nausea, rectal pain and vomiting. Genitourinary: Negative for difficulty urinating, dysuria, enuresis, frequency and hematuria. Musculoskeletal: Positive for arthralgias. Negative for back pain, gait problem, joint swelling and myalgias. Skin: Negative for color change, pallor, rash and wound.    Allergic/Immunologic: Negative for food allergies and immunocompromised state. Neurological: Negative for dizziness, tremors, seizures, syncope, facial asymmetry, speech difficulty, weakness, light-headedness, numbness and headaches. Hematological: Negative for adenopathy. Does not bruise/bleed easily. Psychiatric/Behavioral: Positive for hallucinations. Negative for agitation, behavioral problems, confusion, decreased concentration, dysphoric mood, self-injury, sleep disturbance and suicidal ideas. The patient is nervous/anxious. The patient is not hyperactive. Objective: There were no vitals taken for this visit. Physical Exam  Constitutional:       General: She is not in acute distress. Appearance: She is well-developed. She is not diaphoretic. HENT:      Head: Normocephalic and atraumatic. Right Ear: External ear normal.      Left Ear: External ear normal.      Nose: Nose normal.      Mouth/Throat:      Pharynx: No oropharyngeal exudate. Eyes:      General: No scleral icterus. Right eye: No discharge. Left eye: No discharge. Conjunctiva/sclera: Conjunctivae normal.      Pupils: Pupils are equal, round, and reactive to light. Neck:      Thyroid: No thyromegaly. Vascular: No JVD. Trachea: No tracheal deviation. Cardiovascular:      Rate and Rhythm: Normal rate and regular rhythm. Heart sounds: Murmur heard. Pulmonary:      Effort: Pulmonary effort is normal. No respiratory distress. Breath sounds: Normal breath sounds. No stridor. No wheezing or rales. Chest:      Chest wall: No tenderness. Abdominal:      General: Bowel sounds are normal. There is no distension. Palpations: Abdomen is soft. There is no mass. Tenderness: There is no abdominal tenderness. There is no guarding or rebound. Musculoskeletal:         General: No tenderness or deformity. Normal range of motion. Cervical back: Normal range of motion and neck supple.    Lymphadenopathy:      Cervical: No cervical adenopathy. Skin:     General: Skin is warm and dry. Capillary Refill: Capillary refill takes less than 2 seconds. Findings: No erythema or rash. Neurological:      Mental Status: She is alert. Mental status is at baseline. Psychiatric:         Attention and Perception: Attention and perception normal.         Speech: Speech normal.         Behavior: Behavior normal.         Thought Content: Thought content normal.         Assessment:       Diagnosis Orders   1. Late onset Alzheimer's disease with behavioral disturbance ([facility]     2. Behavioral and psychological symptoms of dementia ([facility]     3. Primary osteoarthritis involving multiple joints     4. Palliative care encounter            Plan:    -Dementia with behavioral disturbance  Monitor for signs and symptoms of delirium as due to advanced age, history of cognitive difficulty, acute illness, and multiple co morbidities patient is high risk. Offer continuous behavior redirection and orientation, avoid delirium inducing medication, expose to natural sunlight as much as possible, calm surroundings as much as possible. Continue Seroquel 12.5 mg BID, Aricept, and lexapro. Multi-joint pain related to immobility and OA  - Continue Tramadol 50 mg po every 12 hours prn moderate to severe pain  - Continue Acetaminophen and diclofenac gel  - Heat or ice to painful areas, whichever is preferred  - Gentle ROM exercises    Palliative Care  Estimated Fast 6E  PPS 50%        No follow-ups on file. Stephanie Cochran [address] [address] [address], [address] Located within [facility]    Subjective:      Patient Id:  Jorge Jacob is a 80 y.o. female who presents today with   Chief Complaint   Patient presents with    Follow-up       HPI     Seen at [facility]OSP DR[facility] BECCA SANTIAGO for symptom management follow up rt behavorial and psychological symptoms of Late onset Alzheimer's disease,OA, HTN, HLD, Vertigo, A fib, depression, and heart valve disease. Much improved since my last visit, up in chair in dining room. Appetite is much improved, feeds herself. Weight increasing. Continues Tylenol 500 mg po bid, one tramadol at night, and Voltaren gel, able to move both arms today without difficulty, though she cannot raise them above shoulder level without discomfort. I remind staff she can have a tramadol in am if pain is severe during the day. Complains of hallucinations but staff feel they are improved with Seroquel increased to 12.5 mg BID. She is able to be redirected and calmed down when disoriented. No GI or  concerns. No nik blood in stool or urine. SOB and edema at baseline. Negative excessive fatigue, fever, chills, myalgias, increased sputum production or cough, nausea, vomiting, chest pain, or orthopnea. Negative significant Sleep disturbance, depression, anxiety, or agitation episodes. She is looking forward to getting her nails done today. 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 Social History     Socioeconomic History    Marital status:       Spouse name: Not on file    Number of children: Not on file    Years of education: Not on file    Highest education level: Not on file   Occupational History    Not on file   Tobacco Use    Smoking status: Never Smoker    Smokeless tobacco: Never Used   Substance and Sexual Activity    Alcohol use: Not on file    Drug use: Not on file    Sexual activity: Not on file   Other Topics Concern    Not on file   Social History Narrative    Not on file     Social Determinants of Health     Financial Resource Strain: Difficulty of Paying Living Expenses:    Food Insecurity:     Worried About Running Out of Food in the Last Year:     Ran Out of Food in the Last Year:    Transportation Needs:     Lack of Transportation (Medical):     Lack of Transportation (Non-Medical):    Physical Activity:     Days of Exercise per Week:     Minutes of Exercise per Session:    Stress:     Feeling of Stress :    Social Connections:     Frequency of Communication with Friends and Family:     Frequency of Social Gatherings with Friends and Family:     Attends Alevism Services: Active Member of Clubs or Organizations:     Attends Club or Organization Meetings:     Marital Status:    Intimate Partner Violence:     Fear of Current or Ex-Partner:     Emotionally Abused:     Physically Abused:     Sexually Abused:      No family history on file.   Allergies   Allergen Reactions    PCN [Penicillins]     Vesicare [Solifenacin]      Current Outpatient Medications on File Prior to Visit   Medication Sig Dispense Refill    Quetiapine (SEROQUEL) 25 MG tablet Take 12.5 mg by mouth 2 times daily      guaifenesin (ROBITUSSIN) 100 MG/5ML syrup Take 200 mg by mouth 4 times daily as needed for Cough      aspirin 325 MG tablet Take 325 mg by mouth daily      propranolol (INDERAL) 20 MG tablet Take 20 mg by mouth nightly      donepezil (ARICEPT) 5 MG tablet Take 5 mg by mouth nightly      diclofenac sodium (VOLTAREN) 1 % GEL Apply 2 g topically 4 times daily To affected shoulders 240 g 5    acetaminophen (TYLENOL) 500 MG tablet Take 500 mg by mouth 2 times daily      diltiazem (DILACOR XR) 240 MG extended release capsule Take 240 mg by mouth daily      docusate sodium (COLACE) 100 MG capsule Take 100 mg by mouth 2 times daily      hydrochlorothiazide (HYDRODIURIL) 12.5 MG tablet Take 12.5 mg by mouth daily      meclizine (ANTIVERT) 25 MG tablet Take 25 mg by mouth 2 times daily       potassium chloride (KLOR-CON M) 10 MEQ extended release tablet Take 10 mEq by mouth daily      Cholecalciferol (VITAMIN D) 2000 units CAPS capsule Take 2,000 Units by mouth daily       No current facility-administered medications on file prior to visit. Review of Systems   Constitutional: Positive for fatigue. Negative for activity change, appetite change, chills, diaphoresis, fever and unexpected weight change. HENT: Negative for drooling, hearing loss, mouth sores, sore throat, trouble swallowing and voice change. Eyes: Negative for discharge and visual disturbance. Respiratory: Negative for apnea, cough, choking, chest tightness, shortness of breath, wheezing and stridor. Cardiovascular: Negative for chest pain, palpitations and leg swelling. Gastrointestinal: Negative for abdominal distention, abdominal pain, anal bleeding, blood in stool, constipation, diarrhea, nausea, rectal pain and vomiting. Genitourinary: Negative for difficulty urinating, dysuria, enuresis, frequency and hematuria. Musculoskeletal: Positive for arthralgias. Negative for back pain, gait problem, joint swelling and myalgias. Skin: Negative for color change, pallor, rash and wound. Allergic/Immunologic: Negative for food allergies and immunocompromised state. Neurological: Negative for dizziness, tremors, seizures, syncope, facial asymmetry, speech difficulty, weakness, light-headedness, numbness and headaches. Hematological: Negative for adenopathy. Does not bruise/bleed easily. Psychiatric/Behavioral: Positive for hallucinations. Negative for agitation, behavioral problems, confusion, decreased concentration, dysphoric mood, self-injury, sleep disturbance and suicidal ideas. The patient is nervous/anxious. The patient is not hyperactive. Objective: There were no vitals taken for this visit. Physical Exam  Constitutional:       General: She is not in acute distress. Appearance: She is well-developed. She is not diaphoretic.    HENT:      Head: Normocephalic and atraumatic. Right Ear: External ear normal.      Left Ear: External ear normal.      Nose: Nose normal.      Mouth/Throat:      Pharynx: No oropharyngeal exudate. Eyes:      General: No scleral icterus. Right eye: No discharge. Left eye: No discharge. Conjunctiva/sclera: Conjunctivae normal.      Pupils: Pupils are equal, round, and reactive to light. Neck:      Thyroid: No thyromegaly. Vascular: No JVD. Trachea: No tracheal deviation. Cardiovascular:      Rate and Rhythm: Normal rate and regular rhythm. Heart sounds: Murmur heard. Pulmonary:      Effort: Pulmonary effort is normal. No respiratory distress. Breath sounds: Normal breath sounds. No stridor. No wheezing or rales. Chest:      Chest wall: No tenderness. Abdominal:      General: Bowel sounds are normal. There is no distension. Palpations: Abdomen is soft. There is no mass. Tenderness: There is no abdominal tenderness. There is no guarding or rebound. Musculoskeletal:         General: No tenderness or deformity. Normal range of motion. Cervical back: Normal range of motion and neck supple. Lymphadenopathy:      Cervical: No cervical adenopathy. Skin:     General: Skin is warm and dry. Capillary Refill: Capillary refill takes less than 2 seconds. Findings: No erythema or rash. Neurological:      Mental Status: She is alert. Mental status is at baseline. Psychiatric:         Attention and Perception: Attention and perception normal.         Speech: Speech normal.         Behavior: Behavior normal.         Thought Content: Thought content normal.         Assessment:       Diagnosis Orders   1. Late onset Alzheimer's disease with behavioral disturbance ([address]     2. Behavioral and psychological symptoms of dementia ([address]     3. Primary osteoarthritis involving multiple joints     4.  Palliative care encounter            Plan:    -Dementia with behavioral disturbance  Monitor for signs and symptoms of delirium as due to advanced age, history of cognitive difficulty, acute illness, and multiple co morbidities patient is high risk. Offer continuous behavior redirection and orientation, avoid delirium inducing medication, expose to natural sunlight as much as possible, calm surroundings as much as possible. Continue Seroquel 12.5 mg BID, Aricept, and lexapro. Multi-joint pain related to immobility and OA  - Continue Tramadol 50 mg po every 12 hours prn moderate to severe pain  - Continue Acetaminophen and diclofenac gel  - Heat or ice to painful areas, whichever is preferred  - Gentle ROM exercises    Palliative Care  Estimated Fast 6E  PPS 40%  Post Covid Syndrome        No follow-ups on file. Khalida Ruano CNP                      Assessment:       Diagnosis Orders   1. Late onset Alzheimer's disease with behavioral disturbance ([facility]        2. Arthritis        3. Primary osteoarthritis involving multiple joints        4. Palliative care encounter        5. Arthralgia, unspecified joint                 Plan:      No orders of the defined types were placed in this encounter. No orders of the defined types were placed in this encounter. No follow-ups on file. JESSENIA Kirby - YANET                [place] [address] [address], [address] [facility]    Subjective:      Patient Id:  Anne Barclay is a 80 y.o. female who presents today with   Chief Complaint   Patient presents with    Follow-up       HPI     Seen at [facility] DR[facility] BECCA SANTIAGO for symptom management follow up rt behavorial and psychological symptoms of Late onset Alzheimer's disease,OA, HTN, HLD, Vertigo, A fib, depression, and heart valve disease. Much improved since my last visit, up in chair in dining room. Appetite is much improved, feeds herself. Weight increasing.     Continues Tylenol 500 mg po bid, one tramadol at night, and Voltaren gel, able to move both arms today without difficulty, though she cannot raise them above shoulder level without discomfort. I remind staff she can have a tramadol in am if pain is severe during the day. Complains of hallucinations but staff feel they are improved with Seroquel increased to 12.5 mg BID. She is able to be redirected and calmed down when disoriented. No GI or  concerns. No nik blood in stool or urine. SOB and edema at baseline. Negative excessive fatigue, fever, chills, myalgias, increased sputum production or cough, nausea, vomiting, chest pain, or orthopnea. Negative significant Sleep disturbance, depression, anxiety, or agitation episodes. She is looking forward to getting her nails done today. Past Medical History:   Diagnosis Date    Benign essential HTN 11/17/2019    Constipation 7/26/2016    Detrusor instability 11/17/2019    Dyslipidemia 11/17/2019    Heart valve disease 11/17/2019    Longstanding persistent atrial fibrillation ([facility] 11/17/2019    Primary osteoarthritis involving multiple joints 11/17/2019     No past surgical history on file. Social History     Socioeconomic History    Marital status:       Spouse name: Not on file    Number of children: Not on file    Years of education: Not on file    Highest education level: Not on file   Occupational History    Not on file   Tobacco Use    Smoking status: Never Smoker    Smokeless tobacco: Never Used   Substance and Sexual Activity    Alcohol use: Not on file    Drug use: Not on file    Sexual activity: Not on file   Other Topics Concern    Not on file   Social History Narrative    Not on file     Social Determinants of Health     Financial Resource Strain:     Difficulty of Paying Living Expenses:    Food Insecurity:     Worried About [facility] in the Last Year:     [address] in the Last Year:    Transportation Needs:     Lack of Transportation (Medical):     Lack of Transportation (Non-Medical): Physical Activity:     Days of Exercise per Week:     Minutes of Exercise per Session:    Stress:     Feeling of Stress :    Social Connections:     Frequency of Communication with Friends and Family:     Frequency of Social Gatherings with Friends and Family:     Attends Faith Services: Active Member of Clubs or Organizations:     Attends Club or Organization Meetings:     Marital Status:    Intimate Partner Violence:     Fear of Current or Ex-Partner:     Emotionally Abused:     Physically Abused:     Sexually Abused:      No family history on file. Allergies   Allergen Reactions    PCN [Penicillins]     Vesicare [Solifenacin]      Current Outpatient Medications on File Prior to Visit   Medication Sig Dispense Refill    Quetiapine (SEROQUEL) 25 MG tablet Take 12.5 mg by mouth 2 times daily      guaifenesin (ROBITUSSIN) 100 MG/5ML syrup Take 200 mg by mouth 4 times daily as needed for Cough      aspirin 325 MG tablet Take 325 mg by mouth daily      propranolol (INDERAL) 20 MG tablet Take 20 mg by mouth nightly      donepezil (ARICEPT) 5 MG tablet Take 5 mg by mouth nightly      diclofenac sodium (VOLTAREN) 1 % GEL Apply 2 g topically 4 times daily To affected shoulders 240 g 5    acetaminophen (TYLENOL) 500 MG tablet Take 500 mg by mouth 2 times daily      diltiazem (DILACOR XR) 240 MG extended release capsule Take 240 mg by mouth daily      docusate sodium (COLACE) 100 MG capsule Take 100 mg by mouth 2 times daily      hydrochlorothiazide (HYDRODIURIL) 12.5 MG tablet Take 12.5 mg by mouth daily      meclizine (ANTIVERT) 25 MG tablet Take 25 mg by mouth 2 times daily       potassium chloride (KLOR-CON M) 10 MEQ extended release tablet Take 10 mEq by mouth daily      Cholecalciferol (VITAMIN D) 2000 units CAPS capsule Take 2,000 Units by mouth daily       No current facility-administered medications on file prior to visit. Review of Systems   Constitutional: Positive for fatigue.  Negative for activity change, appetite change, chills, diaphoresis, fever and unexpected weight change. HENT: Negative for drooling, hearing loss, mouth sores, sore throat, trouble swallowing and voice change. Eyes: Negative for discharge and visual disturbance. Respiratory: Negative for apnea, cough, choking, chest tightness, shortness of breath, wheezing and stridor. Cardiovascular: Negative for chest pain, palpitations and leg swelling. Gastrointestinal: Negative for abdominal distention, abdominal pain, anal bleeding, blood in stool, constipation, diarrhea, nausea, rectal pain and vomiting. Genitourinary: Negative for difficulty urinating, dysuria, enuresis, frequency and hematuria. Musculoskeletal: Positive for arthralgias. Negative for back pain, gait problem, joint swelling and myalgias. Skin: Negative for color change, pallor, rash and wound. Allergic/Immunologic: Negative for food allergies and immunocompromised state. Neurological: Negative for dizziness, tremors, seizures, syncope, facial asymmetry, speech difficulty, weakness, light-headedness, numbness and headaches. Hematological: Negative for adenopathy. Does not bruise/bleed easily. Psychiatric/Behavioral: Positive for hallucinations. Negative for agitation, behavioral problems, confusion, decreased concentration, dysphoric mood, self-injury, sleep disturbance and suicidal ideas. The patient is nervous/anxious. The patient is not hyperactive. Objective: There were no vitals taken for this visit. Physical Exam  Constitutional:       General: She is not in acute distress. Appearance: She is well-developed. She is not diaphoretic. HENT:      Head: Normocephalic and atraumatic. Right Ear: External ear normal.      Left Ear: External ear normal.      Nose: Nose normal.      Mouth/Throat:      Pharynx: No oropharyngeal exudate. Eyes:      General: No scleral icterus. Right eye: No discharge.          Left eye: No discharge. Conjunctiva/sclera: Conjunctivae normal.      Pupils: Pupils are equal, round, and reactive to light. Neck:      Thyroid: No thyromegaly. Vascular: No JVD. Trachea: No tracheal deviation. Cardiovascular:      Rate and Rhythm: Normal rate and regular rhythm. Heart sounds: Murmur heard. Pulmonary:      Effort: Pulmonary effort is normal. No respiratory distress. Breath sounds: Normal breath sounds. No stridor. No wheezing or rales. Chest:      Chest wall: No tenderness. Abdominal:      General: Bowel sounds are normal. There is no distension. Palpations: Abdomen is soft. There is no mass. Tenderness: There is no abdominal tenderness. There is no guarding or rebound. Musculoskeletal:         General: No tenderness or deformity. Normal range of motion. Cervical back: Normal range of motion and neck supple. Lymphadenopathy:      Cervical: No cervical adenopathy. Skin:     General: Skin is warm and dry. Capillary Refill: Capillary refill takes less than 2 seconds. Findings: No erythema or rash. Neurological:      Mental Status: She is alert. Mental status is at baseline. Psychiatric:         Attention and Perception: Attention and perception normal.         Speech: Speech normal.         Behavior: Behavior normal.         Thought Content: Thought content normal.         Assessment:       Diagnosis Orders   1. Late onset Alzheimer's disease with behavioral disturbance ([address]     2. Behavioral and psychological symptoms of dementia ([address]     3. Primary osteoarthritis involving multiple joints     4. Palliative care encounter            Plan:    -Dementia with behavioral disturbance  Monitor for signs and symptoms of delirium as due to advanced age, history of cognitive difficulty, acute illness, and multiple co morbidities patient is high risk.  Offer continuous behavior redirection and orientation, avoid delirium inducing medication, expose to natural sunlight as much as possible, calm surroundings as much as possible. Continue Seroquel 12.5 mg BID, Aricept, and lexapro. Multi-joint pain related to immobility and OA  - Continue Tramadol 50 mg po every 12 hours prn moderate to severe pain  - Continue Acetaminophen and diclofenac gel  - Heat or ice to painful areas, whichever is preferred  - Gentle ROM exercises    Palliative Care  Estimated Fast 6E  PPS 40%  Post Covid Syndrome        No follow-ups on file. Myron Kenyon CNP                      Assessment:       Diagnosis Orders   1. Palliative care encounter     2. Pain of both shoulder joints     3. Osteoarthritis of multiple joints, unspecified osteoarthritis type     4. Late onset [facility]'s disease with [facility] disturbance ([facility]            Plan:      No orders of the defined types were placed in this encounter. No orders of the defined types were placed in this encounter. No follow-ups on file. Myron Kenyon, JESSENIA - CNP            Total Time 45 mins      Total time includes reviewing labs, reviewing history, medications, and allergies, counseling patient, performing medically appropriate evaluation and examination, discussing care with healthcare providers,family, and documenting in the medical record. Total Time 45 mins      Total time includes reviewing labs, reviewing history, medications, and allergies, counseling patient, performing medically appropriate evaluation and examination, discussing care with healthcare providers,family, and documenting in the medical record.

## 2022-09-22 ENCOUNTER — OFFICE VISIT (OUTPATIENT)
Dept: GERIATRIC MEDICINE | Age: 87
End: 2022-09-22
Payer: MEDICARE

## 2022-09-22 DIAGNOSIS — Z91.81 RISK FOR FALLS: ICD-10-CM

## 2022-09-22 DIAGNOSIS — M19.90 ARTHRITIS: ICD-10-CM

## 2022-09-22 DIAGNOSIS — N32.81 DETRUSOR INSTABILITY: Primary | ICD-10-CM

## 2022-09-22 PROCEDURE — 1123F ACP DISCUSS/DSCN MKR DOCD: CPT | Performed by: NURSE PRACTITIONER

## 2022-09-27 NOTE — PROGRESS NOTES
[facility]  [address] [facility], [address] [place] [address]    Assisted living apartment    9/22/2022    Eugene Malik  is a 80 y.o. in the NF being seen for a f/u of   Chief Complaint   Patient presents with    3 Month Follow-Up         Spoke to nursing and ancillary staff regarding patient status, present state of health. Need for nursing care and assistance. Reviewed chart, labs, medications in NF chart and allergies. Aging in place at this time. HPI lives in  assisted living. Being seen for chronic illnesses. Reports from nurse, pt is at baseline, no change in vision, hearing. No headaches or choking issues. Uses walker and assist of nursing for locomotion in facility. No decrease in appetite, no change in B/B habits. But she states she is incontinent more of bladder. No pain crises. But her joints apolinar her shoulder bother her more when she walks with walker. NO fevers. Can make needs known to nurse. On the basis of positive falls risk screening, assessment and plan is as follows: patient declines any further evaluation/treatment for increased falls risk, states she will continue to rely on her nurse aide to get her around the apartment and she will use wheel chair for long distances. Her vision is worse and they cannot do anything to improve that . Past Medical History:   Diagnosis Date    Benign essential HTN 11/17/2019    Constipation 7/26/2016    Detrusor instability 11/17/2019    Dyslipidemia 11/17/2019    Heart valve disease 11/17/2019    Longstanding persistent atrial fibrillation ([address] 11/17/2019    Primary osteoarthritis involving multiple joints 11/17/2019     History reviewed. No pertinent surgical history. History reviewed. No pertinent family history. Social History     Socioeconomic History    Marital status:       Spouse name: Not on file    Number of children: Not on file    Years of education: Not on file    Highest education level: Not on file Occupational History    Not on file   Tobacco Use    Smoking status: Never    Smokeless tobacco: Never   Substance and Sexual Activity    Alcohol use: Not on file    Drug use: Not on file    Sexual activity: Not on file   Other Topics Concern    Not on file   Social History Narrative    Not on file     Social Determinants of Health     Financial Resource Strain: Not on file   Food Insecurity: Not on file   Transportation Needs: Not on file   Physical Activity: Not on file   Stress: Not on file   Social Connections: Not on file   Intimate Partner Violence: Not on file   Housing Stability: Not on file       Allergies: Pcn [penicillins] and Vesicare [solifenacin]  NF MEDICATIONS REVIEWED    ROS:  See HPI  Constitutional: There are no reports of behavioral issues, change in appetite, fever, or worse weakness. No gross bleeding issues. No difficulty ambulating to meals or activities. She eats in her room. Respiratory: denies SOB, dyspnea,   Cardiovascular: denies CP, lightheadedness,   GI: No reports of change of bowel habits, no N/V/D/C. : pt reports of change in bladder habits  Extremities: pt reports of shoulder pain issues, no  chronic edema  Ultram bid q 12 h prn is not being utilized to max, tylenol 500mg is routine  Skin: denies wounds  Psych: no reports of unacceptable behaviors, no reports of depression    Physical exam:   Pulse 60   Temp 97.6 °F (36.4 °C)   Resp 16   SpO2 96%   Constitutional: Awake and alert sitting up in chair, neat appearance, good grooming  HEENT: normocephalic, PEAR, MMM, no cyanosis. NO neck mass visualized , facial asymmetry at baseine.    Cardiovascular: Regular rate  Respiratory: LCTA, unlabored respirations  GI: abdomen ND  : no bladder tenderness, urine incontinence at my exam   Extremities: difficulty moving arms lateral and abduction b/l.   no edema,  Psych: pleasant and able to follow simple commands, normal thought content, speech clear    ASSESSMENT:     Diagnosis Orders   1. Detrusor instability        2. Arthritis        3. Risk for falls            PLAN:   Has had anticholinergics in past with no improvement. Diuretics are low dose. No further treatment. Offer Ultram more often to utilize, may need to order routine rather than prn     Assisted living for locomotion and ataxia difficulties         Return in about 3 months (around 12/22/2022). Pertinent POC, labs, have been reviewed, continue same. Encourage fluids and good nutrition. Stress fall prevention strategies. Pt/POA agrees to Maximilian Rico, APRN-CNP      Maynor Dyer, DNP, MSN, RN, GNP-BC, NP-C  Adult/Kishore Nurse Practitioner  [address]  Department of Geriatrics  8:30am-4:30pm   [phone]  After hours Answering service [phone]      Please note this report is partially produced by using speech recognition hardware. It may contain errors related to the system, including grammar, punctuation and spelling as well as words and phrases that may seem inaccurate.   For any questions or concerns feel free to contact me for clarification

## 2022-09-28 VITALS — OXYGEN SATURATION: 96 % | RESPIRATION RATE: 16 BRPM | HEART RATE: 60 BPM | TEMPERATURE: 97.6 F

## 2022-11-02 ENCOUNTER — OFFICE VISIT (OUTPATIENT)
Dept: GERIATRIC MEDICINE | Age: 87
End: 2022-11-02
Payer: MEDICARE

## 2022-11-02 DIAGNOSIS — R63.0 DECREASED APPETITE: ICD-10-CM

## 2022-11-02 DIAGNOSIS — F02.818 LATE ONSET ALZHEIMER'S DISEASE WITH BEHAVIORAL DISTURBANCE (HCC): ICD-10-CM

## 2022-11-02 DIAGNOSIS — R41.0 CONFUSION: ICD-10-CM

## 2022-11-02 DIAGNOSIS — R09.02 HYPOXIA: Primary | ICD-10-CM

## 2022-11-02 DIAGNOSIS — R09.89 ABNORMAL LUNG SOUNDS: ICD-10-CM

## 2022-11-02 DIAGNOSIS — G30.1 LATE ONSET ALZHEIMER'S DISEASE WITH BEHAVIORAL DISTURBANCE (HCC): ICD-10-CM

## 2022-11-02 PROCEDURE — 1123F ACP DISCUSS/DSCN MKR DOCD: CPT | Performed by: NURSE PRACTITIONER

## 2022-11-03 LAB
ALBUMIN SERPL-MCNC: 3.3 G/DL
ALP BLD-CCNC: 41 U/L
ALT SERPL-CCNC: 11 U/L
ANION GAP SERPL CALCULATED.3IONS-SCNC: NORMAL MMOL/L
AST SERPL-CCNC: 12 U/L
BASOPHILS ABSOLUTE: 0.1 /ΜL
BASOPHILS RELATIVE PERCENT: 1 %
BILIRUB SERPL-MCNC: 0.7 MG/DL (ref 0.1–1.4)
BUN BLDV-MCNC: 25 MG/DL
CALCIUM SERPL-MCNC: 8.8 MG/DL
CHLORIDE BLD-SCNC: 104 MMOL/L
CO2: 31 MMOL/L
CREAT SERPL-MCNC: 0.8 MG/DL
EOSINOPHILS ABSOLUTE: 0.2 /ΜL
EOSINOPHILS RELATIVE PERCENT: 2.8 %
GFR CALCULATED: NORMAL
GLUCOSE BLD-MCNC: 68 MG/DL
HCT VFR BLD CALC: 36.5 % (ref 36–46)
HEMOGLOBIN: 12.1 G/DL (ref 12–16)
LYMPHOCYTES ABSOLUTE: 1.4 /ΜL
LYMPHOCYTES RELATIVE PERCENT: 16.3 %
MCH RBC QN AUTO: 32.2 PG
MCHC RBC AUTO-ENTMCNC: 33.1 G/DL
MCV RBC AUTO: 97.1 FL
MONOCYTES ABSOLUTE: 0.9 /ΜL
MONOCYTES RELATIVE PERCENT: 10.7 %
NEUTROPHILS ABSOLUTE: 5.9 /ΜL
NEUTROPHILS RELATIVE PERCENT: 69.2 %
PLATELET # BLD: 221 K/ΜL
PMV BLD AUTO: 9.1 FL
POTASSIUM SERPL-SCNC: 3.5 MMOL/L
RBC # BLD: 3.76 10^6/ΜL
SODIUM BLD-SCNC: 146 MMOL/L
TOTAL PROTEIN: 6
WBC # BLD: 8.6 10^3/ML

## 2022-11-04 NOTE — PROGRESS NOTES
[facility]  [address]      Assisted living apartment    11/2/2022    Vonnie Malik  is a 80 y.o. in the NF being seen for a f/u of   Chief Complaint   Patient presents with    Other     hypoxia       Spoke to nursing and ancillary staff regarding patient status, present state of health. Need for nursing care and assistance. Reviewed chart, labs, medications in NF chart and allergies. Aging in place at this time. HPI lives in  assisted living. Being seen for c/o from nurse difficult to wake up pt on and off last few ays  No c/o from pt herself    At my visit pt is nearly unresponsive with O2 sat of 64%    Reports from nurse, pt is at baseline, until last few days, no change in vision, hearing. No headaches or choking issues. Uses walker for locomotion in facility. Decrease in appetite today but ate breakfast yesteray, no change in B/B habits. No pain crises. NO fevers. Can make needs known to nurse. Past Medical History:   Diagnosis Date    Benign essential HTN 11/17/2019    Constipation 7/26/2016    Detrusor instability 11/17/2019    Dyslipidemia 11/17/2019    Heart valve disease 11/17/2019    Longstanding persistent atrial fibrillation ([facility] 11/17/2019    Primary osteoarthritis involving multiple joints 11/17/2019     History reviewed. No pertinent surgical history. History reviewed. No pertinent family history. Social History     Socioeconomic History    Marital status:       Spouse name: Not on file    Number of children: Not on file    Years of education: Not on file    Highest education level: Not on file   Occupational History    Not on file   Tobacco Use    Smoking status: Never    Smokeless tobacco: Never   Substance and Sexual Activity    Alcohol use: Not on file    Drug use: Not on file    Sexual activity: Not on file   Other Topics Concern    Not on file   Social History Narrative    Not on file     Social Determinants of Health     Financial Resource Strain: Not on file   Food Insecurity: Not on file   Transportation Needs: Not on file   Physical Activity: Not on file   Stress: Not on file   Social Connections: Not on file   Intimate Partner Violence: Not on file   Housing Stability: Not on file       Allergies: Pcn [penicillins] and Vesicare [solifenacin]  NF MEDICATIONS REVIEWED    ROS:  See HPI  Constitutional: There are no reports of behavioral issues,  fever. No gross bleeding issues. some difficulty ambulating. Respiratory: no reports SOB, dyspnea,   Cardiovascular: no reports  CP, lightheadedness,   GI: No reports of change of bowel habits, no N/V/D/C. : no reports of change in bladder habits  Extremities: No reports of uncontrolled pain issues, no  chronic edema  Skin: denies wounds  Psych: no reports of unacceptable behaviors, no reports of depression    Physical exam:   97.4-70-16  120/92  93% with O2 3L NC    Constitutional: sitting up in chair, not able to open eyes. Answers some simple questions but not consistent. Continues to fall asleep or become unresponsive. HEENT: normocephalic, mucous membranes dry, no cyanosis. NO neck mass visualized , facial asymmetry intact at baseline. Cardiovascular: Regular rate  Respiratory: bases rales,  diminished RLL, unlabored respirations  GI: abdomen ND  : no bladder tenderness,  urine incontinence at my exam   Extremities: no edema,  Psych: flat, speech slurred    ASSESSMENT:     Diagnosis Orders   1. Hypoxia        2. Abnormal lung sounds        3. Decreased appetite        4. Late onset Alzheimer's disease with behavioral disturbance ([address]        5. Confusion            PLAN:     U/A C & S   Keep prn Meclizine    CXR stat r/o CHF or infection,  CMP  CBC w/ diff  D/c  alendronate since not able to be awake to swallow properly cannot crush  D/c lasix she is not drinking  D/c meclizine she is too sedated.   D/c KCl  Dquetiapine bid and reduce to once daily at HS      TCT sebastien Daune Pulling, messages left   Return if symptoms worsen or fail to improve. Pertinent POC, labs, have been reviewed, continue same. Encourage fluids and good nutrition. Stress fall prevention strategies. Pt/POA agrees to Maximilian Rico, APRN-CNP      Luisa Macdonald, DNP, MSN, RN, GNP-BC, NP-C  Adult/Kishore Nurse Practitioner  [facility]  Department of Geriatrics  8:30am-4:30pm   [phone]  After hours Answering service [phone]      Please note this report is partially produced by using speech recognition hardware. It may contain errors related to the system, including grammar, punctuation and spelling as well as words and phrases that may seem inaccurate.   For any questions or concerns feel free to contact me for clarification

## 2022-11-09 ENCOUNTER — OFFICE VISIT (OUTPATIENT)
Dept: GERIATRIC MEDICINE | Age: 87
End: 2022-11-09
Payer: MEDICARE

## 2022-11-09 DIAGNOSIS — J18.9 PNEUMONIA DUE TO INFECTIOUS ORGANISM, UNSPECIFIED LATERALITY, UNSPECIFIED PART OF LUNG: ICD-10-CM

## 2022-11-09 DIAGNOSIS — G30.1 LATE ONSET ALZHEIMER'S DISEASE WITH BEHAVIORAL DISTURBANCE (HCC): ICD-10-CM

## 2022-11-09 DIAGNOSIS — F03.918 BEHAVIORAL AND PSYCHOLOGICAL SYMPTOMS OF DEMENTIA: ICD-10-CM

## 2022-11-09 DIAGNOSIS — H81.10 BENIGN PAROXYSMAL POSITIONAL VERTIGO, UNSPECIFIED LATERALITY: ICD-10-CM

## 2022-11-09 DIAGNOSIS — F02.818 LATE ONSET ALZHEIMER'S DISEASE WITH BEHAVIORAL DISTURBANCE (HCC): ICD-10-CM

## 2022-11-09 DIAGNOSIS — M15.9 OSTEOARTHRITIS OF MULTIPLE JOINTS, UNSPECIFIED OSTEOARTHRITIS TYPE: Primary | ICD-10-CM

## 2022-11-09 PROCEDURE — 1123F ACP DISCUSS/DSCN MKR DOCD: CPT | Performed by: NURSE PRACTITIONER

## 2022-11-15 ENCOUNTER — OFFICE VISIT (OUTPATIENT)
Dept: PALLATIVE CARE | Age: 87
End: 2022-11-15
Payer: MEDICARE

## 2022-11-15 VITALS
TEMPERATURE: 98 F | BODY MASS INDEX: 26.83 KG/M2 | OXYGEN SATURATION: 98 % | HEART RATE: 69 BPM | SYSTOLIC BLOOD PRESSURE: 120 MMHG | DIASTOLIC BLOOD PRESSURE: 78 MMHG | WEIGHT: 142 LBS

## 2022-11-15 DIAGNOSIS — R44.3 HALLUCINATIONS: ICD-10-CM

## 2022-11-15 DIAGNOSIS — M15.9 OSTEOARTHRITIS OF MULTIPLE JOINTS, UNSPECIFIED OSTEOARTHRITIS TYPE: ICD-10-CM

## 2022-11-15 DIAGNOSIS — F02.818 LATE ONSET ALZHEIMER'S DISEASE WITH BEHAVIORAL DISTURBANCE (HCC): Primary | ICD-10-CM

## 2022-11-15 DIAGNOSIS — M25.519 ARTHRALGIA OF SHOULDER, UNSPECIFIED LATERALITY: ICD-10-CM

## 2022-11-15 DIAGNOSIS — Z51.5 PALLIATIVE CARE ENCOUNTER: ICD-10-CM

## 2022-11-15 DIAGNOSIS — Z74.09 IMMOBILITY: ICD-10-CM

## 2022-11-15 DIAGNOSIS — G30.1 LATE ONSET ALZHEIMER'S DISEASE WITH BEHAVIORAL DISTURBANCE (HCC): Primary | ICD-10-CM

## 2022-11-15 PROCEDURE — 1123F ACP DISCUSS/DSCN MKR DOCD: CPT

## 2022-11-15 RX ORDER — NYSTATIN 100000 U/G
CREAM TOPICAL
COMMUNITY
Start: 2022-08-27

## 2022-11-15 RX ORDER — HYDROCHLOROTHIAZIDE 25 MG/1
1 TABLET ORAL DAILY
COMMUNITY
Start: 2022-11-10

## 2022-11-15 RX ORDER — FUROSEMIDE 20 MG/1
1 TABLET ORAL EVERY OTHER DAY
COMMUNITY
Start: 2022-10-04

## 2022-11-15 ASSESSMENT — VISUAL ACUITY: OU: 1

## 2022-11-15 NOTE — PROGRESS NOTES
Subjective:      Patient Id: Seen at [facility] DR. BECCA SANTIAGO for symptom management. She was accompanied to the appointment by: self. Chief Complaint   Patient presents with    Dementia    Follow-up    Other     Alzheimer's        HPI       Jose F Malik is a 80 y.o. female seen and examined for symptomatic management related to behavorial and psychological symptoms of Late onset Alzheimer's disease,OA, pain. [facility] has complex medical history that includes Alzheimer's, HTN, HLD, Vertigo, A fib, depression, and heart valve disease. Home visit is necessary in lieu of office due to significant frailty and high symptom burden from comorbid illnesses. General: Pt is calm, cooperative and in NAD. Patient complaints. Patient was only alert and oriented times self during assessment. Patient is wheelchair bound and can transfer with the assistance X1 person. SOB and edema at baseline. Negative excessive fatigue, fever, chills, myalgias, increased sputum production or cough, nausea, vomiting, chest pain, or orthopnea. I attempted to contact patient's Dank Chairez, she is unavailable until after 3 PM.  I will try to touch base with her later in the day today. Pain: Can use Tylenol 500 mg po Tid,Voltaren gel qid, Tramadol 50 mg po every 12 hours prn moderate to severe pain. Last time patient had ultram is on 9/20/2022. Able to move both arms today without difficulty, though she cannot raise them above shoulder level without discomfort. She tells me her right upper extremity has discomfort after therapy patient was unable to verbalize pain on a scale but face was relaxed and there was no grimacing during movement. Cognition/Alzheimer's/hallucinations/mood: Complains of hallucinations (audio and visual), patient on Seroquel 12.5 mg BID. She is able to be redirected and calmed down when disoriented. Patient has audio and visual hallucinations. Staff has to reassure patient and this helps to calm her down.   Patient cognition and physical abilities waxes and wanes. Sleep:Negative significant Sleep disturbance, depression, anxiety, or agitation episodes. GI/: Patient is incontinent of B&BM and requires varying levels of care R/T her cognition and physical abilities for the day. Assistance of typically X1 but can be X2 assistance at times. Skin: Per nursing staff patient has Rt arm axilla that is red but intact. It is at it's baseline and being treated with antifungal cream.      Past Medical History:   Diagnosis Date    Benign essential HTN 11/17/2019    Constipation 7/26/2016    Detrusor instability 11/17/2019    Dyslipidemia 11/17/2019    Heart valve disease 11/17/2019    Longstanding persistent atrial fibrillation ([facility] [address] 11/17/2019    Primary osteoarthritis involving multiple joints 11/17/2019     History reviewed. No pertinent surgical history. Social History     Socioeconomic History    Marital status:      Spouse name: Not on file    Number of children: Not on file    Years of education: Not on file    Highest education level: Not on file   Occupational History    Not on file   Tobacco Use    Smoking status: Never    Smokeless tobacco: Never   Substance and Sexual Activity    Alcohol use: Not on file    Drug use: Not on file    Sexual activity: Not on file   Other Topics Concern    Not on file   Social History Narrative    Not on file     Social Determinants of Health     Financial Resource Strain: Not on file   Food Insecurity: Not on file   Transportation Needs: Not on file   Physical Activity: Not on file   Stress: Not on file   Social Connections: Not on file   Intimate Partner Violence: Not on file   Housing Stability: Not on file     History reviewed. No pertinent family history.   Allergies   Allergen Reactions    Pcn [Penicillins]     Vesicare [Solifenacin]      Current Outpatient Medications on File Prior to Visit   Medication Sig Dispense Refill    furosemide (LASIX) 20 MG tablet Take 1 tablet by mouth every other day On M, W, F      hydroCHLOROthiazide (HYDRODIURIL) 25 MG tablet Take 1 tablet by mouth daily      traMADol (ULTRAM) 50 MG tablet Take 1 tablet by mouth every 12 hours as needed for Pain for up to 30 days.  60 tablet 0    alendronate (FOSAMAX) 70 MG tablet Take 70 mg by mouth every 7 days Indications: Osteoporosis      BACITRACIN-POLYMYXIN B, OPHTH, OINT Place 1 each into the right eye nightly 1/4\" strip      calcium carbonate 600 MG TABS tablet Take 2 tablets by mouth daily Indications: Nutritional Support      PSYLLIUM PO Take 15 g by mouth daily as needed      sodium chloride (CHELO 128) 5 % ophthalmic solution 1 drop every 4 hours      sodium chloride 5 % ophthalmic ointment Place 1 drop into the right eye daily as needed Indications: Dry Eyes Lower eye lid      nystatin (MYCOSTATIN) 454958 UNIT/GM powder Apply topically daily as needed Indications: Fungal Dermatitis Apply to right Axilla      polyethyl glycol-propyl glycol 0.4-0.3 % (SYSTANE) 0.4-0.3 % ophthalmic solution Place 1 drop into both eyes 2 times daily Indications: Dry Eyes      QUEtiapine (SEROQUEL) 25 MG tablet Take 12.5 mg by mouth 2 times daily Indications: Behavioral Disorders associated with Dementia 0.5 tab=12.5mg      guaiFENesin (ROBITUSSIN) 100 MG/5ML syrup Take 200 mg by mouth 4 times daily as needed for Cough      aspirin 325 MG tablet Take 325 mg by mouth daily Indications: Preventative Treatment       propranolol (INDERAL) 20 MG tablet Take 20 mg by mouth nightly Indications: High Blood Pressure Disorder, Involuntary Quivering       diclofenac sodium (VOLTAREN) 1 % GEL Apply 2 g topically 4 times daily To affected shoulders (Patient taking differently: Apply 2 g topically daily Indications: Pain associated with Arthritis To affected shoulders) 240 g 5    acetaminophen (TYLENOL) 500 MG tablet Take 500 mg by mouth in the morning, at noon, and at bedtime Indications: Pain       diltiazem (DILACOR XR) 240 MG extended release capsule Take 240 mg by mouth daily Indications: High Blood Pressure Disorder       docusate sodium (COLACE) 100 MG capsule Take 100 mg by mouth 2 times daily Indications: Constipation       meclizine (ANTIVERT) 25 MG tablet Take 25 mg by mouth three times daily      nystatin (MYCOSTATIN) 172463 UNIT/GM cream        No current facility-administered medications on file prior to visit. Review of Systems   Constitutional: Negative. Neurological:  Positive for dizziness (Intermittently) and weakness. Negative for speech difficulty, numbness and headaches (Occasional). Psychiatric/Behavioral:  Positive for confusion. Objective:   /78 (Site: Right Upper Arm, Position: Sitting)   Pulse 69   Temp 98 °F (36.7 °C) (Temporal)   Wt 142 lb (64.4 kg)   SpO2 98%   BMI 26.83 kg/m²    Wt Readings from Last 3 Encounters:   11/15/22 142 lb (64.4 kg)   04/05/22 152 lb 6.4 oz (69.1 kg)   11/12/21 160 lb (72.6 kg)     Physical Exam  Vitals and nursing note reviewed. Constitutional:       General: She is awake. She is not in acute distress. Appearance: Normal appearance. She is obese. Comments: Confused   HENT:      Head: Normocephalic and atraumatic. No right periorbital erythema or left periorbital erythema. Jaw: There is normal jaw occlusion. Right Ear: External ear normal. Decreased hearing noted. No laceration. Left Ear: External ear normal. Decreased hearing noted. No laceration. Nose: Nose normal. No nasal deformity, signs of injury, laceration or rhinorrhea. Mouth/Throat:      Lips: Pink. No lesions. Mouth: Mucous membranes are moist. No injury, lacerations or angioedema. Eyes:      General: Lids are normal. Vision grossly intact. Gaze aligned appropriately. No scleral icterus. Right eye: No discharge. Left eye: No discharge. Extraocular Movements: Extraocular movements intact.       Conjunctiva/sclera: Conjunctivae normal.      Comments: Right periorbital edema +1   Cardiovascular:      Rate and Rhythm: Normal rate. Rhythm irregular. Pulses: Normal pulses. Heart sounds: Normal heart sounds. Pulmonary:      Effort: Pulmonary effort is normal. No respiratory distress. Breath sounds: Decreased breath sounds present. No wheezing or rhonchi. Abdominal:      General: Abdomen is protuberant. Bowel sounds are normal. There is no distension. Palpations: Abdomen is soft. Tenderness: There is no abdominal tenderness. Musculoskeletal:      Cervical back: Normal range of motion. No signs of trauma or rigidity. No pain with movement. Right lower leg: No edema. Left lower leg: No edema. Skin:     General: Skin is cool and dry. Coloration: Skin is pale. Skin is not ashen or jaundiced. Findings: Erythema and rash present. No abrasion. Comments: Right axilla redness   Neurological:      General: No focal deficit present. Mental Status: She is alert. Mental status is at baseline. She is disoriented and confused. Comments: Alert and oriented x0-1   Psychiatric:         Attention and Perception: Attention normal. She perceives auditory and visual hallucinations. Mood and Affect: Mood normal.         Speech: Speech normal.         Behavior: Behavior normal. Behavior is cooperative. Cognition and Memory: Cognition is impaired. Memory is impaired. Assessment and Plan:      1. Late onset Alzheimer's disease with behavioral disturbance ([facility] [address]  2. Hallucinations  -Continue with current plan of care, no changes  -Monitor for signs and symptoms of delirium as due to advanced age, history of cognitive difficulty, acute illness, and multiple co morbidities patient is high risk. Offer continuous behavior redirection and orientation, avoid delirium inducing medication, expose to natural sunlight as much as possible, calm surroundings as much as possible.   -Continue Seroquel 12.5 mg BID    3. Osteoarthritis of multiple joints, unspecified osteoarthritis type  4. Arthralgia of shoulder, unspecified laterality  -Continue with current plan of care  -Continue Tramadol 50 mg po every 12 hours prn moderate to severe pain  - Continue Acetaminophen and diclofenac gel  - Heat or ice to painful areas, whichever is preferred  - Gentle ROM exercises  -We will consider discontinuing tramadol related to infrequent use, will ask POA if they feel this is appropriate    5. Immobility  -Utilize zinc/A&D ointment to area of skin deterioration  - Keep area clean and dry, if incontinent clean and pat dry to avoid further skin damage.  - Encouraged patient to turn, reposition and increase activity to prevent continued pressure/deterioration to area. -Patient currently sits for long period of hours in her chair without moving. Education was given on interventions (ex. distraction, rewards, participating in favorite games). - Educated patient on difference/benefits of zinc based paste and A&D ointment. -DME Order for (pressure redistribution cushion) as OP to prevent further skin breakdown and to offload the area of pressure. 6. Palliative care encounter  -Discussed symptom management related to chronic disease/condition. Provided emotional support and active listening. Patient understands and is agreeable to current plan. 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 Medications Discontinued During This Encounter   Medication Reason    hydrochlorothiazide (HYDRODIURIL) 12.5 MG tablet        Discussed with patient/surrogate: POC, Treatment risks/benefits, and alternatives including as noted above. All questions were answered. Gave much active listening, presence, and emotional support.    Due to acuity, symptomatology and high-risk medication management,   I advised patient to Return in about 13 weeks (around 2/14/2023). Total Time 45 mins   > 50% Time Spent Counseling/Care coordination? yes     Loman Cogan, APRN - CNP    Collaborating physician: Dr. Jacy Shaffer       Please note this report is partially produced by using speech recognition hardware. It may contain errors related to the system, including grammar, punctuation and spelling as well as words and phrases that may seem inaccurate. For any questions or concerns feel free to contact me for clarification. Thanks for the opportunity you have allowed us to provide palliative care to [facility]. We will be in touch as care progresses. Please feel free to reach out to us should you have any questions or requests.

## 2022-11-16 ENCOUNTER — OFFICE VISIT (OUTPATIENT)
Dept: GERIATRIC MEDICINE | Age: 87
End: 2022-11-16
Payer: MEDICARE

## 2022-11-16 VITALS
SYSTOLIC BLOOD PRESSURE: 118 MMHG | OXYGEN SATURATION: 97 % | HEART RATE: 68 BPM | HEIGHT: 61 IN | TEMPERATURE: 98 F | RESPIRATION RATE: 16 BRPM | BODY MASS INDEX: 28.32 KG/M2 | WEIGHT: 150 LBS | DIASTOLIC BLOOD PRESSURE: 80 MMHG

## 2022-11-16 DIAGNOSIS — Z00.00 MEDICARE ANNUAL WELLNESS VISIT, SUBSEQUENT: ICD-10-CM

## 2022-11-16 DIAGNOSIS — J69.0 ASPIRATION PNEUMONIA OF RIGHT LOWER LOBE, UNSPECIFIED ASPIRATION PNEUMONIA TYPE (HCC): ICD-10-CM

## 2022-11-16 DIAGNOSIS — Z00.00 ENCOUNTER FOR ANNUAL WELLNESS EXAM IN MEDICARE PATIENT: Primary | ICD-10-CM

## 2022-11-16 PROCEDURE — 1123F ACP DISCUSS/DSCN MKR DOCD: CPT | Performed by: NURSE PRACTITIONER

## 2022-11-16 PROCEDURE — G0439 PPPS, SUBSEQ VISIT: HCPCS | Performed by: NURSE PRACTITIONER

## 2022-11-16 ASSESSMENT — LIFESTYLE VARIABLES
HOW MANY STANDARD DRINKS CONTAINING ALCOHOL DO YOU HAVE ON A TYPICAL DAY: PATIENT DOES NOT DRINK
HOW OFTEN DO YOU HAVE A DRINK CONTAINING ALCOHOL: NEVER

## 2022-11-16 ASSESSMENT — PATIENT HEALTH QUESTIONNAIRE - PHQ9
SUM OF ALL RESPONSES TO PHQ9 QUESTIONS 1 & 2: 0
9. THOUGHTS THAT YOU WOULD BE BETTER OFF DEAD, OR OF HURTING YOURSELF: 0
1. LITTLE INTEREST OR PLEASURE IN DOING THINGS: 0
SUM OF ALL RESPONSES TO PHQ QUESTIONS 1-9: 0
8. MOVING OR SPEAKING SO SLOWLY THAT OTHER PEOPLE COULD HAVE NOTICED. OR THE OPPOSITE, BEING SO FIGETY OR RESTLESS THAT YOU HAVE BEEN MOVING AROUND A LOT MORE THAN USUAL: 0
3. TROUBLE FALLING OR STAYING ASLEEP: 0
2. FEELING DOWN, DEPRESSED OR HOPELESS: 0
SUM OF ALL RESPONSES TO PHQ QUESTIONS 1-9: 0
SUM OF ALL RESPONSES TO PHQ QUESTIONS 1-9: 0
4. FEELING TIRED OR HAVING LITTLE ENERGY: 0
SUM OF ALL RESPONSES TO PHQ QUESTIONS 1-9: 0
10. IF YOU CHECKED OFF ANY PROBLEMS, HOW DIFFICULT HAVE THESE PROBLEMS MADE IT FOR YOU TO DO YOUR WORK, TAKE CARE OF THINGS AT HOME, OR GET ALONG WITH OTHER PEOPLE: 0
7. TROUBLE CONCENTRATING ON THINGS, SUCH AS READING THE NEWSPAPER OR WATCHING TELEVISION: 0
5. POOR APPETITE OR OVEREATING: 0
6. FEELING BAD ABOUT YOURSELF - OR THAT YOU ARE A FAILURE OR HAVE LET YOURSELF OR YOUR FAMILY DOWN: 0

## 2022-11-16 NOTE — PROGRESS NOTES
[facility]  [address] [facility], [address] Medicare Annual Wellness Visit    Abdi Malik is here for Medicare AWV (PNA)    Assessment & Plan   Encounter for annual wellness exam in Medicare patient  Aspiration pneumonia of right lower lobe, unspecified aspiration pneumonia type ([address]      Completed abx with no adverse effects. Symptoms management at this time     Recommendations for Preventive Services Due: see orders and patient instructions/AVS.  Recommended screening schedule for the next 5-10 years is provided to the patient in written form: see Patient Instructions/AVS.     No follow-ups on file. Subjective   The following acute and/or chronic problems were also addressed today:    Patient's complete Health Risk Assessment and screening values have been reviewed and are found in Flowsheets. The following problems were reviewed today and where indicated follow up appointments were made and/or referrals ordered. Positive Risk Factor Screenings with Interventions:    Fall Risk:      Fall Risk Interventions:    NA            General Health and ACP:  General  In general, how would you say your health is?: Fair  In the past 7 days, have you experienced any of the following: New or Increased Pain, New or Increased Fatigue, Loneliness, Social Isolation, Stress or Anger?: (!) Yes (\"so tired\" Had PNA)  Select all that apply: (!) New or Increased Fatigue  Do you get the social and emotional support that you need?: Yes  Do you have a Living Will?: Yes (in paper chart)    Advance Directives       Power of  Living Will ACP-Advance Directive ACP-Power of Rafael Cooper on 03/04/21 Filed on 03/04/21 Filed Leonardo 14 Risk Interventions:  Fatigue: had PNA causing fatigue but she states she feels better. unable to exercise due to severe medical disabilities.      Health Habits/Nutrition:  Physical Activity: Inactive    Days of Exercise per Week: 0 days    Minutes of Exercise per Session: 0 min     Have you lost any weight without trying in the past 3 months?: No  Body mass index: (!) 28.34  Have you seen the dentist within the past year?: N/A - wear dentures (has 5 bottom teeth, upper dentures)  Health Habits/Nutrition Interventions:  Inadequate physical activity:  not able to exercise   Dental exam overdue:  patient declines dental evaluation  She is very frail. Hearing/Vision:  Do you or your family notice any trouble with your hearing that hasn't been managed with hearing aids?: (!) Yes  Do you have difficulty driving, watching TV, or doing any of your daily activities because of your eyesight?: (!) Yes (nearly blind)  Have you had an eye exam within the past year?: (!) No  No results found. Hearing/Vision Interventions:  Vision concerns:  cannot be corrected      ADLs:  In the past 7 days, did you need help from others to perform any of the following everyday activities: Eating, dressing, grooming, bathing, toileting, or walking/balance?: (!) Yes (lives in assisted living building)  Select all that apply: [employer], Grooming, Bathing, Toileting, Walking/Balance, Eating (feeds self after set up)  In the past 7 days, did you need help from others to take care of any of the following: Laundry, housekeeping, banking/finances, shopping, telephone use, food preparation, transportation, or taking medications?: (!) Yes  Select all that apply: [employer], Taking Medications, Housekeeping, Banking/Finances, Shopping, Food Preparation, Transportation  ADL Interventions:  Needs no other interventions she already has assisted living aides and nurses          Objective   Vitals:    11/16/22 1804   BP: 118/80   Pulse: 68   Resp: 16   Temp: 98 °F (36.7 °C)   SpO2: 97%   Weight: 150 lb (68 kg)   Height: 5' 1\" (1.549 m)      Body mass index is 28.34 kg/m².       General Appearance: alert and oriented to person, place and time, well-developed and well-nourished, in no acute distress, in no acute distress, alert, disoriented, frail-appearing, and pale  Skin: warm and dry, no rash or erythema  Head: normocephalic and atraumatic  Eyes: conjunctivae normal  ENT: bilateral external ear normal  Neck: neck supple and non tender without mass and decreased ROM   Pulmonary/Chest: clear to auscultation bilaterally- no wheezes, rales or rhonchi, normal air movement, no respiratory distress  Cardiovascular: normal rate, normal S1 and S2, no gallops, and intact distal pulses  Abdomen: soft, non-tender, non-distended, normal bowel sounds, no masses or organomegaly  Extremities: no cyanosis and no clubbing  Musculoskeletal: joint deformity present-  bilateral knees , ankles, osteoarthritic changes noted in both hands  Neurologic: speech normal and gait abnormal- antalgic, walker   Functional ROM but not full ROM       Allergies   Allergen Reactions    Pcn [Penicillins]     Vesicare [Solifenacin]      Prior to Visit Medications    Medication Sig Taking? Authorizing Provider   furosemide (LASIX) 20 MG tablet Take 1 tablet by mouth every other day On M, W, F  Historical Provider, MD   hydroCHLOROthiazide (HYDRODIURIL) 25 MG tablet Take 1 tablet by mouth daily  Historical Provider, MD   nystatin (MYCOSTATIN) 525334 UNIT/GM cream   Historical Provider, MD   traMADol (ULTRAM) 50 MG tablet Take 1 tablet by mouth every 12 hours as needed for Pain for up to 30 days.   Mehnaz Rodriguez [facility], APRN - CNP   alendronate (FOSAMAX) 70 MG tablet Take 70 mg by mouth every 7 days Indications: Osteoporosis  Historical Provider, MD   Charleen [place], OINT Place 1 each into the right eye nightly 1/4\" strip  Historical Provider, MD   calcium carbonate 600 MG TABS tablet Take 2 tablets by mouth daily Indications: Nutritional Support  Historical Provider, MD   PSYLLIUM PO Take 15 g by mouth daily as needed  Historical Provider, MD   sodium chloride (CHELO 128) 5 % ophthalmic solution 1 drop every 4 hours  Historical Provider, MD   sodium chloride 5 % ophthalmic ointment Place 1 drop into the right eye daily as needed Indications: Dry Eyes Lower eye lid  Historical Provider, MD   nystatin (MYCOSTATIN) 039761 UNIT/GM powder Apply topically daily as needed Indications: Fungal Dermatitis Apply to right Axilla  Historical Provider, MD   polyethyl glycol-propyl glycol 0.4-0.3 % (SYSTANE) 0.4-0.3 % ophthalmic solution Place 1 drop into both eyes 2 times daily Indications: Dry Eyes  Historical Provider, MD   QUEtiapine (SEROQUEL) 25 MG tablet Take 12.5 mg by mouth 2 times daily Indications: Behavioral Disorders associated with Dementia 0.5 tab=12.5mg  Historical Provider, MD   guaiFENesin (ROBITUSSIN) 100 MG/5ML syrup Take 200 mg by mouth 4 times daily as needed for Cough  Historical Provider, MD   aspirin 325 MG tablet Take 325 mg by mouth daily Indications: Preventative Treatment   Historical Provider, MD   propranolol (INDERAL) 20 MG tablet Take 20 mg by mouth nightly Indications: High Blood Pressure Disorder, Involuntary Quivering   Historical Provider, MD   diclofenac sodium (VOLTAREN) 1 % GEL Apply 2 g topically 4 times daily To affected shoulders  Patient taking differently: Apply 2 g topically daily Indications: Pain associated with Arthritis To affected shoulders  Stacy Mathew, JESSENIA - CNP   acetaminophen (TYLENOL) 500 MG tablet Take 500 mg by mouth in the morning, at noon, and at bedtime Indications: Pain   Historical Provider, MD   diltiazem (DILACOR XR) 240 MG extended release capsule Take 240 mg by mouth daily Indications: High Blood Pressure Disorder   Historical Provider, MD   docusate sodium (COLACE) 100 MG capsule Take 100 mg by mouth 2 times daily Indications: Constipation   Historical Provider, MD   meclizine (ANTIVERT) 25 MG tablet Take 25 mg by mouth three times daily  Historical Provider, MD [place] (Including outside providers/suppliers regularly involved in providing care):   Patient Care Team:  Natan Handy MD as PCP - General (Internal Medicine)  Natan Handy MD as PCP - REHABILITATION [facility] Empaneled Provider  JESSENIA Huston CNP as Nurse Practitioner (Palliative Medicine)     Reviewed and updated this visit:  Allergies  Meds  Problems

## 2022-11-16 NOTE — PATIENT INSTRUCTIONS
Personalized Preventive Plan for Dory Malik - 11/16/2022  Medicare offers a range of preventive health benefits. Some of the tests and screenings are paid in full while other may be subject to a deductible, co-insurance, and/or copay. Some of these benefits include a comprehensive review of your medical history including lifestyle, illnesses that may run in your family, and various assessments and screenings as appropriate. After reviewing your medical record and screening and assessments performed today your provider may have ordered immunizations, labs, imaging, and/or referrals for you. A list of these orders (if applicable) as well as your Preventive Care list are included within your After Visit Summary for your review. Other Preventive Recommendations:    A preventive eye exam performed by an eye specialist is recommended every 1-2 years to screen for glaucoma; cataracts, macular degeneration, and other eye disorders. A preventive dental visit is recommended every 6 months. Try to get at least 150 minutes of exercise per week or 10,000 steps per day on a pedometer . Order or download the FREE \"Exercise & Physical Activity: Your Everyday Guide\" from [employer]. Call 7-603.950.5424 or search [employer] online. You need 1040-9890 mg of calcium and 1547-0601 IU of vitamin D per day. It is possible to meet your calcium requirement with diet alone, but a vitamin D supplement is usually necessary to meet this goal.  When exposed to the sun, use a sunscreen that protects against both UVA and UVB radiation with an SPF of 30 or greater. Reapply every 2 to 3 hours or after sweating, drying off with a towel, or swimming. Always wear a seat belt when traveling in a car. Always wear a helmet when riding a bicycle or motorcycle.

## 2022-11-20 VITALS
TEMPERATURE: 97.6 F | OXYGEN SATURATION: 96 % | RESPIRATION RATE: 16 BRPM | WEIGHT: 150 LBS | SYSTOLIC BLOOD PRESSURE: 155 MMHG | BODY MASS INDEX: 28.32 KG/M2 | HEART RATE: 68 BPM | HEIGHT: 61 IN | DIASTOLIC BLOOD PRESSURE: 65 MMHG

## 2022-11-20 RX ORDER — DILTIAZEM HYDROCHLORIDE 240 MG/1
240 CAPSULE, EXTENDED RELEASE ORAL EVERY OTHER DAY
Qty: 30 CAPSULE | Refills: 5 | Status: SHIPPED
Start: 2022-11-20

## 2022-11-20 RX ORDER — MECLIZINE HYDROCHLORIDE 25 MG/1
12.5 TABLET ORAL 3 TIMES DAILY PRN
Qty: 30 TABLET | Refills: 2 | Status: SHIPPED
Start: 2022-11-20

## 2022-11-20 RX ORDER — QUETIAPINE FUMARATE 25 MG/1
25 TABLET, FILM COATED ORAL NIGHTLY
Qty: 60 TABLET | Refills: 5 | Status: SHIPPED
Start: 2022-11-20

## 2022-11-20 ASSESSMENT — ENCOUNTER SYMPTOMS
COUGH: 1
ALLERGIC/IMMUNOLOGIC NEGATIVE: 1
GASTROINTESTINAL NEGATIVE: 1

## 2022-11-20 NOTE — PROGRESS NOTES
[facility] [address] [facility]    Assisted living apartment    Subjective: Annual H & P    11/9/2022    Patient ID: Antonio Malik is a 80 y.o. female being seen today for:   Chief Complaint   Patient presents with    H&P     Annual for assisted living        HPI lives in assisted living 9 years. Has long standing vertigo uses wheel chair to self propel on good days. Falls often when using walker, ataxia with multiple OA joints. Dementia with delusions. Recent PNA this month recovering well. Poor short term memory  Fair long term memory  Needs assist/cues with decisions  Needs assist with ADLs  Needs assist with IADLs  Poor hearing  Poorvision, glasses  + Falls risk    Hobbies: likes TV  Bathing 2 x a week      Pt continues to live in assisted living apartment. Spoke to nursing and ancillary staff regarding patient status, present state of health. Need for nursing care and assistance. Reviewed chart, labs, medications in NF chart and allergies. Aging in place at this time. Past Medical History:   Diagnosis Date    Benign essential HTN 11/17/2019    Constipation 7/26/2016    Detrusor instability 11/17/2019    Dyslipidemia 11/17/2019    Heart valve disease 11/17/2019    Longstanding persistent atrial fibrillation ([address] 11/17/2019    Primary osteoarthritis involving multiple joints 11/17/2019     History reviewed. No pertinent surgical history. History reviewed. No pertinent family history. Social History     Socioeconomic History    Marital status:       Spouse name: Not on file    Number of children: Not on file    Years of education: Not on file    Highest education level: Not on file   Occupational History    Not on file   Tobacco Use    Smoking status: Never    Smokeless tobacco: Never   Substance and Sexual Activity    Alcohol use: Not on file    Drug use: Not on file    Sexual activity: Not on file   Other Topics Concern    Not on file   Social History Narrative    Not on file     Social Determinants of Health     Financial Resource Strain: Not on file   Food Insecurity: Not on file   Transportation Needs: Not on file   Physical Activity: Inactive    Days of Exercise per Week: 0 days    Minutes of Exercise per Session: 0 min   Stress: Not on file   Social Connections: Not on file   Intimate Partner Violence: Not on file   Housing Stability: Not on file       Allergies: Pcn [penicillins] and Vesicare [solifenacin]  Current Outpatient Medications on File Prior to Visit   Medication Sig Dispense Refill    traMADol (ULTRAM) 50 MG tablet Take 1 tablet by mouth every 12 hours as needed for Pain for up to 30 days.  60 tablet 0    BACITRACIN-POLYMYXIN B, OPHTH, OINT Place 1 each into the right eye nightly 1/4\" strip      calcium carbonate 600 MG TABS tablet Take 2 tablets by mouth daily Indications: Nutritional Support      PSYLLIUM PO Take 15 g by mouth daily as needed      sodium chloride (CHELO 128) 5 % ophthalmic solution 1 drop every 4 hours      sodium chloride 5 % ophthalmic ointment Place 1 drop into the right eye daily as needed Indications: Dry Eyes Lower eye lid      nystatin (MYCOSTATIN) 386684 UNIT/GM powder Apply topically daily as needed Indications: Fungal Dermatitis Apply to right Axilla      polyethyl glycol-propyl glycol 0.4-0.3 % (SYSTANE) 0.4-0.3 % ophthalmic solution Place 1 drop into both eyes 2 times daily Indications: Dry Eyes      guaiFENesin (ROBITUSSIN) 100 MG/5ML syrup Take 200 mg by mouth 4 times daily as needed for Cough      aspirin 325 MG tablet Take 325 mg by mouth daily Indications: Preventative Treatment       propranolol (INDERAL) 20 MG tablet Take 20 mg by mouth nightly Indications: High Blood Pressure Disorder, Involuntary Quivering       diclofenac sodium (VOLTAREN) 1 % GEL Apply 2 g topically 4 times daily To affected shoulders (Patient taking differently: Apply 2 g topically daily Indications: Pain associated with Arthritis To affected shoulders) 240 g 5    acetaminophen (TYLENOL) 500 MG tablet Take 500 mg by mouth in the morning, at noon, and at bedtime Indications: Pain       docusate sodium (COLACE) 100 MG capsule Take 100 mg by mouth 2 times daily Indications: Constipation        No current facility-administered medications on file prior to visit. Review of Systems   Constitutional:  Positive for activity change and appetite change. Negative for chills and fever. HENT:  Positive for hearing loss. Eyes:  Eye discharge: dry eye. Respiratory:  Positive for cough. Recent PNA   Cardiovascular:  Positive for leg swelling (chronic). Gastrointestinal: Negative. Endocrine: Negative. Genitourinary:  Positive for frequency. Chronic UTIs    Musculoskeletal:  Positive for arthralgias, gait problem, joint swelling and myalgias. OA multi joints   Skin: Negative. Allergic/Immunologic: Negative. Neurological:  Positive for dizziness, facial asymmetry and weakness. Hx Facial droop  Hx vertigo   Hematological: Negative. Psychiatric/Behavioral:  Positive for decreased concentration and hallucinations. Delusions 2/2 dementia     :   BP (!) 155/65   Pulse 68   Temp 97.6 °F (36.4 °C)   Resp 16   Ht 5' 1\" (1.549 m)   Wt 150 lb (68 kg)   SpO2 96%   BMI 28.34 kg/m²     Physical Exam  Vitals and nursing note reviewed. Constitutional:       Comments: Frail  thinner   HENT:      Head: Normocephalic and atraumatic. Right Ear: External ear normal.      Left Ear: External ear normal.      Nose: Nose normal.      Mouth/Throat:      Mouth: Mucous membranes are moist.      Pharynx: Oropharynx is clear. Eyes:      Pupils: Pupils are equal, round, and reactive to light. Comments: Dry eye no crust today  ectropion   Cardiovascular:      Rate and Rhythm: Normal rate and regular rhythm. Pulses: Normal pulses. Heart sounds: Murmur heard. No friction rub. No gallop. Comments: Atrial fib controlled rate   Pulmonary:      Effort: Pulmonary effort is normal.      Breath sounds: Normal breath sounds. No wheezing or rhonchi. Comments: No coughing  Abdominal:      Palpations: Abdomen is soft. Tenderness: There is no abdominal tenderness. There is no guarding. Musculoskeletal:         General: Swelling and deformity present. Cervical back: Normal range of motion and neck supple. Right lower leg: Edema (trace) present. Left lower leg: Edema (trace) present. Comments: Decreased ROM apolinar in shoulders and spine   Multiple joint deformity form OA bone remodeling   Skin:     General: Skin is warm and dry. Capillary Refill: Capillary refill takes 2 to 3 seconds. Findings: Lesion (nasal presumed cancer lesion refuses treatment) present. No bruising. Neurological:      Mental Status: She is alert. Mental status is at baseline. She is disoriented. Motor: No weakness. Coordination: Coordination normal.      Gait: Gait normal.      Comments: Right Facial droop baseline   Psychiatric:         Mood and Affect: Mood normal.         Behavior: Behavior normal.      Comments: Poor judgement  Intermittent abnormal thought content       Assessment:       Diagnosis Orders   1. Osteoarthritis of multiple joints, unspecified osteoarthritis type        2. Late onset Alzheimer's disease with behavioral disturbance ([facility] [address]        3. Behavioral and psychological symptoms of dementia        4. Benign paroxysmal positional vertigo, unspecified laterality        5. Pneumonia due to infectious organism, unspecified laterality, unspecified part of lung              Plan:      OA pain not uncontrolled  Part of ataxia is physical changes in addition to long standing vertigo.      Keep tramadol for pain its q 12 hprn  Diclofenac is 4 x daily     Stopped fosamax when po was poor with aspiration PNA, will consider re ordering if is swallowing ok and does not need a crush med    NO anticoagulants due to falls and danger of brain bleed out weighs stroke risk. Has had progression of YOLI over last few years. Most of her Delusions and hallucinations are gone. She remains on Palliative care, will transition to Hospice when needed. Recovering from PNA, presumed aspiration. Leave Lasix qod, she has poor fluid intake  Cont HCTZ     See med updates     Orders Placed This Encounter   Medications    dilTIAZem (DILACOR XR) 240 MG extended release capsule     Sig: Take 1 capsule by mouth every other day Indications: High Blood Pressure Disorder     Dispense:  30 capsule     Refill:  5    meclizine (ANTIVERT) 25 MG tablet     Sig: Take 0.5 tablets by mouth 3 times daily as needed     Dispense:  30 tablet     Refill:  2    QUEtiapine (SEROQUEL) 25 MG tablet     Sig: Take 1 tablet by mouth at bedtime Indications: Behavioral Disorders associated with Dementia     Dispense:  60 tablet     Refill:  5         Return in about 3 months (around 2/9/2023). Pt/POA agrees to POC    Pertinent POC, medications, labs, have been reviewed. Encourage fluids and good nutrition. Stress fall prevention strategies. Anurag Rao, APRN-CNP      Anurag Rao, DNP, MSN, RN, GNP-BC, NP-C  Adult/Kishore Nurse Practitioner  [address]  Department of Geriatrics  8:30am-4:30pm   [phone]  After hours Answering service [phone]      Please note this report is partially produced by using speech recognition hardware. It may contain errors related to the system, including grammar, punctuation and spelling as well as words and phrases that may seem inaccurate.   For any questions or concerns feel free to contact me for clarification

## 2022-11-30 ENCOUNTER — OFFICE VISIT (OUTPATIENT)
Dept: GERIATRIC MEDICINE | Age: 87
End: 2022-11-30
Payer: MEDICARE

## 2022-11-30 DIAGNOSIS — R53.83 LETHARGY: ICD-10-CM

## 2022-11-30 DIAGNOSIS — J18.9 PNEUMONIA DUE TO INFECTIOUS ORGANISM, UNSPECIFIED LATERALITY, UNSPECIFIED PART OF LUNG: ICD-10-CM

## 2022-11-30 DIAGNOSIS — F02.818 LATE ONSET ALZHEIMER'S DISEASE WITH BEHAVIORAL DISTURBANCE (HCC): Primary | ICD-10-CM

## 2022-11-30 DIAGNOSIS — G30.1 LATE ONSET ALZHEIMER'S DISEASE WITH BEHAVIORAL DISTURBANCE (HCC): Primary | ICD-10-CM

## 2022-11-30 PROCEDURE — 1123F ACP DISCUSS/DSCN MKR DOCD: CPT | Performed by: NURSE PRACTITIONER

## 2022-12-04 NOTE — PROGRESS NOTES
[facility]  [address]      Assisted living apartment    11/30/2022    Juliane Malik  is a 80 y.o. in the NF being seen for a f/u of   Chief Complaint   Patient presents with    Pneumonia     Resolving        Spoke to nursing and ancillary staff regarding patient status, present state of health. Need for nursing care and assistance. Reviewed chart, labs, medications in NF chart and allergies. Aging in place at this time. HPI lives in  assisted living. Being seen for f/u of her PNA  Her lethargy has resolved she is at her baseline again. She is eating again. No cough  Reports from nurse, pt is at baseline, no change in vision, hearing. No headaches or choking issues. Uses  wheel chair for locomotion in facility. She is self propelling   No decrease in appetite, no change in B/B habits. No pain crises. NO fevers. Can make needs known to nurse. Past Medical History:   Diagnosis Date    Benign essential HTN 11/17/2019    Constipation 7/26/2016    Detrusor instability 11/17/2019    Dyslipidemia 11/17/2019    Heart valve disease 11/17/2019    Longstanding persistent atrial fibrillation ([facility] [address] 11/17/2019    Primary osteoarthritis involving multiple joints 11/17/2019     History reviewed. No pertinent surgical history. History reviewed. No pertinent family history. Social History     Socioeconomic History    Marital status:       Spouse name: Not on file    Number of children: Not on file    Years of education: Not on file    Highest education level: Not on file   Occupational History    Not on file   Tobacco Use    Smoking status: Never    Smokeless tobacco: Never   Substance and Sexual Activity    Alcohol use: Not on file    Drug use: Not on file    Sexual activity: Not on file   Other Topics Concern    Not on file   Social History Narrative    Not on file     Social Determinants of Health     Financial Resource Strain: Not on file Food Insecurity: Not on file   Transportation Needs: Not on file   Physical Activity: Inactive    Days of Exercise per Week: 0 days    Minutes of Exercise per Session: 0 min   Stress: Not on file   Social Connections: Not on file   Intimate Partner Violence: Not on file   Housing Stability: Not on file       Allergies: Pcn [penicillins] and Vesicare [solifenacin]  NF MEDICATIONS REVIEWED    ROS:  See HPI  Constitutional: There are no reports of behavioral issues,  appetite back to normal for her, no fever, or weakness. No gross bleeding issues. No difficulty ambulating to meals or activities. Respiratory: denies SOB, dyspnea,   Cardiovascular: denies CP, lightheadedness,   GI: No reports of change of bowel habits, no N/V/D/C. : no reports of change in bladder habits  Extremities: No reports of uncontrolled pain issues, no  chronic edema  Skin: denies wounds  Psych: no reports of unacceptable behaviors, no reports of depression    Physical exam:   VS  98%  59-18  118/70    Constitutional: Awake and alert sitting up in chair, neat appearance, good grooming  HEENT: normocephalic, PEAR, MMM, no cyanosis. NO neck mass visualized ,   Cardiovascular: Regular rate  Respiratory: LCTA, unlabored respirations  GI: abdomen ND  : no bladder tenderness, no urine incontinence at my exam   Extremities: no edema,  Psych: pleasant and able to follow simple commands, normal thought content, speech clear    ASSESSMENT:     Diagnosis Orders   1. Late onset Alzheimer's disease with behavioral disturbance ([facility] [address]        2. Lethargy        3. Pneumonia due to infectious organism, unspecified laterality, unspecified part of lung            PLAN:   No  progression of dementia     PNA resolved   Lethargy resolved     Return in about 3 months (around 2/28/2023). Pertinent POC, labs, have been reviewed, continue same. Encourage fluids and good nutrition. Stress fall prevention strategies.   Pt/POA agrees to Maximilian Rico, JESSENIA-YANET Pineda DNP, MSN, RN, GNP-BC, NP-C  Adult/Kishore Nurse Practitioner  [address]  Department of Geriatrics  8:30am-4:30pm   [phone]  After hours Answering service [phone]      Please note this report is partially produced by using speech recognition hardware. It may contain errors related to the system, including grammar, punctuation and spelling as well as words and phrases that may seem inaccurate.   For any questions or concerns feel free to contact me for clarification

## 2023-01-05 LAB
ALBUMIN SERPL-MCNC: 3.7 G/DL
ALP BLD-CCNC: 38 U/L
ALT SERPL-CCNC: 10 U/L
ANION GAP SERPL CALCULATED.3IONS-SCNC: NORMAL MMOL/L
AST SERPL-CCNC: 18 U/L
BILIRUB SERPL-MCNC: 0.7 MG/DL (ref 0.1–1.4)
BUN BLDV-MCNC: 28 MG/DL
CALCIUM SERPL-MCNC: 9.9 MG/DL
CHLORIDE BLD-SCNC: 101 MMOL/L
CO2: 29 MMOL/L
CREAT SERPL-MCNC: 0.8 MG/DL
GFR CALCULATED: NORMAL
GLUCOSE BLD-MCNC: 86 MG/DL
POTASSIUM SERPL-SCNC: 3.7 MMOL/L
SODIUM BLD-SCNC: 142 MMOL/L
TOTAL PROTEIN: 6.5

## 2023-01-13 ENCOUNTER — NURSE ONLY (OUTPATIENT)
Dept: GERIATRIC MEDICINE | Age: 88
End: 2023-01-13
Payer: MEDICARE

## 2023-01-13 DIAGNOSIS — F22 DELUSION (HCC): Primary | ICD-10-CM

## 2023-01-13 DIAGNOSIS — G30.1 MODERATE LATE ONSET ALZHEIMER'S DEMENTIA WITH PSYCHOTIC DISTURBANCE (HCC): ICD-10-CM

## 2023-01-13 DIAGNOSIS — I48.20 CHRONIC ATRIAL FIBRILLATION (HCC): ICD-10-CM

## 2023-01-13 DIAGNOSIS — F02.B2 MODERATE LATE ONSET ALZHEIMER'S DEMENTIA WITH PSYCHOTIC DISTURBANCE (HCC): ICD-10-CM

## 2023-01-13 PROCEDURE — 99349 HOME/RES VST EST MOD MDM 40: CPT | Performed by: NURSE PRACTITIONER

## 2023-01-13 PROCEDURE — 1123F ACP DISCUSS/DSCN MKR DOCD: CPT | Performed by: NURSE PRACTITIONER

## 2023-01-14 ENCOUNTER — HOSPITAL ENCOUNTER (EMERGENCY)
Age: 88
Discharge: HOME | End: 2023-01-14
Attending: EMERGENCY MEDICINE
Payer: MEDICARE

## 2023-01-14 ENCOUNTER — APPOINTMENT (OUTPATIENT)
Dept: GENERAL RADIOLOGY | Age: 88
End: 2023-01-14
Payer: MEDICARE

## 2023-01-14 ENCOUNTER — APPOINTMENT (OUTPATIENT)
Dept: CT IMAGING | Age: 88
End: 2023-01-14
Payer: MEDICARE

## 2023-01-14 VITALS
WEIGHT: 150 LBS | HEIGHT: 65 IN | TEMPERATURE: 98.2 F | SYSTOLIC BLOOD PRESSURE: 149 MMHG | DIASTOLIC BLOOD PRESSURE: 85 MMHG | OXYGEN SATURATION: 95 % | BODY MASS INDEX: 24.99 KG/M2 | HEART RATE: 66 BPM | RESPIRATION RATE: 18 BRPM

## 2023-01-14 DIAGNOSIS — S09.90XA CLOSED HEAD INJURY, INITIAL ENCOUNTER: ICD-10-CM
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
## 2023-01-14 LAB
ALBUMIN SERPL-MCNC: 3.8 G/DL (ref 3.5–4.6)
ALP BLD-CCNC: 48 U/L (ref 40–130)
ALT SERPL-CCNC: 14 U/L (ref 0–33)
ANION GAP SERPL CALCULATED.3IONS-SCNC: 12 MEQ/L (ref 9–15)
AST SERPL-CCNC: 29 U/L (ref 0–35)
BACTERIA: NEGATIVE /HPF
BASOPHILS ABSOLUTE: 0 K/UL (ref 0–0.2)
BASOPHILS RELATIVE PERCENT: 0.3 %
BILIRUB SERPL-MCNC: 0.5 MG/DL (ref 0.2–0.7)
BILIRUBIN URINE: NEGATIVE
BLOOD, URINE: NEGATIVE
BUN BLDV-MCNC: 22 MG/DL (ref 8–23)
CALCIUM SERPL-MCNC: 10 MG/DL (ref 8.5–9.9)
CHLORIDE BLD-SCNC: 98 MEQ/L (ref 95–107)
CLARITY: CLEAR
CO2: 32 MEQ/L (ref 20–31)
COLOR: YELLOW
CREAT SERPL-MCNC: 0.9 MG/DL (ref 0.5–0.9)
EKG ATRIAL RATE: 70 BPM
EKG Q-T INTERVAL: 424 MS
EKG QRS DURATION: 96 MS
EKG QTC CALCULATION (BAZETT): 416 MS
EKG R AXIS: 46 DEGREES
EKG T AXIS: -63 DEGREES
EKG VENTRICULAR RATE: 58 BPM
EOSINOPHILS ABSOLUTE: 0 K/UL (ref 0–0.7)
EOSINOPHILS RELATIVE PERCENT: 0.2 %
EPITHELIAL CELLS, UA: ABNORMAL /HPF (ref 0–5)
GFR SERPL CREATININE-BSD FRML MDRD: 58.2 ML/MIN/{1.73_M2}
GLOBULIN: 2.9 G/DL (ref 2.3–3.5)
GLUCOSE BLD-MCNC: 110 MG/DL (ref 70–99)
GLUCOSE URINE: NEGATIVE MG/DL
HCT VFR BLD CALC: 42 % (ref 37–47)
HEMOGLOBIN: 14 G/DL (ref 12–16)
HYALINE CASTS: ABNORMAL /HPF (ref 0–5)
KETONES, URINE: ABNORMAL MG/DL
LACTIC ACID: 1.8 MMOL/L (ref 0.5–2.2)
LEUKOCYTE ESTERASE, URINE: ABNORMAL
LIPASE: 25 U/L (ref 12–95)
LYMPHOCYTES ABSOLUTE: 1.2 K/UL (ref 1–4.8)
LYMPHOCYTES RELATIVE PERCENT: 11.2 %
MAGNESIUM: 1.9 MG/DL (ref 1.7–2.4)
MCH RBC QN AUTO: 31.7 PG (ref 27–31.3)
MCHC RBC AUTO-ENTMCNC: 33.3 % (ref 33–37)
MCV RBC AUTO: 95.2 FL (ref 79.4–94.8)
MONOCYTES ABSOLUTE: 0.6 K/UL (ref 0.2–0.8)
MONOCYTES RELATIVE PERCENT: 5.9 %
NEUTROPHILS ABSOLUTE: 8.5 K/UL (ref 1.4–6.5)
NEUTROPHILS RELATIVE PERCENT: 82.4 %
NITRITE, URINE: NEGATIVE
PDW BLD-RTO: 14.8 % (ref 11.5–14.5)
PH UA: 6.5 (ref 5–9)
PLATELET # BLD: 229 K/UL (ref 130–400)
POTASSIUM SERPL-SCNC: 3.6 MEQ/L (ref 3.4–4.9)
PROTEIN UA: NEGATIVE MG/DL
RBC # BLD: 4.42 M/UL (ref 4.2–5.4)
RBC UA: ABNORMAL /HPF (ref 0–5)
SARS-COV-2, NAAT: NOT DETECTED
SODIUM BLD-SCNC: 142 MEQ/L (ref 135–144)
SPECIFIC GRAVITY UA: 1.02 (ref 1–1.03)
TOTAL PROTEIN: 6.7 G/DL (ref 6.3–8)
TROPONIN: 0.01 NG/ML (ref 0–0.01)
UROBILINOGEN, URINE: 1 E.U./DL
WBC # BLD: 10.3 K/UL (ref 4.8–10.8)
WBC UA: ABNORMAL /HPF (ref 0–5)

## 2023-01-14 PROCEDURE — 85025 COMPLETE CBC W/AUTO DIFF WBC: CPT

## 2023-01-14 PROCEDURE — 80053 COMPREHEN METABOLIC PANEL: CPT

## 2023-01-14 PROCEDURE — 70486 CT MAXILLOFACIAL W/O DYE: CPT

## 2023-01-14 PROCEDURE — 99285 EMERGENCY DEPT VISIT HI MDM: CPT | Performed by: EMERGENCY MEDICINE

## 2023-01-14 PROCEDURE — 83735 ASSAY OF MAGNESIUM: CPT

## 2023-01-14 PROCEDURE — 72125 CT NECK SPINE W/O DYE: CPT

## 2023-01-14 PROCEDURE — 93005 ELECTROCARDIOGRAM TRACING: CPT | Performed by: EMERGENCY MEDICINE

## 2023-01-14 PROCEDURE — 87635 SARS-COV-2 COVID-19 AMP PRB: CPT

## 2023-01-14 PROCEDURE — 84484 ASSAY OF TROPONIN QUANT: CPT

## 2023-01-14 PROCEDURE — 83605 ASSAY OF LACTIC ACID: CPT

## 2023-01-14 PROCEDURE — 83690 ASSAY OF LIPASE: CPT

## 2023-01-14 PROCEDURE — 2580000003 HC RX 258: Performed by: EMERGENCY MEDICINE

## 2023-01-14 PROCEDURE — 71045 X-RAY EXAM CHEST 1 VIEW: CPT

## 2023-01-14 PROCEDURE — 6830039000 HC L3 TRAUMA ALERT

## 2023-01-14 PROCEDURE — 81001 URINALYSIS AUTO W/SCOPE: CPT

## 2023-01-14 PROCEDURE — 36415 COLL VENOUS BLD VENIPUNCTURE: CPT

## 2023-01-14 PROCEDURE — 70450 CT HEAD/BRAIN W/O DYE: CPT

## 2023-01-14 RX ORDER — 0.9 % SODIUM CHLORIDE 0.9 %
1000 INTRAVENOUS SOLUTION INTRAVENOUS ONCE
Status: COMPLETED | OUTPATIENT
Start: 2023-01-14 | End: 2023-01-14

## 2023-01-14 RX ADMIN — SODIUM CHLORIDE 1000 ML: 9 INJECTION, SOLUTION INTRAVENOUS at 15:47

## 2023-01-14 NOTE — ED PROVIDER NOTES
[address] [facility]  eMERGENCYdEPARTMENT eNCOUnter      Pt Name: Hermilo Ortiz  MRN: [id number]  Yashiragfkrystal 4/2/1926  Date of evaluation: 1/14/2023  Edith Pruitt MD    CHIEF COMPLAINT           HPI  Vonnie Malik is a 80 y.o. female per chart review has a h/o HTN, hpl, afib, OA presents to the ED s/p fall. Per NH, pt fell 2 hours prior to arrival and hit her face. Pt is baseline axox1. Pt is axox1 upon arrival.  Pt denies pain. ROS  Review of Systems   Unable to perform ROS: Dementia     Except as noted above the remainder of the review of systems was reviewed and negative. PAST MEDICAL HISTORY     Past Medical History:   Diagnosis Date    Benign essential HTN 11/17/2019    Constipation 7/26/2016    Detrusor instability 11/17/2019    Dyslipidemia 11/17/2019    Heart valve disease 11/17/2019    Longstanding persistent atrial fibrillation ([address] 11/17/2019    Primary osteoarthritis involving multiple joints 11/17/2019         SURGICAL HISTORY     History reviewed. No pertinent surgical history.       CURRENTMEDICATIONS       Previous Medications    ACETAMINOPHEN (TYLENOL) 500 MG TABLET    Take 500 mg by mouth in the morning, at noon, and at bedtime Indications: Pain     ASPIRIN 325 MG TABLET    Take 325 mg by mouth daily Indications: Preventative Treatment     BACITRACIN-POLYMYXIN B, OPHTH, OINT    Place 1 each into the right eye nightly 1/4\" strip    CALCIUM CARBONATE 600 MG TABS TABLET    Take 2 tablets by mouth daily Indications: Nutritional Support    DICLOFENAC SODIUM (VOLTAREN) 1 % GEL    Apply 2 g topically 4 times daily To affected shoulders    DILTIAZEM (DILACOR XR) 240 MG EXTENDED RELEASE CAPSULE    Take 1 capsule by mouth every other day Indications: High Blood Pressure Disorder    DOCUSATE SODIUM (COLACE) 100 MG CAPSULE    Take 100 mg by mouth 2 times daily Indications: Constipation     FUROSEMIDE (LASIX) 20 MG TABLET    Take 1 tablet by mouth every other day On M, W, F GUAIFENESIN (ROBITUSSIN) 100 MG/5ML SYRUP    Take 200 mg by mouth 4 times daily as needed for Cough    HYDROCHLOROTHIAZIDE (HYDRODIURIL) 25 MG TABLET    Take 1 tablet by mouth daily    MECLIZINE (ANTIVERT) 25 MG TABLET    Take 0.5 tablets by mouth 3 times daily as needed    NYSTATIN (MYCOSTATIN) 461334 UNIT/GM CREAM        NYSTATIN (MYCOSTATIN) 803345 UNIT/GM POWDER    Apply topically daily as needed Indications: Fungal Dermatitis Apply to right Axilla    POLYETHYL GLYCOL-PROPYL GLYCOL 0.4-0.3 % (SYSTANE) 0.4-0.3 % OPHTHALMIC SOLUTION    Place 1 drop into both eyes 2 times daily Indications: Dry Eyes    PROPRANOLOL (INDERAL) 20 MG TABLET    Take 20 mg by mouth nightly Indications: High Blood Pressure Disorder, Involuntary Quivering     PSYLLIUM PO    Take 15 g by mouth daily as needed    QUETIAPINE (SEROQUEL) 25 MG TABLET    Take 1 tablet by mouth at bedtime Indications: Behavioral Disorders associated with Dementia    SODIUM CHLORIDE (CHELO 128) 5 % OPHTHALMIC SOLUTION    1 drop every 4 hours    SODIUM CHLORIDE 5 % OPHTHALMIC OINTMENT    Place 1 drop into the right eye daily as needed Indications: Dry Eyes Lower eye lid    TRAMADOL (ULTRAM) 50 MG TABLET    Take 1 tablet by mouth every 12 hours as needed for Pain for up to 30 days. ALLERGIES     Pcn [penicillins] and Vesicare [solifenacin]    FAMILY HISTORY     History reviewed. No pertinent family history. SOCIAL HISTORY       Social History     Socioeconomic History    Marital status:       Spouse name: None    Number of children: None    Years of education: None    Highest education level: None   Tobacco Use    Smoking status: Never    Smokeless tobacco: Never     Social Determinants of Health     Physical Activity: Inactive    Days of Exercise per Week: 0 days    Minutes of Exercise per Session: 0 min         PHYSICAL EXAM       ED Triage Vitals   BP Temp Temp src Pulse Resp SpO2 Height Weight   -- -- -- -- -- -- -- --       Physical Exam  Vitals and nursing note reviewed. Constitutional:       Appearance: She is well-developed. HENT:      Head: Normocephalic. Right Ear: External ear normal.      Left Ear: External ear normal.      Nose:      Comments: +Bruising, swelling noted over nose  Eyes:      Conjunctiva/sclera: Conjunctivae normal.      Pupils: Pupils are equal, round, and reactive to light. Cardiovascular:      Rate and Rhythm: Normal rate and regular rhythm. Heart sounds: Normal heart sounds. Pulmonary:      Effort: Pulmonary effort is normal.      Breath sounds: Normal breath sounds. Abdominal:      General: Bowel sounds are normal. There is no distension. Palpations: Abdomen is soft. Tenderness: There is no abdominal tenderness. Musculoskeletal:         General: Normal range of motion. Cervical back: Normal range of motion and neck supple. Skin:     General: Skin is warm and dry. Neurological:      Mental Status: She is alert and oriented to person, place, and time. Psychiatric:         Mood and Affect: Mood normal.         MDM  79 yo female presents to the ED s/p fall. Pt is afebrile, hemodynamically stable. Pt denies any pain. EKG shows afib with HR 56, normal axis, normal intervals, no ST changes. CT head, cspine, facial bones negative. Pt in the ED with sonorous respirations. Family states that sometimes the pt talks and this was not her baseline. Labs done. Pt given 1 L NS in the ED. Labs remarkable for troponin 0.013. UA negative. Spoke with NH and stated that this is closed to patient's baseline. Pt intermittently moves her legs thus low suspicion for brain stem CVA. Suspect possible polypharmacy. Pt takes seroquel and tramadol. Family states she feels comfortable having pt go back to NH in this state. Pt discharged back to NH. FINAL IMPRESSION      1. Fall, initial encounter    2.  Closed head injury, initial encounter          DISPOSITION/PLAN   DISPOSITION Decision To Discharge 01/14/2023 04:49:47 PM        DISCHARGE MEDICATIONS:  [unfilled]         Ernesto Santana MD(electronically signed)  Attending Emergency Physician            Ernesto Santana MD  01/14/23 [id number]

## 2023-01-16 LAB
EKG ATRIAL RATE: 357 BPM
EKG Q-T INTERVAL: 468 MS
EKG QRS DURATION: 104 MS
EKG QTC CALCULATION (BAZETT): 451 MS
EKG R AXIS: 51 DEGREES
EKG T AXIS: -78 DEGREES
EKG VENTRICULAR RATE: 56 BPM

## 2023-01-17 NOTE — PROGRESS NOTES
[facility]  [address] [place] [address]      Assisted living apartment    1/13/2023    Jane Malik  is a 89 y.o. in the NF being seen for a f/u of   Chief Complaint   Patient presents with    Other     More delusional  Had antipsychotics reduced last month       Spoke to nursing and ancillary staff regarding patient status, present state of health. Need for nursing care and assistance. Reviewed chart, labs, medications in NF chart and allergies. Aging in place at this time.     HPI lives in  assisted living.   Being seen for chronic illnesses. Reports from nurse, pt is not at baseline, has more hallucinations and delusions, no change in vision, hearing. No headaches or choking issues. Uses walker  for locomotion in facility. No decrease in appetite, no change in B/B habits. No pain crises. NO fevers. Can make needs known to nurse.     Past Medical History:   Diagnosis Date    Benign essential HTN 11/17/2019    Constipation 7/26/2016    Detrusor instability 11/17/2019    Dyslipidemia 11/17/2019    Heart valve disease 11/17/2019    Longstanding persistent atrial fibrillation (HCC) 11/17/2019    Primary osteoarthritis involving multiple joints 11/17/2019     No past surgical history on file.  No family history on file.  Social History     Socioeconomic History    Marital status:      Spouse name: Not on file    Number of children: Not on file    Years of education: Not on file    Highest education level: Not on file   Occupational History    Not on file   Tobacco Use    Smoking status: Never    Smokeless tobacco: Never   Substance and Sexual Activity    Alcohol use: Not on file    Drug use: Not on file    Sexual activity: Not on file   Other Topics Concern    Not on file   Social History Narrative    Not on file     Social Determinants of Health     Financial Resource Strain: Not on file   Food Insecurity: Not on file   Transportation Needs: Not on file  Physical Activity: Inactive    Days of Exercise per Week: 0 days    Minutes of Exercise per Session: 0 min   Stress: Not on file   Social Connections: Not on file   Intimate Partner Violence: Not on file   Housing Stability: Not on file       Allergies: Pcn [penicillins] and Vesicare [solifenacin]  NF MEDICATIONS REVIEWED    ROS:  See HPI  Constitutional: There are no reports of  change in appetite, fever, or weakness. No gross bleeding issues. No difficulty ambulating to meals or activities. Respiratory: denies SOB, dyspnea,   Cardiovascular: denies CP, lightheadedness,   GI: No reports of change of bowel habits, no N/V/D/C. : no reports of change in bladder habits  Extremities: No reports of uncontrolled pain issues, no gross chronic edema  Skin: denies wounds  Psych: no reports of unacceptable behaviors, no reports of depression  Over this last week having ore hallucinations/delusions and confusion    Physical exam:   148/76  140.6lb  97.7-72-16  Constitutional: Awake and alert sitting up in chair, neat appearance, good grooming  HEENT: normocephalic, PEAR, MMM, no cyanosis. NO neck mass visualized , facial asymmetry at baseline. Cardiovascular: Regular rate  Respiratory: LCTA, unlabored respirations  GI: abdomen ND  : no bladder tenderness, no urine incontinence at my exam   Extremities: no edema,  Psych: pleasant and able to follow simple commands, normal thought content, speech clear but sparse no hallucinations at exam.     ASSESSMENT:     Diagnosis Orders   1. Delusion ([facility] [address]        2. Chronic atrial fibrillation (HCC)        3.  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 Possible progression of YOLI which could increase hallucinations vs dose reduction of medications last month     Return in about 2 months (around 3/13/2023). Pertinent POC, labs, have been reviewed, continue same. Encourage fluids and good nutrition. Stress fall prevention strategies. Pt/POA agrees to Maximilian 63, APRN-CNP      Osman Gonzales, DNP, MSN, RN, GNP-BC, NP-C  Adult/Kishore Nurse Practitioner  [address]  Department of Geriatrics  8:30am-4:30pm   [phone]  After hours Answering service [phone]      Please note this report is partially produced by using speech recognition hardware. It may contain errors related to the system, including grammar, punctuation and spelling as well as words and phrases that may seem inaccurate.   For any questions or concerns feel free to contact me for clarification

## 2023-01-18 ENCOUNTER — TELEPHONE (OUTPATIENT)
Dept: PALLATIVE CARE | Age: 88
End: 2023-01-18

## 2023-01-18 NOTE — TELEPHONE ENCOUNTER
Pt nurse Lady Summers calling to report that the patient is declining. She states that the patient fell last week, extremely confused, having outburst screaming and yelling throughout the day and night. The facility is requesting you can see her sooner, her original appointment is 2/14.    Also you can give Nicholas Merchant a call at [phone]

## 2023-01-20 ENCOUNTER — NURSE ONLY (OUTPATIENT)
Dept: GERIATRIC MEDICINE | Age: 88
End: 2023-01-20

## 2023-01-20 DIAGNOSIS — R44.3 HALLUCINATION: Primary | ICD-10-CM

## 2023-01-20 DIAGNOSIS — S09.90XD CLOSED HEAD INJURY, SUBSEQUENT ENCOUNTER: ICD-10-CM

## 2023-01-20 DIAGNOSIS — F03.918 BEHAVIORAL AND PSYCHOLOGICAL SYMPTOMS OF DEMENTIA: ICD-10-CM
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
## 2023-01-20 DIAGNOSIS — I35.0 CRITICAL AORTIC VALVE STENOSIS: ICD-10-CM

## 2023-01-20 DIAGNOSIS — I95.2 HYPOTENSION DUE TO DRUGS: ICD-10-CM

## 2023-01-20 RX ORDER — DILTIAZEM HYDROCHLORIDE 60 MG/1
60 TABLET, FILM COATED ORAL 2 TIMES DAILY
Qty: 60 TABLET | Refills: 1 | Status: SHIPPED | OUTPATIENT
Start: 2023-01-20 | End: 2023-03-21

## 2023-01-20 NOTE — PROGRESS NOTES
[facility]  [address] [facility], [address] [place] [address]      Assisted living apartment    1/20/2023    Marty Malik  is a 80 y.o. in the NF being seen for a f/u of   Chief Complaint   Patient presents with    Other     F/u after ED visit for fall w/u       Spoke to nursing and ancillary staff regarding patient status, present state of health. Need for nursing care and assistance. Reviewed chart, labs, medications in NF chart and allergies. Decline at this time. HPI lives in  assisted living. Being seen for c/o ED visit for fall 1/14  Here at facility Is a freq antoine, had significant more hallucinations recently. Empiric Cipro for suspected UTI completed. Jackson Harper on face sent to ED. W/u in ED negative - see below     U/a neg  Lab Results   Component Value Date    WBC 10.3 01/14/2023    HGB 14.0 01/14/2023    HCT 42.0 01/14/2023    MCV 95.2 (H) 01/14/2023     01/14/2023   Iipase 25   Lab Results   Component Value Date/Time     01/14/2023 03:15 PM    K 3.6 01/14/2023 03:15 PM    CL 98 01/14/2023 03:15 PM    CO2 32 01/14/2023 03:15 PM    BUN 22 01/14/2023 03:15 PM    CREATININE 0.90 01/14/2023 03:15 PM    GLUCOSE 110 01/14/2023 03:15 PM    CALCIUM 10.0 01/14/2023 03:15 PM    Mg 1.9  Lab Results   Component Value Date    CKTOTAL 26 01/22/2013    CKMB 0.8 01/21/2013    TROPONINI 0.013 ([facility]) 01/14/2023       CXR 1/14 NAD    Ct head 1/14  1. No acute intracranial abnormality. 2.  Moderate, diffuse, cerebral atrophy again noted, when compared to the   previous study performed did 08/18/2020. 3.  Probable old lacunar infarct involving the left corona radiata. 4.  Microvascular white matter ischemic changes again seen. 5.  Minimal chronic ethmoid sinusitis. Ct cervical spine   1/14  1. No fracture.        2.  Mild retrolisthesis at C4 over C5 and C5 over C6.       3.  Mild anterolisthesis at C7 over T1.       4.  Significant osteo-degenerative changes and discogenic disc disease, as   described above. 5.  Small posterior central disc protrusion at C2-3.       6.  Small posterior ridge-disc complexes at C5-6 and C6-7.       7.  Mild spinal canal stenosis at C5-6. Ct facial bones 1/14    1. No acute facial bone trauma. 2.  Minimal chronic ethmoid sinusitis. 3.  Bilateral TMJ and cervical spine osteo-degenerative changes, as described   above. 4.  Other findings, as described above. Reports from nurse, pt lethargic, request hospital bed. Sleeping in chair, not eating. No headaches or choking issues. Uses walker or  wheel chair for locomotion in facility. Recent decrease in appetite, no change in B/B habits. No pain crises. NO fevers. Can make needs known to nurse. But speech is more sparse. Past Medical History:   Diagnosis Date    Benign essential HTN 11/17/2019    Constipation 7/26/2016    Detrusor instability 11/17/2019    Dyslipidemia 11/17/2019    Heart valve disease 11/17/2019    Longstanding persistent atrial fibrillation ([facility] [address] 11/17/2019    Primary osteoarthritis involving multiple joints 11/17/2019     History reviewed. No pertinent surgical history. History reviewed. No pertinent family history. Social History     Socioeconomic History    Marital status:       Spouse name: Not on file    Number of children: Not on file    Years of education: Not on file    Highest education level: Not on file   Occupational History    Not on file   Tobacco Use    Smoking status: Never    Smokeless tobacco: Never   Substance and Sexual Activity    Alcohol use: Not on file    Drug use: Not on file    Sexual activity: Not on file   Other Topics Concern    Not on file   Social History Narrative    Not on file     Social Determinants of Health     Financial Resource Strain: Not on file   Food Insecurity: Not on file   Transportation Needs: Not on file   Physical Activity: Inactive    Days of Exercise per Week: 0 days    Minutes of Exercise per Session: 0 min   Stress: Not on file   Social Connections: Not on file   Intimate Partner Violence: Not on file   Housing Stability: Not on file       Allergies: Pcn [penicillins] and Vesicare [solifenacin]  NF MEDICATIONS REVIEWED    ROS:  See HPI  Constitutional: There are  reports of behavioral issues hallucinations, recent change in appetite due to lethargy, no fever, increase in  weakness. No gross bleeding issues. More difficulty ambulating to meals or activities. Respiratory: no reports SOB, dyspnea,   Cardiovascular: no reports  CP, lightheadedness,   GI: No reports of change of bowel habits, no N/V/D/C. : no reports of change in bladder habits  Extremities: No reports of uncontrolled pain issues ,   chronic edema legs much improved lately but not drinking well   Skin: denies wounds  Psych: no reports of unacceptable behaviors, no reports of depression    Physical exam:   100/60  68-16  98% RA  Constitutional: sleepy, awakes to tactile,  slumped  in chair, neat appearance, good grooming  HEENT: normocephalic,  no cyanosis. NO neck mass visualized , facial asymmetry , at baseline. Bruises noted around eyes . Membranes are dry. Cardiovascular: Regular rate holosystolic NARCISA  Respiratory: LCTA, unlabored respirations  GI: abdomen ND  NT  : no bladder tenderness, no urine incontinence at my exam   Extremities: no edema,  Psych: flat. Speech sparse. ASSESSMENT:     Diagnosis Orders   1. Hallucination        2. Fall, subsequent encounter        3. Closed head injury, subsequent encounter        4. Hypotension due to drugs        5. Critical aortic valve stenosis        6. Behavioral and psychological symptoms of dementia            PLAN:   Protect airway, keep upright when awake enough to eat or drink, evaluate for aspiration. Do not feed if lethargic. Fall w/u negative for trauma or acute injury. No fractures, no bleeds.      Low BP, Reduce calcium channel blocker used for AFib to keep rate under control, suspect poor cardiac ouput with severe aortic stenosis. Stop propanolol no tremors and may be contributing to hypotension. Stop HTCZ    Change Seroquel to hs from 12.5mg bid to 25mg qhs    Critical aortic stenosis with need for hospice   AFib no blood thinners due to falls, continue hi dose ASA     Spoke with niece and telephone message with update    Has not been on ultram and makes no pain c/o. Will assess as needed due to recent fall. Orders Placed This Encounter   Medications    dilTIAZem (CARDIZEM) 60 MG tablet     Sig: Take 1 tablet by mouth in the morning and at bedtime Hold for SBP < 130     Dispense:  60 tablet     Refill:  1       Return if symptoms worsen or fail to improve. Pertinent POC, labs, have been reviewed, continue same. Encourage fluids and good nutrition. Stress fall prevention strategies. Pt/POA agrees to Maximilian 63, APRN-CNP      Burgess Acevedo, DNP, MSN, RN, GNP-BC, NP-C  Adult/Kishoer Nurse Practitioner  [address]  Department of Geriatrics  8:30am-4:30pm   [phone]  After hours Answering service [phone]      Please note this report is partially produced by using speech recognition hardware. It may contain errors related to the system, including grammar, punctuation and spelling as well as words and phrases that may seem inaccurate.   For any questions or concerns feel free to contact me for clarification

## 2025-03-24 NOTE — PATIENT INSTRUCTIONS
Personalized Preventive Plan for Denton Malik - 3/3/2021  Medicare offers a range of preventive health benefits. Some of the tests and screenings are paid in full while other may be subject to a deductible, co-insurance, and/or copay. Some of these benefits include a comprehensive review of your medical history including lifestyle, illnesses that may run in your family, and various assessments and screenings as appropriate. After reviewing your medical record and screening and assessments performed today your provider may have ordered immunizations, labs, imaging, and/or referrals for you. A list of these orders (if applicable) as well as your Preventive Care list are included within your After Visit Summary for your review. Other Preventive Recommendations:    · A preventive eye exam performed by an eye specialist is recommended every 1-2 years to screen for glaucoma; cataracts, macular degeneration, and other eye disorders. · A preventive dental visit is recommended every 6 months. · Try to get at least 150 minutes of exercise per week or 10,000 steps per day on a pedometer . · Order or download the FREE \"Exercise & Physical Activity: Your Everyday Guide\" from [employer]. Call 2-586.132.6968 or search [employer] online. · You need 2618-5809 mg of calcium and 9323-1705 IU of vitamin D per day. It is possible to meet your calcium requirement with diet alone, but a vitamin D supplement is usually necessary to meet this goal.  · When exposed to the sun, use a sunscreen that protects against both UVA and UVB radiation with an SPF of 30 or greater. Reapply every 2 to 3 hours or after sweating, drying off with a towel, or swimming. · Always wear a seat belt when traveling in a car. Always wear a helmet when riding a bicycle or motorcycle.
yes
